# Patient Record
Sex: FEMALE | Race: WHITE | NOT HISPANIC OR LATINO | Employment: FULL TIME | URBAN - METROPOLITAN AREA
[De-identification: names, ages, dates, MRNs, and addresses within clinical notes are randomized per-mention and may not be internally consistent; named-entity substitution may affect disease eponyms.]

---

## 2019-02-20 ENCOUNTER — APPOINTMENT (OUTPATIENT)
Dept: WOMENS IMAGING | Facility: HOSPITAL | Age: 49
End: 2019-02-20

## 2019-02-20 PROCEDURE — 77067 SCR MAMMO BI INCL CAD: CPT | Performed by: RADIOLOGY

## 2019-02-20 PROCEDURE — 77063 BREAST TOMOSYNTHESIS BI: CPT | Performed by: RADIOLOGY

## 2019-11-08 VITALS
HEART RATE: 77 BPM | RESPIRATION RATE: 40 BRPM | HEART RATE: 91 BPM | SYSTOLIC BLOOD PRESSURE: 146 MMHG | RESPIRATION RATE: 27 BRPM | DIASTOLIC BLOOD PRESSURE: 84 MMHG | SYSTOLIC BLOOD PRESSURE: 132 MMHG | HEART RATE: 93 BPM | DIASTOLIC BLOOD PRESSURE: 91 MMHG | RESPIRATION RATE: 14 BRPM | DIASTOLIC BLOOD PRESSURE: 89 MMHG | TEMPERATURE: 99 F | DIASTOLIC BLOOD PRESSURE: 70 MMHG | OXYGEN SATURATION: 97 % | DIASTOLIC BLOOD PRESSURE: 95 MMHG | SYSTOLIC BLOOD PRESSURE: 119 MMHG | RESPIRATION RATE: 20 BRPM | SYSTOLIC BLOOD PRESSURE: 163 MMHG | HEART RATE: 84 BPM | HEART RATE: 89 BPM | SYSTOLIC BLOOD PRESSURE: 124 MMHG | DIASTOLIC BLOOD PRESSURE: 66 MMHG | HEART RATE: 87 BPM | DIASTOLIC BLOOD PRESSURE: 81 MMHG | SYSTOLIC BLOOD PRESSURE: 158 MMHG | OXYGEN SATURATION: 98 % | TEMPERATURE: 97.1 F | SYSTOLIC BLOOD PRESSURE: 151 MMHG | HEART RATE: 80 BPM | RESPIRATION RATE: 39 BRPM | OXYGEN SATURATION: 96 % | OXYGEN SATURATION: 99 % | WEIGHT: 189 LBS | HEIGHT: 64 IN | OXYGEN SATURATION: 100 % | RESPIRATION RATE: 18 BRPM | SYSTOLIC BLOOD PRESSURE: 115 MMHG

## 2019-11-11 ENCOUNTER — AMBULATORY SURGICAL CENTER (OUTPATIENT)
Dept: URBAN - METROPOLITAN AREA SURGERY 17 | Facility: SURGERY | Age: 49
End: 2019-11-11
Payer: COMMERCIAL

## 2019-11-11 DIAGNOSIS — K64.1 SECOND DEGREE HEMORRHOIDS: ICD-10-CM

## 2019-11-11 DIAGNOSIS — Z12.11 ENCOUNTER FOR SCREENING FOR MALIGNANT NEOPLASM OF COLON: ICD-10-CM

## 2019-11-11 DIAGNOSIS — Z80.0 FAMILY HISTORY OF MALIGNANT NEOPLASM OF DIGESTIVE ORGANS: ICD-10-CM

## 2019-11-11 PROCEDURE — 45378 DIAGNOSTIC COLONOSCOPY: CPT | Mod: 33 | Performed by: INTERNAL MEDICINE

## 2021-02-17 ENCOUNTER — APPOINTMENT (OUTPATIENT)
Dept: WOMENS IMAGING | Facility: HOSPITAL | Age: 51
End: 2021-02-17

## 2021-02-17 PROCEDURE — 77067 SCR MAMMO BI INCL CAD: CPT | Performed by: RADIOLOGY

## 2021-02-17 PROCEDURE — 77063 BREAST TOMOSYNTHESIS BI: CPT | Performed by: RADIOLOGY

## 2022-03-24 ENCOUNTER — APPOINTMENT (OUTPATIENT)
Dept: WOMENS IMAGING | Facility: HOSPITAL | Age: 52
End: 2022-03-24

## 2022-03-24 PROCEDURE — 77063 BREAST TOMOSYNTHESIS BI: CPT | Performed by: RADIOLOGY

## 2022-03-24 PROCEDURE — 77067 SCR MAMMO BI INCL CAD: CPT | Performed by: RADIOLOGY

## 2022-11-07 DIAGNOSIS — D50.9 IRON DEFICIENCY ANEMIA, UNSPECIFIED IRON DEFICIENCY ANEMIA TYPE: Primary | ICD-10-CM

## 2022-11-14 ENCOUNTER — CONSULT (OUTPATIENT)
Dept: ONCOLOGY | Facility: CLINIC | Age: 52
End: 2022-11-14

## 2022-11-14 ENCOUNTER — LAB (OUTPATIENT)
Dept: LAB | Facility: HOSPITAL | Age: 52
End: 2022-11-14

## 2022-11-14 VITALS
SYSTOLIC BLOOD PRESSURE: 156 MMHG | TEMPERATURE: 97.5 F | DIASTOLIC BLOOD PRESSURE: 98 MMHG | HEIGHT: 66 IN | HEART RATE: 68 BPM | WEIGHT: 191.6 LBS | OXYGEN SATURATION: 97 % | RESPIRATION RATE: 18 BRPM | BODY MASS INDEX: 30.79 KG/M2

## 2022-11-14 DIAGNOSIS — E06.3 HASHIMOTO'S THYROIDITIS: ICD-10-CM

## 2022-11-14 DIAGNOSIS — M35.03 SJOGREN'S SYNDROME WITH MYOPATHY: ICD-10-CM

## 2022-11-14 DIAGNOSIS — D64.9 ANEMIA, UNSPECIFIED TYPE: ICD-10-CM

## 2022-11-14 DIAGNOSIS — D72.825 BANDEMIA: ICD-10-CM

## 2022-11-14 DIAGNOSIS — M79.10 MUSCLE PAIN: ICD-10-CM

## 2022-11-14 DIAGNOSIS — D50.9 IRON DEFICIENCY ANEMIA, UNSPECIFIED IRON DEFICIENCY ANEMIA TYPE: ICD-10-CM

## 2022-11-14 DIAGNOSIS — D75.839 THROMBOCYTOSIS: ICD-10-CM

## 2022-11-14 DIAGNOSIS — E04.9 GOITER: ICD-10-CM

## 2022-11-14 DIAGNOSIS — E04.9 GOITER, NODULAR: ICD-10-CM

## 2022-11-14 DIAGNOSIS — D75.839 THROMBOCYTOSIS: Primary | ICD-10-CM

## 2022-11-14 LAB
ALBUMIN SERPL-MCNC: 4.5 G/DL (ref 3.5–5.2)
ALBUMIN/GLOB SERPL: 1 G/DL (ref 1.1–2.4)
ALP SERPL-CCNC: 83 U/L (ref 38–116)
ALT SERPL W P-5'-P-CCNC: 13 U/L (ref 0–33)
ANION GAP SERPL CALCULATED.3IONS-SCNC: 12.8 MMOL/L (ref 5–15)
AST SERPL-CCNC: 26 U/L (ref 0–32)
BASOPHILS # BLD AUTO: 0.07 10*3/MM3 (ref 0–0.2)
BASOPHILS NFR BLD AUTO: 0.7 % (ref 0–1.5)
BILIRUB SERPL-MCNC: 0.2 MG/DL (ref 0.2–1.2)
BUN SERPL-MCNC: 11 MG/DL (ref 6–20)
BUN/CREAT SERPL: 13.8 (ref 7.3–30)
CALCIUM SPEC-SCNC: 9.9 MG/DL (ref 8.5–10.2)
CHLORIDE SERPL-SCNC: 101 MMOL/L (ref 98–107)
CK SERPL-CCNC: 100 U/L (ref 20–180)
CO2 SERPL-SCNC: 23.2 MMOL/L (ref 22–29)
CREAT SERPL-MCNC: 0.8 MG/DL (ref 0.6–1.1)
DEPRECATED RDW RBC AUTO: 41.6 FL (ref 37–54)
EGFRCR SERPLBLD CKD-EPI 2021: 89.9 ML/MIN/1.73
EOSINOPHIL # BLD AUTO: 0.12 10*3/MM3 (ref 0–0.4)
EOSINOPHIL NFR BLD AUTO: 1.1 % (ref 0.3–6.2)
ERYTHROCYTE [DISTWIDTH] IN BLOOD BY AUTOMATED COUNT: 13.3 % (ref 12.3–15.4)
FERRITIN SERPL-MCNC: 53.8 NG/ML (ref 11–207)
FOLATE SERPL-MCNC: 12.7 NG/ML (ref 4.78–24.2)
GLOBULIN UR ELPH-MCNC: 4.3 GM/DL (ref 1.8–3.5)
GLUCOSE SERPL-MCNC: 93 MG/DL (ref 74–124)
HCT VFR BLD AUTO: 39 % (ref 34–46.6)
HGB BLD-MCNC: 13.2 G/DL (ref 12–15.9)
HGB RETIC QN AUTO: 33.5 PG (ref 29.8–36.1)
IMM GRANULOCYTES # BLD AUTO: 0.1 10*3/MM3 (ref 0–0.05)
IMM GRANULOCYTES NFR BLD AUTO: 0.9 % (ref 0–0.5)
IMM RETICS NFR: 12.6 % (ref 3–15.8)
IRON 24H UR-MRATE: 64 MCG/DL (ref 37–145)
IRON SATN MFR SERPL: 18 % (ref 14–48)
LDH SERPL-CCNC: 195 U/L (ref 99–259)
LYMPHOCYTES # BLD AUTO: 2.94 10*3/MM3 (ref 0.7–3.1)
LYMPHOCYTES NFR BLD AUTO: 27.3 % (ref 19.6–45.3)
MCH RBC QN AUTO: 28.9 PG (ref 26.6–33)
MCHC RBC AUTO-ENTMCNC: 33.8 G/DL (ref 31.5–35.7)
MCV RBC AUTO: 85.5 FL (ref 79–97)
MONOCYTES # BLD AUTO: 0.87 10*3/MM3 (ref 0.1–0.9)
MONOCYTES NFR BLD AUTO: 8.1 % (ref 5–12)
NEUTROPHILS NFR BLD AUTO: 6.65 10*3/MM3 (ref 1.7–7)
NEUTROPHILS NFR BLD AUTO: 61.9 % (ref 42.7–76)
NRBC BLD AUTO-RTO: 0 /100 WBC (ref 0–0.2)
PLATELET # BLD AUTO: 410 10*3/MM3 (ref 140–450)
PMV BLD AUTO: 9.5 FL (ref 6–12)
POTASSIUM SERPL-SCNC: 4 MMOL/L (ref 3.5–4.7)
PROT SERPL-MCNC: 8.8 G/DL (ref 6.3–8)
RBC # BLD AUTO: 4.56 10*6/MM3 (ref 3.77–5.28)
RETICS # AUTO: 0.07 10*6/MM3 (ref 0.02–0.13)
RETICS/RBC NFR AUTO: 1.51 % (ref 0.7–1.9)
SODIUM SERPL-SCNC: 137 MMOL/L (ref 134–145)
T4 FREE SERPL-MCNC: 1.05 NG/DL (ref 0.93–1.7)
TIBC SERPL-MCNC: 361 MCG/DL (ref 249–505)
TRANSFERRIN SERPL-MCNC: 258 MG/DL (ref 200–360)
TSH SERPL DL<=0.05 MIU/L-ACNC: 2.99 UIU/ML (ref 0.27–4.2)
VIT B12 BLD-MCNC: 634 PG/ML (ref 211–946)
WBC NRBC COR # BLD: 10.75 10*3/MM3 (ref 3.4–10.8)

## 2022-11-14 PROCEDURE — 83615 LACTATE (LD) (LDH) ENZYME: CPT | Performed by: INTERNAL MEDICINE

## 2022-11-14 PROCEDURE — 84466 ASSAY OF TRANSFERRIN: CPT | Performed by: INTERNAL MEDICINE

## 2022-11-14 PROCEDURE — 36415 COLL VENOUS BLD VENIPUNCTURE: CPT

## 2022-11-14 PROCEDURE — 83540 ASSAY OF IRON: CPT | Performed by: INTERNAL MEDICINE

## 2022-11-14 PROCEDURE — 82728 ASSAY OF FERRITIN: CPT | Performed by: INTERNAL MEDICINE

## 2022-11-14 PROCEDURE — 85046 RETICYTE/HGB CONCENTRATE: CPT | Performed by: INTERNAL MEDICINE

## 2022-11-14 PROCEDURE — 82607 VITAMIN B-12: CPT | Performed by: INTERNAL MEDICINE

## 2022-11-14 PROCEDURE — 82550 ASSAY OF CK (CPK): CPT | Performed by: INTERNAL MEDICINE

## 2022-11-14 PROCEDURE — 80050 GENERAL HEALTH PANEL: CPT

## 2022-11-14 PROCEDURE — 99204 OFFICE O/P NEW MOD 45 MIN: CPT | Performed by: INTERNAL MEDICINE

## 2022-11-14 PROCEDURE — 82746 ASSAY OF FOLIC ACID SERUM: CPT | Performed by: INTERNAL MEDICINE

## 2022-11-14 PROCEDURE — 84439 ASSAY OF FREE THYROXINE: CPT | Performed by: INTERNAL MEDICINE

## 2022-11-14 RX ORDER — DULOXETIN HYDROCHLORIDE 30 MG/1
CAPSULE, DELAYED RELEASE ORAL
COMMUNITY
Start: 2022-11-02 | End: 2022-12-06 | Stop reason: SINTOL

## 2022-11-14 RX ORDER — DIPHENOXYLATE HYDROCHLORIDE AND ATROPINE SULFATE 2.5; .025 MG/1; MG/1
TABLET ORAL DAILY
COMMUNITY
End: 2023-02-01

## 2022-11-14 RX ORDER — ALBUTEROL SULFATE 90 UG/1
AEROSOL, METERED RESPIRATORY (INHALATION)
COMMUNITY

## 2022-11-14 RX ORDER — AMLODIPINE BESYLATE 5 MG/1
TABLET ORAL
COMMUNITY
Start: 2022-10-10

## 2022-11-14 NOTE — PROGRESS NOTES
Subjective       DURING THE VISIT WITH THE PATIENT TODAY , PATIENT HAD FACE MASK, MY MEDICAL ASSISTANT AND I  HAD PROPPER PROTECTIVE EQUIPMENT, AND I DID HAND HYGIENE WITH SOAP AND WATER BEFORE AND AFTER THE VISIT.     REASON FOR CONSULTATION:  1. LEUKOCYTOSIS AND THROMBOCYTOSIS.  2. GOITER VERY LIKELY HASHIMOTO'S THYROIDITIS.  3. SJOGREN'S SYNDROME  Provide an opinion on any further workup or treatment                             REQUESTING PHYSICIAN:    Beau Frye MD      PCP - General, Internal Medicine     Since 11/14/2022     [phone]         RECORDS OBTAINED:  Records of the patients history including those obtained from the referring provider were reviewed and summarized in detail.    HISTORY OF PRESENT ILLNESS:  The patient is a 52 y.o. year old female who is here for an opinion about the above issue.  On 11/14/2022 I had the opportunity to see this 52-year-old  female who comes to the office in consultation after Dr. Slick Be, rheumatologist, referred her in order to review thrombocytosis and minimal leukocytosis. The patient is known for having these numbers abnormal now for several weeks. Also she has previous history of anemia at the time that she had a cerebral aneurysm and clip 2 years ago. Anemia has resolved. The patient complains now days of significant muscle pain and neuropathic symptomatology in her upper and lower extremities with numbness and tingling. She also complains of fatigue for any activity. She has not had any fevers, chills, night sweats, pruritus or adenopathies. Her appetite and weight have remained stable. Her bowel activity and urination are normal. She has not had any vaginal bleeding even though she has had in the past a hysterectomy; she never had oophorectomy. She still has the feeling of breast sensitivity at the time of possible menstrual cycle. The patient denies any rashes in the skin, alopecia or sores in her mouth. She has not had any  infections. She has not had any obvious joint pain but she has very significant fatigue to the point that she gets exhausted by the end of her work shift. She works for the [employer] service. She gets home and she just lies in bed.     She has been found by Dr. Be to have Sjogren syndrome. Also she has had antithyroglobulin antibodies. The patient also has been found to have a goiter today on her clinical examination. She was never aware of that. She attributes the neuropathic symptomatology in her legs and upper extremities to a cerebral aneurysm that she had 2 years ago. She has had migraines and headaches for a long time. This triggered MRI assessment that diagnosed her cerebral aneurysm. This never ruptured.        Past Medical History:   Diagnosis Date   • Achilles tendonitis    • Anxiety    • Arthritis     ankles, shoulders   • Asthma     childhood   • Benign essential hypertension    • Cerebral aneurysm    • GERD (gastroesophageal reflux disease)    • Headache    • History of anemia    • History of E. coli septicemia    • Rotator cuff syndrome    • Seasonal allergies         Past Surgical History:   Procedure Laterality Date   • ABSCESS DRAINAGE  2000    Renal abscess   • CEREBRAL ANGIOGRAM  2020   • CRANIOTOMY  2020    with angiogram for aneurysm   • HYSTERECTOMY  2017    partial, both ovaries remain   • ROTATOR CUFF REPAIR Right 2012   • SHOULDER SURGERY          Current Outpatient Medications on File Prior to Visit   Medication Sig Dispense Refill   • albuterol sulfate HFA (Ventolin HFA) 108 (90 Base) MCG/ACT inhaler Ventolin HFA 90 mcg/actuation aerosol inhaler     • amLODIPine (NORVASC) 5 MG tablet      • diclofenac (VOLTAREN) 75 MG EC tablet   0   • DULoxetine (CYMBALTA) 30 MG capsule      • multivitamin (THERAGRAN) tablet tablet Take  by mouth Daily.     • nebivolol (BYSTOLIC) 10 MG tablet Take 10 mg by mouth daily.       No current facility-administered medications on file prior to visit.         ALLERGIES:    Allergies   Allergen Reactions   • Coconut Shortness Of Breath   • Eggs Or Egg-Derived Products Diarrhea        Social History     Socioeconomic History   • Marital status:    • Number of children: 3   Tobacco Use   • Smoking status: Never   • Smokeless tobacco: Never   Substance and Sexual Activity   • Alcohol use: Yes        Family History   Problem Relation Age of Onset   • Hypertension Father    • Multiple sclerosis Brother    • Diabetes Brother    • Colon cancer Paternal Aunt    • Heart attack Maternal Grandmother    • Heart disease Maternal Grandmother    • Pancreatic cancer Maternal Grandfather    • Colon cancer Paternal Grandmother    • Heart attack Paternal Grandfather    • Cancer Paternal Grandfather         Review of Systems   General: no fever, no chills, STATED fatigue,no weight changes, no lack of appetite.  Eyes: no epiphora, xerophthalmia,conjunctivitis, pain, glaucoma, blurred vision, blindness, secretion, photophobia, proptosis, diplopia.  Ears: no otorrhea, tinnitus, otorrhagia, deafness, pain, vertigo.  Nose: no rhinorrhea, no epistaxis, no alteration in perception of odors, no sinuses pressure.  Mouth: no alteration in gums or teeth,  No ulcers, no difficulty with mastication or deglution, no odynophagia.  Neck: no masses or pain, no thyroid alterations, no pain in muscles or arteries, no carotid odynia, no crepitation.  Respiratory: no cough, no sputum production,no dyspnea,no trepopnea, no pleuritic pain,no hemoptysis.  Heart: no syncope, no irregularity, no palpitations, no angina,no orthopnea,no paroxysmal nocturnal dyspnea.  Vascular Venous: no tenderness,no edema,no palpable cords,no postphlebitic syndrome, no skin changes no ulcerations.  Vascular Arterial: no distal ischemia, no claudication, no gangrene, no neuropathic ischemic pain, no skin ulcers, no paleness no cyanosis.  GI: no dysphagia, no odynophagia, no regurgitation, no heartburn,no indigestion,no  "nausea,no vomiting,no hematemesis ,no melena,no jaundice,no distention, no obstipation,no enterorrhagia,no proctalgia,no anal  lesions, no changes in bowel habits.  : no frequency, no hesitancy, no hematuria, no discharge,no  pain.  Musculoskeletal: STATED muscle or tendon pain or inflammation,no  joint pain, no edema, SIGNIFICANT functional limitation,no fasciculations, no mass.  Neurologic: CHRONIC headache, no seizures, no alterations on craneal nerves, no motor deficit, STAATED sensory deficit, normal coordination, no alteration in memory,normal orientation, calculation,normal writing, verbal and written language.  Skin: no rashes,no pruritus no localized lesions.  Psychiatric: no anxiety, no depression,no agitation, no delusions, proper insight.      Objective     Vitals:    11/14/22 1151   BP: 156/98   Pulse: 68   Resp: 18   Temp: 97.5 °F (36.4 °C)   TempSrc: Temporal   SpO2: 97%   Weight: 86.9 kg (191 lb 9.6 oz)   Height: 166.4 cm (65.5\")   PainSc: 0-No pain     Current Status 11/14/2022   ECOG score 0       Physical Exam        GENERAL:  Well-developed, well-nourished  Patient  in no acute distress.   SKIN:  Warm, dry ,NO purpura ,no rash.  HEENT:  Pupils were equal and reactive to light and accomodation, conjunctivae noninjected, normal extraocular movements, normal visual acuity.   NECK:  Supple with good range of motion; NODULAR NONE TENDER thyromegaly , no JVD or bruits,.No carotid artery pain, no carotid abnormal pulsation   LYMPHATICS:  No cervical, NO supraclavicular, NO axillary, NO inguinal adenopathies.  CARDIAC   normal rate , regular rhythm, without murmur,NO rubs NO S3 NO S4   LUNGS: normal breath sounds bilateral, no wheezing, NO rhonchi, NO crackles ,NO rubs.  VASCULAR VENOUS: no cyanosis, NO collateral circulation, NO varicosities, NO edema, NO palpable cords, NO pain,NO erythema, NO pigmentation of the skin.  ABDOMEN:  Soft, NO pain,no hepatomegaly, no splenomegaly,no masses, no ascites, " no collateral circulation,no distention.  EXTREMITIES  AND SPINE:  No clubbing, no cyanosis ,no deformities , MUSCLE  Pain AT MULTIPLE LEVELS .No kyphosis,  no pain in spine, no pain in ribs , no pain in pelvic bone.  NEUROLOGICAL:  Patient was awake, alert, oriented to time, person and place.NUMBNESS IN FEET AND HANDS        RECENT LABS:  Hematology WBC   Date Value Ref Range Status   11/14/2022 10.75 3.40 - 10.80 10*3/mm3 Final   12/14/2021 12.38 (H) 4.5 - 11.0 10*3/uL Final     RBC   Date Value Ref Range Status   11/14/2022 4.56 3.77 - 5.28 10*6/mm3 Final   12/14/2021 4.24 4.0 - 5.2 10*6/uL Final     Hemoglobin   Date Value Ref Range Status   11/14/2022 13.2 12.0 - 15.9 g/dL Final   12/14/2021 12.2 12.0 - 16.0 g/dL Final     Hematocrit   Date Value Ref Range Status   11/14/2022 39.0 34.0 - 46.6 % Final   12/14/2021 37.3 36.0 - 46.0 % Final     Platelets   Date Value Ref Range Status   11/14/2022 410 140 - 450 10*3/mm3 Final   12/14/2021 495 (H) 140 - 440 10*3/uL Final       CBC:    WBC   Date Value Ref Range Status   11/14/2022 10.75 3.40 - 10.80 10*3/mm3 Final   12/14/2021 12.38 (H) 4.5 - 11.0 10*3/uL Final     RBC   Date Value Ref Range Status   11/14/2022 4.56 3.77 - 5.28 10*6/mm3 Final   12/14/2021 4.24 4.0 - 5.2 10*6/uL Final     Hemoglobin   Date Value Ref Range Status   11/14/2022 13.2 12.0 - 15.9 g/dL Final   12/14/2021 12.2 12.0 - 16.0 g/dL Final     Hematocrit   Date Value Ref Range Status   11/14/2022 39.0 34.0 - 46.6 % Final   12/14/2021 37.3 36.0 - 46.0 % Final     MCV   Date Value Ref Range Status   11/14/2022 85.5 79.0 - 97.0 fL Final   12/14/2021 88.0 80.0 - 100.0 fL Final     MCH   Date Value Ref Range Status   11/14/2022 28.9 26.6 - 33.0 pg Final   12/14/2021 28.8 26.0 - 34.0 pg Final     MCHC   Date Value Ref Range Status   11/14/2022 33.8 31.5 - 35.7 g/dL Final   12/14/2021 32.7 31.0 - 37.0 g/dL Final     RDW   Date Value Ref Range Status   11/14/2022 13.3 12.3 - 15.4 % Final   12/14/2021 13.9  12.0 - 16.8 % Final     RDW-SD   Date Value Ref Range Status   11/14/2022 41.6 37.0 - 54.0 fl Final     MPV   Date Value Ref Range Status   11/14/2022 9.5 6.0 - 12.0 fL Final   12/14/2021 9.5 8.4 - 12.4 fL Final     Platelets   Date Value Ref Range Status   11/14/2022 410 140 - 450 10*3/mm3 Final   12/14/2021 495 (H) 140 - 440 10*3/uL Final     Neutrophil Rel %   Date Value Ref Range Status   12/14/2021 63.8 45 - 80 % Final     Neutrophil %   Date Value Ref Range Status   11/14/2022 61.9 42.7 - 76.0 % Final     Lymphocyte Rel %   Date Value Ref Range Status   12/14/2021 24.4 15 - 50 % Final     Lymphocyte %   Date Value Ref Range Status   11/14/2022 27.3 19.6 - 45.3 % Final     Monocyte Rel %   Date Value Ref Range Status   12/14/2021 9.0 0 - 15 % Final     Monocyte %   Date Value Ref Range Status   11/14/2022 8.1 5.0 - 12.0 % Final     Eosinophil %   Date Value Ref Range Status   11/14/2022 1.1 0.3 - 6.2 % Final   12/14/2021 1.4 0 - 7 % Final     Basophil Rel %   Date Value Ref Range Status   12/14/2021 0.6 0 - 2 % Final     Basophil %   Date Value Ref Range Status   11/14/2022 0.7 0.0 - 1.5 % Final     Immature Grans %   Date Value Ref Range Status   11/14/2022 0.9 (H) 0.0 - 0.5 % Final   12/14/2021 0.8 0.0 - 1.0 % Final     Neutrophils Absolute   Date Value Ref Range Status   12/14/2021 7.89 2.0 - 8.8 10*3/uL Final     Neutrophils, Absolute   Date Value Ref Range Status   11/14/2022 6.65 1.70 - 7.00 10*3/mm3 Final     Lymphocytes Absolute   Date Value Ref Range Status   12/14/2021 3.02 0.7 - 5.5 10*3/uL Final     Lymphocytes, Absolute   Date Value Ref Range Status   11/14/2022 2.94 0.70 - 3.10 10*3/mm3 Final     Monocytes Absolute   Date Value Ref Range Status   12/14/2021 1.12 0.0 - 1.7 10*3/uL Final     Monocytes, Absolute   Date Value Ref Range Status   11/14/2022 0.87 0.10 - 0.90 10*3/mm3 Final     Eosinophils Absolute   Date Value Ref Range Status   12/14/2021 0.17 0.0 - 0.8 10*3/uL Final     Eosinophils,  Absolute   Date Value Ref Range Status   11/14/2022 0.12 0.00 - 0.40 10*3/mm3 Final     Basophils Absolute   Date Value Ref Range Status   12/14/2021 0.08 0.0 - 0.2 10*3/uL Final     Basophils, Absolute   Date Value Ref Range Status   11/14/2022 0.07 0.00 - 0.20 10*3/mm3 Final     Immature Grans, Absolute   Date Value Ref Range Status   11/14/2022 0.10 (H) 0.00 - 0.05 10*3/mm3 Final   12/14/2021 0.10 0.00 - 0.10 10*3/uL Final     nRBC   Date Value Ref Range Status   11/14/2022 0.0 0.0 - 0.2 /100 WBC Final        CMP:    Glucose   Date Value Ref Range Status   11/14/2022 93 74 - 124 mg/dL Final     BUN   Date Value Ref Range Status   11/14/2022 11 6 - 20 mg/dL Final   08/07/2020 5 (L) 7 - 20 mg/dL Final     Creatinine   Date Value Ref Range Status   11/14/2022 0.80 0.60 - 1.10 mg/dL Final   08/07/2020 0.6 (L) 0.7 - 1.5 mg/dL Final     Sodium   Date Value Ref Range Status   11/14/2022 137 134 - 145 mmol/L Final   08/07/2020 137 137 - 145 mmol/L Final     Potassium   Date Value Ref Range Status   11/14/2022 4.0 3.5 - 4.7 mmol/L Final   08/07/2020 3.5 3.5 - 5.1 mmol/L Final     Chloride   Date Value Ref Range Status   11/14/2022 101 98 - 107 mmol/L Final   08/07/2020 106 98 - 107 mmol/L Final     CO2   Date Value Ref Range Status   11/14/2022 23.2 22.0 - 29.0 mmol/L Final     Total CO2   Date Value Ref Range Status   08/07/2020 21 (L) 22 - 30 mmol/L Final     Calcium   Date Value Ref Range Status   11/14/2022 9.9 8.5 - 10.2 mg/dL Final   08/07/2020 8.4 8.4 - 10.2 mg/dL Final     Total Protein   Date Value Ref Range Status   11/14/2022 8.8 (H) 6.3 - 8.0 g/dL Final   08/05/2020 7.5 6.3 - 8.2 g/dL Final     Albumin   Date Value Ref Range Status   11/14/2022 4.50 3.50 - 5.20 g/dL Final   08/05/2020 4.1 3.5 - 5.0 g/dL Final     ALT (SGPT)   Date Value Ref Range Status   11/14/2022 13 0 - 33 U/L Final   08/05/2020 25 0 - 35 U/L Final     Comment:     If ALT testing ordered prior to 2359 on 11/16/19, please use reference  range: Male 0-50, Female 0-35.  [facility] switched to a new ALT assay on 11/16/19 which yields results 10 U/L lower than the old assay.     AST (SGOT)   Date Value Ref Range Status   11/14/2022 26 0 - 32 U/L Final   08/05/2020 39 15 - 46 U/L Final     Alkaline Phosphatase   Date Value Ref Range Status   11/14/2022 83 38 - 116 U/L Final   08/05/2020 65 38 - 126 U/L Final     Total Bilirubin   Date Value Ref Range Status   11/14/2022 0.2 0.2 - 1.2 mg/dL Final   08/05/2020 0.3 0.2 - 1.3 mg/dL Final     Globulin   Date Value Ref Range Status   11/14/2022 4.3 (H) 1.8 - 3.5 gm/dL Final   08/05/2020 3.4 1.5 - 4.5 g/dL Final     A/G Ratio   Date Value Ref Range Status   11/14/2022 1.0 (L) 1.1 - 2.4 g/dL Final     BUN/Creatinine Ratio   Date Value Ref Range Status   11/14/2022 13.8 7.3 - 30.0 Final   08/07/2020 8.6 RATIO Final     Anion Gap   Date Value Ref Range Status   11/14/2022 12.8 5.0 - 15.0 mmol/L Final         Recent imaging:    No results found.       Assessment & Plan     Diagnoses and all orders for this visit:    1. Thrombocytosis (Primary)  -     Comprehensive Metabolic Panel  -     TSH  -     T4, Free  -     Ferritin  -     Iron Profile  -     Retic With IRF & RET-He  -     Lactate Dehydrogenase  -     Folate  -     Vitamin B12  -     Hemoglobinopathy Fractionation Cascade  -     CK; Future  -     Aldolase; Future  -     CBC & Differential; Future    2. Anemia, unspecified type  -     Comprehensive Metabolic Panel  -     TSH  -     T4, Free  -     Ferritin  -     Iron Profile  -     Retic With IRF & RET-He  -     Lactate Dehydrogenase  -     Folate  -     Vitamin B12  -     Hemoglobinopathy Fractionation Cascade  -     CK; Future  -     Aldolase; Future  -     CBC & Differential; Future    3. Goiter  -     Comprehensive Metabolic Panel  -     TSH  -     T4, Free  -     Ferritin  -     Iron Profile  -     Retic With IRF & RET-He  -     Lactate Dehydrogenase  -     Folate  -     Vitamin B12  -      Hemoglobinopathy Fractionation Cascade  -     CK; Future  -     Aldolase; Future  -     CBC & Differential; Future    4. Hashimoto's thyroiditis  -     Comprehensive Metabolic Panel  -     TSH  -     T4, Free  -     Ferritin  -     Iron Profile  -     Retic With IRF & RET-He  -     Lactate Dehydrogenase  -     Folate  -     Vitamin B12  -     Hemoglobinopathy Fractionation Cascade  -     CK; Future  -     Aldolase; Future  -     CBC & Differential; Future    5. Muscle pain  -     Comprehensive Metabolic Panel  -     TSH  -     T4, Free  -     Ferritin  -     Iron Profile  -     Retic With IRF & RET-He  -     Lactate Dehydrogenase  -     Folate  -     Vitamin B12  -     Hemoglobinopathy Fractionation Cascade  -     CK; Future  -     Aldolase; Future  -     CBC & Differential; Future    6. Bandemia    7. Goiter, nodular    8. Sjogren's syndrome with myopathy (HCC)         On 11/14/2022 I have reviewed the peripheral blood on this patient and the below is the report.     Red blood cell morphology is normocytic normochromic with no inclusions, no fragments, no spherocytes. There are no fragmented red cells in circulation. There are no target cells and there are no spherocytes. There are no sickle cells in circulation.     White blood cell morphology is normal with normal granularity, minimal increase in the total white count. No blasts of plasma cells in circulation. No alteration in the segmentation of the polys.     Platelet count minimally increased with normal platelet morphology. No giant platelets.     In summary on 11/14/2022 this  female who states that she has been anemic for most of her life was anemic in 2019 when she had a clip cerebral aneurysm done but now she is no longer anemic. She has had in the past hysterectomy for excessive vaginal bleeding. Obviously she does not have menstrual flow anymore. The leukocytosis is difficult to put together in absence of any infection. She is not  taking any prednisone or any medication that will favor this to happen and the thrombocytosis also seems to be that is a reactive phenomenon to some inflammatory condition. She is well known to have Sjogren syndrome and Dr. Be already has diagnosed her through multiple serological analysis. Also the patient has been found to have very likely Hashimoto thyroiditis. The patient also has significant muscle pain presumed to be fibromyalgia and I think it is worth it to repeat CPK and aldolase to be sure that there is no component of polymyositis. Also the patient has neuropathic symptomatology that will require the measurement of a B12 level and a serum protein immunoelectrophoresis.     Given the fact that the patient according to her has been anemic all her life and she has had episodes when she takes iron and the numbers improve and other times when she does not and I think will require a hemoglobin electrophoresis. She does not have any family member that caught my attention in regard to hemoglobinopathy. Nevertheless, I think this is a necessity. She has 3 grown children and she has more family members, none of them have had anemia even though she admits that she has a sister who has had anemia.     They went ahead and pursue laboratory testing as above and then she will return to see me back in a couple of weeks. I did not prescribe any medications or change any other medicines that she is already taking.     I went ahead and also ordered a TSH and a T3, also a T4 will be done.

## 2022-11-15 LAB
ALDOLASE SERPL-CCNC: 5 U/L (ref 3.3–10.3)
HGB A MFR BLD ELPH: 97.4 % (ref 96.4–98.8)
HGB A2 MFR BLD ELPH: 2.6 % (ref 1.8–3.2)
HGB F MFR BLD ELPH: 0 % (ref 0–2)
HGB FRACT BLD-IMP: NORMAL
HGB S MFR BLD ELPH: 0 %

## 2022-11-30 ENCOUNTER — TELEPHONE (OUTPATIENT)
Dept: ONCOLOGY | Facility: CLINIC | Age: 52
End: 2022-11-30

## 2022-11-30 DIAGNOSIS — M35.03 SJOGREN'S SYNDROME WITH MYOPATHY: Primary | ICD-10-CM

## 2022-11-30 DIAGNOSIS — D72.829 LEUKOCYTOSIS, UNSPECIFIED TYPE: ICD-10-CM

## 2022-11-30 DIAGNOSIS — D75.839 THROMBOCYTOSIS: ICD-10-CM

## 2022-11-30 NOTE — TELEPHONE ENCOUNTER
----- Message from Estuardo Freedman MD sent at 11/30/2022  8:39 AM EST -----  Find report of serum protein electrophoresis from the rheumatology office dr drew, bring pt back before junior in dec 6 for spep if, and urine collection 24 hs for upep if, and ua

## 2022-12-02 ENCOUNTER — LAB (OUTPATIENT)
Dept: LAB | Facility: HOSPITAL | Age: 52
End: 2022-12-02

## 2022-12-02 DIAGNOSIS — M35.03 SJOGREN'S SYNDROME WITH MYOPATHY: ICD-10-CM

## 2022-12-02 DIAGNOSIS — D64.9 ANEMIA, UNSPECIFIED TYPE: ICD-10-CM

## 2022-12-02 DIAGNOSIS — M79.10 MUSCLE PAIN: ICD-10-CM

## 2022-12-02 DIAGNOSIS — D75.839 THROMBOCYTOSIS: ICD-10-CM

## 2022-12-02 DIAGNOSIS — E04.9 GOITER: ICD-10-CM

## 2022-12-02 DIAGNOSIS — E06.3 HASHIMOTO'S THYROIDITIS: ICD-10-CM

## 2022-12-02 DIAGNOSIS — D72.829 LEUKOCYTOSIS, UNSPECIFIED TYPE: ICD-10-CM

## 2022-12-02 LAB
BACTERIA UR QL AUTO: ABNORMAL /HPF
BASOPHILS # BLD AUTO: 0.07 10*3/MM3 (ref 0–0.2)
BASOPHILS NFR BLD AUTO: 0.8 % (ref 0–1.5)
BILIRUB UR QL STRIP: NEGATIVE
CLARITY UR: CLEAR
COLOR UR: YELLOW
DEPRECATED RDW RBC AUTO: 44.2 FL (ref 37–54)
EOSINOPHIL # BLD AUTO: 0.14 10*3/MM3 (ref 0–0.4)
EOSINOPHIL NFR BLD AUTO: 1.5 % (ref 0.3–6.2)
ERYTHROCYTE [DISTWIDTH] IN BLOOD BY AUTOMATED COUNT: 13.7 % (ref 12.3–15.4)
GLUCOSE UR STRIP-MCNC: NEGATIVE MG/DL
HCT VFR BLD AUTO: 39.9 % (ref 34–46.6)
HGB BLD-MCNC: 13 G/DL (ref 12–15.9)
HGB UR QL STRIP.AUTO: ABNORMAL
HYALINE CASTS UR QL AUTO: ABNORMAL /LPF
IMM GRANULOCYTES # BLD AUTO: 0.05 10*3/MM3 (ref 0–0.05)
IMM GRANULOCYTES NFR BLD AUTO: 0.5 % (ref 0–0.5)
KETONES UR QL STRIP: ABNORMAL
LEUKOCYTE ESTERASE UR QL STRIP.AUTO: NEGATIVE
LYMPHOCYTES # BLD AUTO: 2.87 10*3/MM3 (ref 0.7–3.1)
LYMPHOCYTES NFR BLD AUTO: 31.4 % (ref 19.6–45.3)
MCH RBC QN AUTO: 28.6 PG (ref 26.6–33)
MCHC RBC AUTO-ENTMCNC: 32.6 G/DL (ref 31.5–35.7)
MCV RBC AUTO: 87.7 FL (ref 79–97)
MONOCYTES # BLD AUTO: 0.68 10*3/MM3 (ref 0.1–0.9)
MONOCYTES NFR BLD AUTO: 7.4 % (ref 5–12)
NEUTROPHILS NFR BLD AUTO: 5.32 10*3/MM3 (ref 1.7–7)
NEUTROPHILS NFR BLD AUTO: 58.4 % (ref 42.7–76)
NITRITE UR QL STRIP: NEGATIVE
NRBC BLD AUTO-RTO: 0 /100 WBC (ref 0–0.2)
PH UR STRIP.AUTO: 6 [PH] (ref 4.5–8)
PLATELET # BLD AUTO: 525 10*3/MM3 (ref 140–450)
PMV BLD AUTO: 9.3 FL (ref 6–12)
PROT UR QL STRIP: ABNORMAL
RBC # BLD AUTO: 4.55 10*6/MM3 (ref 3.77–5.28)
RBC # UR STRIP: ABNORMAL /HPF
REF LAB TEST METHOD: ABNORMAL
SP GR UR STRIP: >=1.03 (ref 1–1.03)
SQUAMOUS #/AREA URNS HPF: ABNORMAL /HPF
UROBILINOGEN UR QL STRIP: ABNORMAL
WBC # UR STRIP: ABNORMAL /HPF
WBC NRBC COR # BLD: 9.13 10*3/MM3 (ref 3.4–10.8)

## 2022-12-02 PROCEDURE — 36415 COLL VENOUS BLD VENIPUNCTURE: CPT

## 2022-12-02 PROCEDURE — 85025 COMPLETE CBC W/AUTO DIFF WBC: CPT

## 2022-12-02 PROCEDURE — 81001 URINALYSIS AUTO W/SCOPE: CPT

## 2022-12-06 ENCOUNTER — LAB (OUTPATIENT)
Dept: LAB | Facility: HOSPITAL | Age: 52
End: 2022-12-06

## 2022-12-06 ENCOUNTER — OFFICE VISIT (OUTPATIENT)
Dept: ONCOLOGY | Facility: CLINIC | Age: 52
End: 2022-12-06

## 2022-12-06 VITALS
TEMPERATURE: 97.1 F | HEART RATE: 61 BPM | SYSTOLIC BLOOD PRESSURE: 149 MMHG | HEIGHT: 66 IN | DIASTOLIC BLOOD PRESSURE: 91 MMHG | BODY MASS INDEX: 30.31 KG/M2 | OXYGEN SATURATION: 98 % | WEIGHT: 188.6 LBS | RESPIRATION RATE: 16 BRPM

## 2022-12-06 DIAGNOSIS — M79.10 MUSCLE PAIN: ICD-10-CM

## 2022-12-06 DIAGNOSIS — D47.2 MGUS (MONOCLONAL GAMMOPATHY OF UNKNOWN SIGNIFICANCE): Primary | ICD-10-CM

## 2022-12-06 DIAGNOSIS — D72.825 BANDEMIA: ICD-10-CM

## 2022-12-06 DIAGNOSIS — E04.9 GOITER, NODULAR: ICD-10-CM

## 2022-12-06 DIAGNOSIS — D75.839 THROMBOCYTOSIS: ICD-10-CM

## 2022-12-06 LAB
ALBUMIN SERPL ELPH-MCNC: 3.6 G/DL (ref 2.9–4.4)
ALBUMIN/GLOB SERPL: 0.9 {RATIO} (ref 0.7–1.7)
ALPHA1 GLOB SERPL ELPH-MCNC: 0.3 G/DL (ref 0–0.4)
ALPHA2 GLOB SERPL ELPH-MCNC: 1 G/DL (ref 0.4–1)
B-GLOBULIN SERPL ELPH-MCNC: 1 G/DL (ref 0.7–1.3)
GAMMA GLOB SERPL ELPH-MCNC: 2.1 G/DL (ref 0.4–1.8)
GLOBULIN SER-MCNC: 4.4 G/DL (ref 2.2–3.9)
IGA SERPL-MCNC: 172 MG/DL (ref 87–352)
IGG SERPL-MCNC: 2152 MG/DL (ref 586–1602)
IGM SERPL-MCNC: 70 MG/DL (ref 26–217)
INTERPRETATION SERPL IEP-IMP: ABNORMAL
KAPPA LC FREE SER-MCNC: 52.9 MG/L (ref 3.3–19.4)
KAPPA LC FREE/LAMBDA FREE SER: 3.33 {RATIO} (ref 0.26–1.65)
LABORATORY COMMENT REPORT: ABNORMAL
LAMBDA LC FREE SERPL-MCNC: 15.9 MG/L (ref 5.7–26.3)
M PROTEIN SERPL ELPH-MCNC: 1.3 G/DL
PROT SERPL-MCNC: 8 G/DL (ref 6–8.5)

## 2022-12-06 PROCEDURE — 99214 OFFICE O/P EST MOD 30 MIN: CPT | Performed by: INTERNAL MEDICINE

## 2022-12-06 RX ORDER — ASCORBIC ACID 500 MG
TABLET ORAL
COMMUNITY
End: 2023-02-01

## 2022-12-06 RX ORDER — BACLOFEN 20 MG
TABLET ORAL
COMMUNITY
End: 2023-03-17

## 2022-12-06 RX ORDER — GALCANEZUMAB 120 MG/ML
INJECTION, SOLUTION SUBCUTANEOUS
COMMUNITY
Start: 2022-11-11

## 2022-12-06 RX ORDER — METOPROLOL TARTRATE 50 MG/1
50 TABLET, FILM COATED ORAL EVERY MORNING
COMMUNITY
Start: 2022-11-11

## 2022-12-06 NOTE — PROGRESS NOTES
Subjective         DURING THE VISIT WITH THE PATIENT TODAY , PATIENT HAD FACE MASK, MY MEDICAL ASSISTANT AND I  HAD PROPPER PROTECTIVE EQUIPMENT, AND I DID HAND HYGIENE WITH SOAP AND WATER BEFORE AND AFTER THE VISIT.     REASONS FOR FOLLOWUP:  1. 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 NO ANEMIA, NORMAL CREATININE,NORMAL CALCIUM, NO BONE PAIN NO ADENOPATHIES , NORMAL DIFFERENTIAL IN WBC.  2. LEUKOCYTOSIS AND THROMBOCYTOSIS.  3. GOITER VERY LIKELY HASHIMOTO'S THYROIDITIS.  4. SJOGREN'S SYNDROME.      HISTORY OF PRESENT ILLNESS:   The patient is known for having these numbers abnormal now for several weeks. Also she has previous history of anemia at the time that she had a cerebral aneurysm and clip 2 years ago. Anemia has resolved. The patient complains now days of significant muscle pain and neuropathic symptomatology in her upper and lower extremities with numbness and tingling. She also complains of fatigue for any activity. She has not had any fevers, chills, night sweats, pruritus or adenopathies. Her appetite and weight have remained stable. Her bowel activity and urination are normal. She has not had any vaginal bleeding even though she has had in the past a hysterectomy; she never had oophorectomy. She still has the feeling of breast sensitivity at the time of possible menstrual cycle. The patient denies any rashes in the skin, alopecia or sores in her mouth. She has not had any infections. She has not had any obvious joint pain but she has very significant fatigue to the point that she gets exhausted by the end of her work shift. She works for the [employer]. She gets home and she just lies in bed.      She has been found by Dr. GRUBER to have Sjogren syndrome. Also she has had antithyroglobulin antibodies. The patient also has been found to have a goiter today on her clinical examination. She was never aware of that. She attributes the neuropathic symptomatology in her  legs and upper extremities to a cerebral aneurysm that she had 2 years ago. She has had migraines and headaches for a long time. This triggered MRI assessment that diagnosed her cerebral aneurysm. This never ruptured.   The patient was brought back to the office on 12/06/2022 in order to be reviewed. In the meantime after we had a conversation with Darrin Ayala MD, the Rheumatologist he made me aware that the patient had also serum protein electrophoresis that disclosed a monoclonal protein. We have been trying to obtain the report of that test from the office being impossible. In any event we called the patient back a few days ago, last Friday to pursue a serum protein immunoelectrophoresis and she has collected a urine of 24 hours for urine protein immunoelectrophoresis. The report of this sample is pending today. In the meantime a urinalysis was performed that showed a specific density of 1030, minimal ketones and very small amount of protein in the urine. The rest of the laboratory parameters obtained during the original visit are back and will be discussed below.     The patient continues having nausea associated with Cymbalta use to the point that she is not eating or drinking enough. This medicine has not triggered any benefit in regard to the control of the muscle pain and discomfort that she is experiencing. Besides this her bowel activity is normal. She has not had any new cardiovascular or respiratory issues. She has not had any fever, infection or new sites of bone pain. We have also a multitude of laboratory parameters obtained during the original visit for review today.     ·       Past Medical History:   Diagnosis Date   • Achilles tendonitis    • Anemia    • Anxiety    • Arthritis     ankles, shoulders   • Asthma     childhood   • Benign essential hypertension    • Cerebral aneurysm    • GERD (gastroesophageal reflux disease)    • Headache    • History of anemia    • History of E. coli septicemia    •  Rotator cuff syndrome    • Seasonal allergies         Past Surgical History:   Procedure Laterality Date   • ABSCESS DRAINAGE  2000    Renal abscess   • CEREBRAL ANGIOGRAM  2020   • CRANIOTOMY  2020    with angiogram for aneurysm   • HYSTERECTOMY  2017    partial, both ovaries remain   • ROTATOR CUFF REPAIR Right 2012   • SHOULDER SURGERY          Current Outpatient Medications on File Prior to Visit   Medication Sig Dispense Refill   • albuterol sulfate  (90 Base) MCG/ACT inhaler Ventolin HFA 90 mcg/actuation aerosol inhaler     • amLODIPine (NORVASC) 5 MG tablet      • ascorbic acid (VITAMIN C) 500 MG tablet      • Emgality 120 MG/ML auto-injector pen INJECT 1 ML SUBCUTANEOUSLY ONCE EVERY MONTH     • Magnesium Oxide 500 MG tablet magnesium oxide 500 mg tablet   TAKE 1 TABLET BY MOUTH NIGHTLY     • metoprolol tartrate (LOPRESSOR) 50 MG tablet      • multivitamin (THERAGRAN) tablet tablet Take  by mouth Daily.     • Rimegepant Sulfate (NURTEC) 75 MG tablet dispersible tablet Take 1 tablet by mouth Daily As Needed.     • [DISCONTINUED] DULoxetine (CYMBALTA) 30 MG capsule      • [DISCONTINUED] diclofenac (VOLTAREN) 75 MG EC tablet   0   • [DISCONTINUED] nebivolol (BYSTOLIC) 10 MG tablet Take 10 mg by mouth daily.       No current facility-administered medications on file prior to visit.        ALLERGIES:    Allergies   Allergen Reactions   • Coconut Shortness Of Breath   • Eggs Or Egg-Derived Products Diarrhea        Social History     Socioeconomic History   • Marital status:    • Number of children: 3   Tobacco Use   • Smoking status: Never   • Smokeless tobacco: Never   Substance and Sexual Activity   • Alcohol use: Yes   • Drug use: Never        Family History   Problem Relation Age of Onset   • Hypertension Father    • Multiple sclerosis Brother    • Diabetes Brother    • Colon cancer Paternal Aunt    • Heart attack Maternal Grandmother    • Heart disease Maternal Grandmother    • Pancreatic cancer  "Maternal Grandfather    • Colon cancer Paternal Grandmother    • Heart attack Paternal Grandfather    • Cancer Paternal Grandfather           Objective     Vitals:    12/06/22 0803   BP: 149/91   Pulse: 61   Resp: 16   Temp: 97.1 °F (36.2 °C)   TempSrc: Temporal   SpO2: 98%   Weight: 85.5 kg (188 lb 9.6 oz)   Height: 166.4 cm (65.51\")   PainSc:   4   PainLoc: Knee  Comment: Both legs and Knees     Current Status 12/6/2022   ECOG score 0       Physical Exam        GENERAL:  Well-developed, well-nourished  Patient  in no acute distress.   SKIN:  Warm, dry ,NO purpura ,no rash.  HEENT:  Pupils were equal and reactive to light and accomodation, conjunctivae noninjected, normal extraocular movements, normal visual acuity.   NECK:  Supple with good range of motion; NODULAR thyromegaly , no JVD or bruits,.No carotid artery pain, no carotid abnormal pulsation   LYMPHATICS:  No cervical, NO supraclavicular, NO axillary, NO inguinal adenopathies.  CARDIAC   normal rate , regular rhythm, without murmur,NO rubs NO S3 NO S4   LUNGS: normal breath sounds bilateral, no wheezing, NO rhonchi, NO crackles ,NO rubs.  VASCULAR VENOUS: no cyanosis, NO collateral circulation, NO varicosities, NO edema, NO palpable cords, NO pain,NO erythema, NO pigmentation of the skin.  ABDOMEN:  Soft, NO pain,no hepatomegaly, no splenomegaly,no masses, no ascites, no collateral circulation,no distention.  EXTREMITIES  AND SPINE:  No clubbing, no cyanosis ,no deformities , CALVES pain .No kyphosis,  no pain in spine, no pain in ribs , no pain in pelvic bone.  NEUROLOGICAL:  Patient was awake, alert, oriented to time, person and place.        RECENT LABS:  Hematology WBC   Date Value Ref Range Status   12/02/2022 9.13 3.40 - 10.80 10*3/mm3 Final   12/14/2021 12.38 (H) 4.5 - 11.0 10*3/uL Final     RBC   Date Value Ref Range Status   12/02/2022 4.55 3.77 - 5.28 10*6/mm3 Final   12/14/2021 4.24 4.0 - 5.2 10*6/uL Final     Hemoglobin   Date Value Ref Range " Status   12/02/2022 13.0 12.0 - 15.9 g/dL Final   12/14/2021 12.2 12.0 - 16.0 g/dL Final     Hematocrit   Date Value Ref Range Status   12/02/2022 39.9 34.0 - 46.6 % Final   12/14/2021 37.3 36.0 - 46.0 % Final     Platelets   Date Value Ref Range Status   12/02/2022 525 (H) 140 - 450 10*3/mm3 Final   12/14/2021 495 (H) 140 - 440 10*3/uL Final       CBC:    WBC   Date Value Ref Range Status   12/02/2022 9.13 3.40 - 10.80 10*3/mm3 Final   12/14/2021 12.38 (H) 4.5 - 11.0 10*3/uL Final     RBC   Date Value Ref Range Status   12/02/2022 4.55 3.77 - 5.28 10*6/mm3 Final   12/14/2021 4.24 4.0 - 5.2 10*6/uL Final     Hemoglobin   Date Value Ref Range Status   12/02/2022 13.0 12.0 - 15.9 g/dL Final   12/14/2021 12.2 12.0 - 16.0 g/dL Final     Hematocrit   Date Value Ref Range Status   12/02/2022 39.9 34.0 - 46.6 % Final   12/14/2021 37.3 36.0 - 46.0 % Final     MCV   Date Value Ref Range Status   12/02/2022 87.7 79.0 - 97.0 fL Final   12/14/2021 88.0 80.0 - 100.0 fL Final     MCH   Date Value Ref Range Status   12/02/2022 28.6 26.6 - 33.0 pg Final   12/14/2021 28.8 26.0 - 34.0 pg Final     MCHC   Date Value Ref Range Status   12/02/2022 32.6 31.5 - 35.7 g/dL Final   12/14/2021 32.7 31.0 - 37.0 g/dL Final     RDW   Date Value Ref Range Status   12/02/2022 13.7 12.3 - 15.4 % Final   12/14/2021 13.9 12.0 - 16.8 % Final     RDW-SD   Date Value Ref Range Status   12/02/2022 44.2 37.0 - 54.0 fl Final     MPV   Date Value Ref Range Status   12/02/2022 9.3 6.0 - 12.0 fL Final   12/14/2021 9.5 8.4 - 12.4 fL Final     Platelets   Date Value Ref Range Status   12/02/2022 525 (H) 140 - 450 10*3/mm3 Final   12/14/2021 495 (H) 140 - 440 10*3/uL Final     Neutrophil Rel %   Date Value Ref Range Status   12/14/2021 63.8 45 - 80 % Final     Neutrophil %   Date Value Ref Range Status   12/02/2022 58.4 42.7 - 76.0 % Final     Lymphocyte Rel %   Date Value Ref Range Status   12/14/2021 24.4 15 - 50 % Final     Lymphocyte %   Date Value Ref Range  Status   12/02/2022 31.4 19.6 - 45.3 % Final     Monocyte Rel %   Date Value Ref Range Status   12/14/2021 9.0 0 - 15 % Final     Monocyte %   Date Value Ref Range Status   12/02/2022 7.4 5.0 - 12.0 % Final     Eosinophil %   Date Value Ref Range Status   12/02/2022 1.5 0.3 - 6.2 % Final   12/14/2021 1.4 0 - 7 % Final     Basophil Rel %   Date Value Ref Range Status   12/14/2021 0.6 0 - 2 % Final     Basophil %   Date Value Ref Range Status   12/02/2022 0.8 0.0 - 1.5 % Final     Immature Grans %   Date Value Ref Range Status   12/02/2022 0.5 0.0 - 0.5 % Final   12/14/2021 0.8 0.0 - 1.0 % Final     Neutrophils Absolute   Date Value Ref Range Status   12/14/2021 7.89 2.0 - 8.8 10*3/uL Final     Neutrophils, Absolute   Date Value Ref Range Status   12/02/2022 5.32 1.70 - 7.00 10*3/mm3 Final     Lymphocytes Absolute   Date Value Ref Range Status   12/14/2021 3.02 0.7 - 5.5 10*3/uL Final     Lymphocytes, Absolute   Date Value Ref Range Status   12/02/2022 2.87 0.70 - 3.10 10*3/mm3 Final     Monocytes Absolute   Date Value Ref Range Status   12/14/2021 1.12 0.0 - 1.7 10*3/uL Final     Monocytes, Absolute   Date Value Ref Range Status   12/02/2022 0.68 0.10 - 0.90 10*3/mm3 Final     Eosinophils Absolute   Date Value Ref Range Status   12/14/2021 0.17 0.0 - 0.8 10*3/uL Final     Eosinophils, Absolute   Date Value Ref Range Status   12/02/2022 0.14 0.00 - 0.40 10*3/mm3 Final     Basophils Absolute   Date Value Ref Range Status   12/14/2021 0.08 0.0 - 0.2 10*3/uL Final     Basophils, Absolute   Date Value Ref Range Status   12/02/2022 0.07 0.00 - 0.20 10*3/mm3 Final     Immature Grans, Absolute   Date Value Ref Range Status   12/02/2022 0.05 0.00 - 0.05 10*3/mm3 Final   12/14/2021 0.10 0.00 - 0.10 10*3/uL Final     nRBC   Date Value Ref Range Status   12/02/2022 0.0 0.0 - 0.2 /100 WBC Final        CMP:    Glucose   Date Value Ref Range Status   11/14/2022 93 74 - 124 mg/dL Final     BUN   Date Value Ref Range Status    11/14/2022 11 6 - 20 mg/dL Final   08/07/2020 5 (L) 7 - 20 mg/dL Final     Creatinine   Date Value Ref Range Status   11/14/2022 0.80 0.60 - 1.10 mg/dL Final   08/07/2020 0.6 (L) 0.7 - 1.5 mg/dL Final     Sodium   Date Value Ref Range Status   11/14/2022 137 134 - 145 mmol/L Final   08/07/2020 137 137 - 145 mmol/L Final     Potassium   Date Value Ref Range Status   11/14/2022 4.0 3.5 - 4.7 mmol/L Final   08/07/2020 3.5 3.5 - 5.1 mmol/L Final     Chloride   Date Value Ref Range Status   11/14/2022 101 98 - 107 mmol/L Final   08/07/2020 106 98 - 107 mmol/L Final     CO2   Date Value Ref Range Status   11/14/2022 23.2 22.0 - 29.0 mmol/L Final     Total CO2   Date Value Ref Range Status   08/07/2020 21 (L) 22 - 30 mmol/L Final     Calcium   Date Value Ref Range Status   11/14/2022 9.9 8.5 - 10.2 mg/dL Final   08/07/2020 8.4 8.4 - 10.2 mg/dL Final     Total Protein   Date Value Ref Range Status   11/14/2022 8.8 (H) 6.3 - 8.0 g/dL Final   08/05/2020 7.5 6.3 - 8.2 g/dL Final     Albumin   Date Value Ref Range Status   11/14/2022 4.50 3.50 - 5.20 g/dL Final   08/05/2020 4.1 3.5 - 5.0 g/dL Final     ALT (SGPT)   Date Value Ref Range Status   11/14/2022 13 0 - 33 U/L Final   08/05/2020 25 0 - 35 U/L Final     Comment:     If ALT testing ordered prior to 2359 on 11/16/19, please use reference range: Male 0-50, Female 0-35.  [facility] [employer] switched to a new ALT assay on 11/16/19 which yields results 10 U/L lower than the old assay.     AST (SGOT)   Date Value Ref Range Status   11/14/2022 26 0 - 32 U/L Final   08/05/2020 39 15 - 46 U/L Final     Alkaline Phosphatase   Date Value Ref Range Status   11/14/2022 83 38 - 116 U/L Final   08/05/2020 65 38 - 126 U/L Final     Total Bilirubin   Date Value Ref Range Status   11/14/2022 0.2 0.2 - 1.2 mg/dL Final   08/05/2020 0.3 0.2 - 1.3 mg/dL Final     Globulin   Date Value Ref Range Status   11/14/2022 4.3 (H) 1.8 - 3.5 gm/dL Final   08/05/2020 3.4 1.5 - 4.5 g/dL Final      A/G Ratio   Date Value Ref Range Status   11/14/2022 1.0 (L) 1.1 - 2.4 g/dL Final     BUN/Creatinine Ratio   Date Value Ref Range Status   11/14/2022 13.8 7.3 - 30.0 Final   08/07/2020 8.6 RATIO Final     Anion Gap   Date Value Ref Range Status   11/14/2022 12.8 5.0 - 15.0 mmol/L Final             Assessment & Plan     Diagnoses and all orders for this visit:    1. MGUS (monoclonal gammopathy of unknown significance) (Primary)  -     Comprehensive Metabolic Panel; Future  -     CBC & Differential; Future  -     SUNNY,PE and FLC, Serum; Future    2. Bandemia    3. Thrombocytosis    4. Muscle pain    5. Goiter, nodular       On 12/06/2022 in regard to the assessment on this patient about her monoclonal protein we have a great difficulty obtaining the monoclonal protein studies performed by Darrin Ayala MD, in the office. We have performed laboratory testing 2 days ago, we do not have the report of this back both in blood and urine. Urinalysis documented a minimal degree of proteinuria with some ketosis and specific density of 1030. The patient has been dehydrated because she is very nauseated with the Cymbalta that was prescribed to her and I pointed out to the patient that probably the best thing to do will be to discontinue the Cymbalta until she seen Darrin Ayala MD, to allow the nausea to go away, that way she has proper nutrition and proper hydration. I will call her with the report of her monoclonal protein in blood and urine once that it becomes available. My personal belief with a normal hemoglobin is that this patient has monoclonal gammopathy associated with inflammatory process of Sjogren's syndrome that probably has no significance.     In the same token the patient's white count is minimally elevated and also her platelet count is minimally elevated. All of this goes against any monoclonal protein severe process that typically goes along with leukopenia and thrombocytopenia.    From the point of view of her  thrombocytosis I think this is a reactive phenomenon, her LDH is normal, her sedimentation rate is minimally elevated and she has no iron deficiency. Her ferritin, iron profile and reticulated hemoglobin are normal.     In regard to muscle pain I did perform a CPK and aldolase that was normal. A B12 level was normal, a folic acid level was normal and I did perform a hemoglobin electrophoresis that disclosed no evidence of hemoglobinopathy. Therefore she obviously has no sickle cell or things of this nature, therefore the muscle pain is probably a result of her Sjogren's syndrome. In this entity after my personal review muscle pain could be one of the features.     Finally she has a nodular goiter, she has a normal TSH and a normal T4. I will let Beau Frye MD, the patient's Primary Physician address this issue eventually with an ultrasound and clinical examination. The patient has antithyroid antibody positivity and very likely in my opinion she probably has a component of Hashimoto's thyroiditis.     Once that I get the report of her monoclonal protein in blood and urine an addendum will be dictated to this note and the patient will be informed. I pointed out to her that eventually we would like to review these studies, depending on what we find in 4-6 months just to continue monitoring her on clinical grounds. She agrees to proceed.     I did not prescribe any medications for her. I entered into the Epic system Cymbalta side effects significant nausea and this medicine was advised for her to be discontinued until she sees Darrin Ayala MD, again.

## 2022-12-06 NOTE — H&P (VIEW-ONLY)
Subjective         DURING THE VISIT WITH THE PATIENT TODAY , PATIENT HAD FACE MASK, MY MEDICAL ASSISTANT AND I  HAD PROPPER PROTECTIVE EQUIPMENT, AND I DID HAND HYGIENE WITH SOAP AND WATER BEFORE AND AFTER THE VISIT.     REASONS FOR FOLLOWUP:  1. 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 NO ANEMIA, NORMAL CREATININE,NORMAL CALCIUM, NO BONE PAIN NO ADENOPATHIES , NORMAL DIFFERENTIAL IN WBC.  2. LEUKOCYTOSIS AND THROMBOCYTOSIS.  3. GOITER VERY LIKELY HASHIMOTO'S THYROIDITIS.  4. SJOGREN'S SYNDROME.      HISTORY OF PRESENT ILLNESS:   The patient is known for having these numbers abnormal now for several weeks. Also she has previous history of anemia at the time that she had a cerebral aneurysm and clip 2 years ago. Anemia has resolved. The patient complains now days of significant muscle pain and neuropathic symptomatology in her upper and lower extremities with numbness and tingling. She also complains of fatigue for any activity. She has not had any fevers, chills, night sweats, pruritus or adenopathies. Her appetite and weight have remained stable. Her bowel activity and urination are normal. She has not had any vaginal bleeding even though she has had in the past a hysterectomy; she never had oophorectomy. She still has the feeling of breast sensitivity at the time of possible menstrual cycle. The patient denies any rashes in the skin, alopecia or sores in her mouth. She has not had any infections. She has not had any obvious joint pain but she has very significant fatigue to the point that she gets exhausted by the end of her work shift. She works for the [employer]. She gets home and she just lies in bed.      She has been found by Dr. GRUBER to have Sjogren syndrome. Also she has had antithyroglobulin antibodies. The patient also has been found to have a goiter today on her clinical examination. She was never aware of that. She attributes the neuropathic symptomatology in her  legs and upper extremities to a cerebral aneurysm that she had 2 years ago. She has had migraines and headaches for a long time. This triggered MRI assessment that diagnosed her cerebral aneurysm. This never ruptured.   The patient was brought back to the office on 12/06/2022 in order to be reviewed. In the meantime after we had a conversation with Darrin Ayala MD, the Rheumatologist he made me aware that the patient had also serum protein electrophoresis that disclosed a monoclonal protein. We have been trying to obtain the report of that test from the office being impossible. In any event we called the patient back a few days ago, last Friday to pursue a serum protein immunoelectrophoresis and she has collected a urine of 24 hours for urine protein immunoelectrophoresis. The report of this sample is pending today. In the meantime a urinalysis was performed that showed a specific density of 1030, minimal ketones and very small amount of protein in the urine. The rest of the laboratory parameters obtained during the original visit are back and will be discussed below.     The patient continues having nausea associated with Cymbalta use to the point that she is not eating or drinking enough. This medicine has not triggered any benefit in regard to the control of the muscle pain and discomfort that she is experiencing. Besides this her bowel activity is normal. She has not had any new cardiovascular or respiratory issues. She has not had any fever, infection or new sites of bone pain. We have also a multitude of laboratory parameters obtained during the original visit for review today.     ·       Past Medical History:   Diagnosis Date   • Achilles tendonitis    • Anemia    • Anxiety    • Arthritis     ankles, shoulders   • Asthma     childhood   • Benign essential hypertension    • Cerebral aneurysm    • GERD (gastroesophageal reflux disease)    • Headache    • History of anemia    • History of E. coli septicemia    •  Rotator cuff syndrome    • Seasonal allergies         Past Surgical History:   Procedure Laterality Date   • ABSCESS DRAINAGE  2000    Renal abscess   • CEREBRAL ANGIOGRAM  2020   • CRANIOTOMY  2020    with angiogram for aneurysm   • HYSTERECTOMY  2017    partial, both ovaries remain   • ROTATOR CUFF REPAIR Right 2012   • SHOULDER SURGERY          Current Outpatient Medications on File Prior to Visit   Medication Sig Dispense Refill   • albuterol sulfate  (90 Base) MCG/ACT inhaler Ventolin HFA 90 mcg/actuation aerosol inhaler     • amLODIPine (NORVASC) 5 MG tablet      • ascorbic acid (VITAMIN C) 500 MG tablet      • Emgality 120 MG/ML auto-injector pen INJECT 1 ML SUBCUTANEOUSLY ONCE EVERY MONTH     • Magnesium Oxide 500 MG tablet magnesium oxide 500 mg tablet   TAKE 1 TABLET BY MOUTH NIGHTLY     • metoprolol tartrate (LOPRESSOR) 50 MG tablet      • multivitamin (THERAGRAN) tablet tablet Take  by mouth Daily.     • Rimegepant Sulfate (NURTEC) 75 MG tablet dispersible tablet Take 1 tablet by mouth Daily As Needed.     • [DISCONTINUED] DULoxetine (CYMBALTA) 30 MG capsule      • [DISCONTINUED] diclofenac (VOLTAREN) 75 MG EC tablet   0   • [DISCONTINUED] nebivolol (BYSTOLIC) 10 MG tablet Take 10 mg by mouth daily.       No current facility-administered medications on file prior to visit.        ALLERGIES:    Allergies   Allergen Reactions   • Coconut Shortness Of Breath   • Eggs Or Egg-Derived Products Diarrhea        Social History     Socioeconomic History   • Marital status:    • Number of children: 3   Tobacco Use   • Smoking status: Never   • Smokeless tobacco: Never   Substance and Sexual Activity   • Alcohol use: Yes   • Drug use: Never        Family History   Problem Relation Age of Onset   • Hypertension Father    • Multiple sclerosis Brother    • Diabetes Brother    • Colon cancer Paternal Aunt    • Heart attack Maternal Grandmother    • Heart disease Maternal Grandmother    • Pancreatic cancer  Maternal Grandfather    • Colon cancer Paternal Grandmother    • Heart attack Paternal Grandfather    • Cancer Paternal Grandfather           Objective     Vitals:    12/06/22 0803   BP: 149/91   Pulse: 61   Resp: 16   Temp: 97.1 °F (36.2 °C)   TempSrc: Temporal   SpO2: 98%   Weight: 85.5 kg (188 lb 9.6 oz)   Height: 166.4 cm (65.51\")   PainSc:   4   PainLoc: Knee  Comment: Both legs and Knees     Current Status 12/6/2022   ECOG score 0       Physical Exam        GENERAL:  Well-developed, well-nourished  Patient  in no acute distress.   SKIN:  Warm, dry ,NO purpura ,no rash.  HEENT:  Pupils were equal and reactive to light and accomodation, conjunctivae noninjected, normal extraocular movements, normal visual acuity.   NECK:  Supple with good range of motion; NODULAR thyromegaly , no JVD or bruits,.No carotid artery pain, no carotid abnormal pulsation   LYMPHATICS:  No cervical, NO supraclavicular, NO axillary, NO inguinal adenopathies.  CARDIAC   normal rate , regular rhythm, without murmur,NO rubs NO S3 NO S4   LUNGS: normal breath sounds bilateral, no wheezing, NO rhonchi, NO crackles ,NO rubs.  VASCULAR VENOUS: no cyanosis, NO collateral circulation, NO varicosities, NO edema, NO palpable cords, NO pain,NO erythema, NO pigmentation of the skin.  ABDOMEN:  Soft, NO pain,no hepatomegaly, no splenomegaly,no masses, no ascites, no collateral circulation,no distention.  EXTREMITIES  AND SPINE:  No clubbing, no cyanosis ,no deformities , CALVES pain .No kyphosis,  no pain in spine, no pain in ribs , no pain in pelvic bone.  NEUROLOGICAL:  Patient was awake, alert, oriented to time, person and place.        RECENT LABS:  Hematology WBC   Date Value Ref Range Status   12/02/2022 9.13 3.40 - 10.80 10*3/mm3 Final   12/14/2021 12.38 (H) 4.5 - 11.0 10*3/uL Final     RBC   Date Value Ref Range Status   12/02/2022 4.55 3.77 - 5.28 10*6/mm3 Final   12/14/2021 4.24 4.0 - 5.2 10*6/uL Final     Hemoglobin   Date Value Ref Range  Status   12/02/2022 13.0 12.0 - 15.9 g/dL Final   12/14/2021 12.2 12.0 - 16.0 g/dL Final     Hematocrit   Date Value Ref Range Status   12/02/2022 39.9 34.0 - 46.6 % Final   12/14/2021 37.3 36.0 - 46.0 % Final     Platelets   Date Value Ref Range Status   12/02/2022 525 (H) 140 - 450 10*3/mm3 Final   12/14/2021 495 (H) 140 - 440 10*3/uL Final       CBC:    WBC   Date Value Ref Range Status   12/02/2022 9.13 3.40 - 10.80 10*3/mm3 Final   12/14/2021 12.38 (H) 4.5 - 11.0 10*3/uL Final     RBC   Date Value Ref Range Status   12/02/2022 4.55 3.77 - 5.28 10*6/mm3 Final   12/14/2021 4.24 4.0 - 5.2 10*6/uL Final     Hemoglobin   Date Value Ref Range Status   12/02/2022 13.0 12.0 - 15.9 g/dL Final   12/14/2021 12.2 12.0 - 16.0 g/dL Final     Hematocrit   Date Value Ref Range Status   12/02/2022 39.9 34.0 - 46.6 % Final   12/14/2021 37.3 36.0 - 46.0 % Final     MCV   Date Value Ref Range Status   12/02/2022 87.7 79.0 - 97.0 fL Final   12/14/2021 88.0 80.0 - 100.0 fL Final     MCH   Date Value Ref Range Status   12/02/2022 28.6 26.6 - 33.0 pg Final   12/14/2021 28.8 26.0 - 34.0 pg Final     MCHC   Date Value Ref Range Status   12/02/2022 32.6 31.5 - 35.7 g/dL Final   12/14/2021 32.7 31.0 - 37.0 g/dL Final     RDW   Date Value Ref Range Status   12/02/2022 13.7 12.3 - 15.4 % Final   12/14/2021 13.9 12.0 - 16.8 % Final     RDW-SD   Date Value Ref Range Status   12/02/2022 44.2 37.0 - 54.0 fl Final     MPV   Date Value Ref Range Status   12/02/2022 9.3 6.0 - 12.0 fL Final   12/14/2021 9.5 8.4 - 12.4 fL Final     Platelets   Date Value Ref Range Status   12/02/2022 525 (H) 140 - 450 10*3/mm3 Final   12/14/2021 495 (H) 140 - 440 10*3/uL Final     Neutrophil Rel %   Date Value Ref Range Status   12/14/2021 63.8 45 - 80 % Final     Neutrophil %   Date Value Ref Range Status   12/02/2022 58.4 42.7 - 76.0 % Final     Lymphocyte Rel %   Date Value Ref Range Status   12/14/2021 24.4 15 - 50 % Final     Lymphocyte %   Date Value Ref Range  Status   12/02/2022 31.4 19.6 - 45.3 % Final     Monocyte Rel %   Date Value Ref Range Status   12/14/2021 9.0 0 - 15 % Final     Monocyte %   Date Value Ref Range Status   12/02/2022 7.4 5.0 - 12.0 % Final     Eosinophil %   Date Value Ref Range Status   12/02/2022 1.5 0.3 - 6.2 % Final   12/14/2021 1.4 0 - 7 % Final     Basophil Rel %   Date Value Ref Range Status   12/14/2021 0.6 0 - 2 % Final     Basophil %   Date Value Ref Range Status   12/02/2022 0.8 0.0 - 1.5 % Final     Immature Grans %   Date Value Ref Range Status   12/02/2022 0.5 0.0 - 0.5 % Final   12/14/2021 0.8 0.0 - 1.0 % Final     Neutrophils Absolute   Date Value Ref Range Status   12/14/2021 7.89 2.0 - 8.8 10*3/uL Final     Neutrophils, Absolute   Date Value Ref Range Status   12/02/2022 5.32 1.70 - 7.00 10*3/mm3 Final     Lymphocytes Absolute   Date Value Ref Range Status   12/14/2021 3.02 0.7 - 5.5 10*3/uL Final     Lymphocytes, Absolute   Date Value Ref Range Status   12/02/2022 2.87 0.70 - 3.10 10*3/mm3 Final     Monocytes Absolute   Date Value Ref Range Status   12/14/2021 1.12 0.0 - 1.7 10*3/uL Final     Monocytes, Absolute   Date Value Ref Range Status   12/02/2022 0.68 0.10 - 0.90 10*3/mm3 Final     Eosinophils Absolute   Date Value Ref Range Status   12/14/2021 0.17 0.0 - 0.8 10*3/uL Final     Eosinophils, Absolute   Date Value Ref Range Status   12/02/2022 0.14 0.00 - 0.40 10*3/mm3 Final     Basophils Absolute   Date Value Ref Range Status   12/14/2021 0.08 0.0 - 0.2 10*3/uL Final     Basophils, Absolute   Date Value Ref Range Status   12/02/2022 0.07 0.00 - 0.20 10*3/mm3 Final     Immature Grans, Absolute   Date Value Ref Range Status   12/02/2022 0.05 0.00 - 0.05 10*3/mm3 Final   12/14/2021 0.10 0.00 - 0.10 10*3/uL Final     nRBC   Date Value Ref Range Status   12/02/2022 0.0 0.0 - 0.2 /100 WBC Final        CMP:    Glucose   Date Value Ref Range Status   11/14/2022 93 74 - 124 mg/dL Final     BUN   Date Value Ref Range Status    11/14/2022 11 6 - 20 mg/dL Final   08/07/2020 5 (L) 7 - 20 mg/dL Final     Creatinine   Date Value Ref Range Status   11/14/2022 0.80 0.60 - 1.10 mg/dL Final   08/07/2020 0.6 (L) 0.7 - 1.5 mg/dL Final     Sodium   Date Value Ref Range Status   11/14/2022 137 134 - 145 mmol/L Final   08/07/2020 137 137 - 145 mmol/L Final     Potassium   Date Value Ref Range Status   11/14/2022 4.0 3.5 - 4.7 mmol/L Final   08/07/2020 3.5 3.5 - 5.1 mmol/L Final     Chloride   Date Value Ref Range Status   11/14/2022 101 98 - 107 mmol/L Final   08/07/2020 106 98 - 107 mmol/L Final     CO2   Date Value Ref Range Status   11/14/2022 23.2 22.0 - 29.0 mmol/L Final     Total CO2   Date Value Ref Range Status   08/07/2020 21 (L) 22 - 30 mmol/L Final     Calcium   Date Value Ref Range Status   11/14/2022 9.9 8.5 - 10.2 mg/dL Final   08/07/2020 8.4 8.4 - 10.2 mg/dL Final     Total Protein   Date Value Ref Range Status   11/14/2022 8.8 (H) 6.3 - 8.0 g/dL Final   08/05/2020 7.5 6.3 - 8.2 g/dL Final     Albumin   Date Value Ref Range Status   11/14/2022 4.50 3.50 - 5.20 g/dL Final   08/05/2020 4.1 3.5 - 5.0 g/dL Final     ALT (SGPT)   Date Value Ref Range Status   11/14/2022 13 0 - 33 U/L Final   08/05/2020 25 0 - 35 U/L Final     Comment:     If ALT testing ordered prior to 2359 on 11/16/19, please use reference range: Male 0-50, Female 0-35.  [employer] switched to a new ALT assay on 11/16/19 which yields results 10 U/L lower than the old assay.     AST (SGOT)   Date Value Ref Range Status   11/14/2022 26 0 - 32 U/L Final   08/05/2020 39 15 - 46 U/L Final     Alkaline Phosphatase   Date Value Ref Range Status   11/14/2022 83 38 - 116 U/L Final   08/05/2020 65 38 - 126 U/L Final     Total Bilirubin   Date Value Ref Range Status   11/14/2022 0.2 0.2 - 1.2 mg/dL Final   08/05/2020 0.3 0.2 - 1.3 mg/dL Final     Globulin   Date Value Ref Range Status   11/14/2022 4.3 (H) 1.8 - 3.5 gm/dL Final   08/05/2020 3.4 1.5 - 4.5 g/dL Final      A/G Ratio   Date Value Ref Range Status   11/14/2022 1.0 (L) 1.1 - 2.4 g/dL Final     BUN/Creatinine Ratio   Date Value Ref Range Status   11/14/2022 13.8 7.3 - 30.0 Final   08/07/2020 8.6 RATIO Final     Anion Gap   Date Value Ref Range Status   11/14/2022 12.8 5.0 - 15.0 mmol/L Final             Assessment & Plan     Diagnoses and all orders for this visit:    1. MGUS (monoclonal gammopathy of unknown significance) (Primary)  -     Comprehensive Metabolic Panel; Future  -     CBC & Differential; Future  -     SUNNY,PE and FLC, Serum; Future    2. Bandemia    3. Thrombocytosis    4. Muscle pain    5. Goiter, nodular       On 12/06/2022 in regard to the assessment on this patient about her monoclonal protein we have a great difficulty obtaining the monoclonal protein studies performed by Darrin Ayala MD, in the office. We have performed laboratory testing 2 days ago, we do not have the report of this back both in blood and urine. Urinalysis documented a minimal degree of proteinuria with some ketosis and specific density of 1030. The patient has been dehydrated because she is very nauseated with the Cymbalta that was prescribed to her and I pointed out to the patient that probably the best thing to do will be to discontinue the Cymbalta until she seen Darrin Ayala MD, to allow the nausea to go away, that way she has proper nutrition and proper hydration. I will call her with the report of her monoclonal protein in blood and urine once that it becomes available. My personal belief with a normal hemoglobin is that this patient has monoclonal gammopathy associated with inflammatory process of Sjogren's syndrome that probably has no significance.     In the same token the patient's white count is minimally elevated and also her platelet count is minimally elevated. All of this goes against any monoclonal protein severe process that typically goes along with leukopenia and thrombocytopenia.    From the point of view of her  thrombocytosis I think this is a reactive phenomenon, her LDH is normal, her sedimentation rate is minimally elevated and she has no iron deficiency. Her ferritin, iron profile and reticulated hemoglobin are normal.     In regard to muscle pain I did perform a CPK and aldolase that was normal. A B12 level was normal, a folic acid level was normal and I did perform a hemoglobin electrophoresis that disclosed no evidence of hemoglobinopathy. Therefore she obviously has no sickle cell or things of this nature, therefore the muscle pain is probably a result of her Sjogren's syndrome. In this entity after my personal review muscle pain could be one of the features.     Finally she has a nodular goiter, she has a normal TSH and a normal T4. I will let Beau Frye MD, the patient's Primary Physician address this issue eventually with an ultrasound and clinical examination. The patient has antithyroid antibody positivity and very likely in my opinion she probably has a component of Hashimoto's thyroiditis.     Once that I get the report of her monoclonal protein in blood and urine an addendum will be dictated to this note and the patient will be informed. I pointed out to her that eventually we would like to review these studies, depending on what we find in 4-6 months just to continue monitoring her on clinical grounds. She agrees to proceed.     I did not prescribe any medications for her. I entered into the Epic system Cymbalta side effects significant nausea and this medicine was advised for her to be discontinued until she sees Darrin Ayala MD, again.

## 2022-12-07 LAB
ALBUMIN 24H MFR UR ELPH: 40.6 %
ALPHA1 GLOB 24H MFR UR ELPH: 5.8 %
ALPHA2 GLOB 24H MFR UR ELPH: 15.7 %
B-GLOBULIN MFR UR ELPH: 23 %
GAMMA GLOB 24H MFR UR ELPH: 14.9 %
HIV 1 & 2 AB SER-IMP: ABNORMAL
INTERPRETATION UR IFE-IMP: ABNORMAL
M PROTEIN 24H MFR UR ELPH: ABNORMAL %
PROT 24H UR-MRATE: 135 MG/24 HR (ref 30–150)
PROT UR-MCNC: 7.5 MG/DL

## 2022-12-09 DIAGNOSIS — D47.2 MGUS (MONOCLONAL GAMMOPATHY OF UNKNOWN SIGNIFICANCE): Primary | ICD-10-CM

## 2022-12-11 ENCOUNTER — TELEPHONE (OUTPATIENT)
Dept: ONCOLOGY | Facility: CLINIC | Age: 52
End: 2022-12-11

## 2022-12-11 NOTE — TELEPHONE ENCOUNTER
I have called this patient on the telephone on 12/11/2022 to notify her blood sample for serum protein immunoelectrophoresis shows an IgG monoclonal protein in the 2000 category. She also has Bence-Moon protein in the urine, a very small amount that is not quantifiable. Even though the patient has no anemia, leukopenia, thrombocytopenia, her calcium level is normal, creatinine level is normal, I do believe that she needs to proceed with bone marrow testing. My nurse, Maritza Hernandez, will be making this schedule for her to have this done as an outpatient at the hospital by interventional radiologist in the next couple of weeks. We need to bring the patient back to the office for review 10 days after the samples are obtained. The patient agreed to proceed and she was gratified from the phone call.

## 2022-12-13 ENCOUNTER — PRIOR AUTHORIZATION (OUTPATIENT)
Dept: ONCOLOGY | Facility: CLINIC | Age: 52
End: 2022-12-13

## 2022-12-13 NOTE — TELEPHONE ENCOUNTER
No PA needed for B labs. Per [facility]. Tracking # [id number].  Flow [id number], [id number], and [id number]  Flow Interp 00287 and 71411  Morphology 42712  Chromosomes [id number] and [id number].

## 2022-12-24 NOTE — PROGRESS NOTES
01/04/23 0001   Pre-Procedure Phone Call   Procedure Time Verified Yes   Arrival Time 0730   Procedure Location Verified Yes   Medical History Reviewed No   NPO Status Reinforced Yes   Ride and Caregiver Arranged Yes   Patient Knows to Bring Current Medications No   Bring Outside Films Requested No

## 2023-01-04 ENCOUNTER — HOSPITAL ENCOUNTER (OUTPATIENT)
Dept: CT IMAGING | Facility: HOSPITAL | Age: 53
Discharge: HOME | End: 2023-01-04
Admitting: INTERNAL MEDICINE
Payer: COMMERCIAL

## 2023-01-04 VITALS
DIASTOLIC BLOOD PRESSURE: 86 MMHG | SYSTOLIC BLOOD PRESSURE: 122 MMHG | HEART RATE: 69 BPM | BODY MASS INDEX: 31.65 KG/M2 | HEIGHT: 65 IN | OXYGEN SATURATION: 94 % | WEIGHT: 190 LBS | TEMPERATURE: 97.8 F | RESPIRATION RATE: 16 BRPM

## 2023-01-04 DIAGNOSIS — D47.2 MGUS (MONOCLONAL GAMMOPATHY OF UNKNOWN SIGNIFICANCE): ICD-10-CM

## 2023-01-04 LAB
BASOPHILS # BLD AUTO: 0.05 10*3/MM3 (ref 0–0.2)
BASOPHILS NFR BLD AUTO: 0.5 % (ref 0–1.5)
DEPRECATED RDW RBC AUTO: 41.1 FL (ref 37–54)
EOSINOPHIL # BLD AUTO: 0.4 10*3/MM3 (ref 0–0.4)
EOSINOPHIL NFR BLD AUTO: 4.3 % (ref 0.3–6.2)
ERYTHROCYTE [DISTWIDTH] IN BLOOD BY AUTOMATED COUNT: 13.3 % (ref 12.3–15.4)
HCT VFR BLD AUTO: 35.1 % (ref 34–46.6)
HGB BLD-MCNC: 11.9 G/DL (ref 12–15.9)
IMM GRANULOCYTES # BLD AUTO: 0.06 10*3/MM3 (ref 0–0.05)
IMM GRANULOCYTES NFR BLD AUTO: 0.6 % (ref 0–0.5)
LYMPHOCYTES # BLD AUTO: 2.83 10*3/MM3 (ref 0.7–3.1)
LYMPHOCYTES NFR BLD AUTO: 30.6 % (ref 19.6–45.3)
MCH RBC QN AUTO: 28.3 PG (ref 26.6–33)
MCHC RBC AUTO-ENTMCNC: 33.9 G/DL (ref 31.5–35.7)
MCV RBC AUTO: 83.4 FL (ref 79–97)
MONOCYTES # BLD AUTO: 0.91 10*3/MM3 (ref 0.1–0.9)
MONOCYTES NFR BLD AUTO: 9.8 % (ref 5–12)
NEUTROPHILS NFR BLD AUTO: 4.99 10*3/MM3 (ref 1.7–7)
NEUTROPHILS NFR BLD AUTO: 54.2 % (ref 42.7–76)
NRBC BLD AUTO-RTO: 0 /100 WBC (ref 0–0.2)
PLATELET # BLD AUTO: 448 10*3/MM3 (ref 140–450)
PMV BLD AUTO: 9.5 FL (ref 6–12)
RBC # BLD AUTO: 4.21 10*6/MM3 (ref 3.77–5.28)
WBC NRBC COR # BLD: 9.24 10*3/MM3 (ref 3.4–10.8)

## 2023-01-04 PROCEDURE — 88311 DECALCIFY TISSUE: CPT | Performed by: INTERNAL MEDICINE

## 2023-01-04 PROCEDURE — 88313 SPECIAL STAINS GROUP 2: CPT | Performed by: INTERNAL MEDICINE

## 2023-01-04 PROCEDURE — 88341 IMHCHEM/IMCYTCHM EA ADD ANTB: CPT | Performed by: INTERNAL MEDICINE

## 2023-01-04 PROCEDURE — 88342 IMHCHEM/IMCYTCHM 1ST ANTB: CPT | Performed by: INTERNAL MEDICINE

## 2023-01-04 PROCEDURE — 88312 SPECIAL STAINS GROUP 1: CPT | Performed by: INTERNAL MEDICINE

## 2023-01-04 PROCEDURE — 88305 TISSUE EXAM BY PATHOLOGIST: CPT | Performed by: INTERNAL MEDICINE

## 2023-01-04 PROCEDURE — 77012 CT SCAN FOR NEEDLE BIOPSY: CPT

## 2023-01-04 PROCEDURE — 88377 M/PHMTRC ALYS ISHQUANT/SEMIQ: CPT

## 2023-01-04 PROCEDURE — 88185 FLOWCYTOMETRY/TC ADD-ON: CPT

## 2023-01-04 PROCEDURE — 25010000002 FENTANYL CITRATE (PF) 50 MCG/ML SOLUTION: Performed by: RADIOLOGY

## 2023-01-04 PROCEDURE — 88182 CELL MARKER STUDY: CPT

## 2023-01-04 PROCEDURE — 88300 SURGICAL PATH GROSS: CPT | Performed by: INTERNAL MEDICINE

## 2023-01-04 PROCEDURE — 99152 MOD SED SAME PHYS/QHP 5/>YRS: CPT

## 2023-01-04 PROCEDURE — 0 LIDOCAINE 1 % SOLUTION: Performed by: RADIOLOGY

## 2023-01-04 PROCEDURE — 85025 COMPLETE CBC W/AUTO DIFF WBC: CPT | Performed by: RADIOLOGY

## 2023-01-04 PROCEDURE — 25010000002 MIDAZOLAM PER 1 MG: Performed by: RADIOLOGY

## 2023-01-04 PROCEDURE — 88184 FLOWCYTOMETRY/ TC 1 MARKER: CPT

## 2023-01-04 RX ORDER — FENTANYL CITRATE 50 UG/ML
INJECTION, SOLUTION INTRAMUSCULAR; INTRAVENOUS AS NEEDED
Status: COMPLETED | OUTPATIENT
Start: 2023-01-04 | End: 2023-01-04

## 2023-01-04 RX ORDER — MIDAZOLAM HYDROCHLORIDE 1 MG/ML
INJECTION INTRAMUSCULAR; INTRAVENOUS AS NEEDED
Status: COMPLETED | OUTPATIENT
Start: 2023-01-04 | End: 2023-01-04

## 2023-01-04 RX ORDER — AMITRIPTYLINE HYDROCHLORIDE 25 MG/1
TABLET, FILM COATED ORAL
COMMUNITY
Start: 2022-12-22 | End: 2023-03-17

## 2023-01-04 RX ORDER — SODIUM CHLORIDE 0.9 % (FLUSH) 0.9 %
3 SYRINGE (ML) INJECTION EVERY 12 HOURS SCHEDULED
Status: DISCONTINUED | OUTPATIENT
Start: 2023-01-04 | End: 2023-01-05 | Stop reason: HOSPADM

## 2023-01-04 RX ORDER — SODIUM CHLORIDE 9 MG/ML
40 INJECTION, SOLUTION INTRAVENOUS AS NEEDED
Status: DISCONTINUED | OUTPATIENT
Start: 2023-01-04 | End: 2023-01-05 | Stop reason: HOSPADM

## 2023-01-04 RX ORDER — LIDOCAINE HYDROCHLORIDE 10 MG/ML
20 INJECTION, SOLUTION INFILTRATION; PERINEURAL ONCE
Status: COMPLETED | OUTPATIENT
Start: 2023-01-04 | End: 2023-01-04

## 2023-01-04 RX ORDER — SODIUM CHLORIDE 9 MG/ML
25 INJECTION, SOLUTION INTRAVENOUS ONCE
Status: COMPLETED | OUTPATIENT
Start: 2023-01-04 | End: 2023-01-04

## 2023-01-04 RX ORDER — HYDROXYZINE HYDROCHLORIDE 25 MG/1
TABLET, FILM COATED ORAL
COMMUNITY
Start: 2022-12-22 | End: 2023-03-17

## 2023-01-04 RX ORDER — SODIUM CHLORIDE 0.9 % (FLUSH) 0.9 %
10 SYRINGE (ML) INJECTION AS NEEDED
Status: DISCONTINUED | OUTPATIENT
Start: 2023-01-04 | End: 2023-01-05 | Stop reason: HOSPADM

## 2023-01-04 RX ADMIN — MIDAZOLAM 1 MG: 1 INJECTION INTRAMUSCULAR; INTRAVENOUS at 09:25

## 2023-01-04 RX ADMIN — FENTANYL CITRATE 50 MCG: 50 INJECTION INTRAMUSCULAR; INTRAVENOUS at 09:26

## 2023-01-04 RX ADMIN — LIDOCAINE HYDROCHLORIDE 20 ML: 10 INJECTION, SOLUTION INFILTRATION; PERINEURAL at 09:30

## 2023-01-04 RX ADMIN — MIDAZOLAM 0.5 MG: 1 INJECTION INTRAMUSCULAR; INTRAVENOUS at 09:29

## 2023-01-04 RX ADMIN — Medication 3 ML: at 09:03

## 2023-01-04 RX ADMIN — FENTANYL CITRATE 25 MCG: 50 INJECTION INTRAMUSCULAR; INTRAVENOUS at 09:29

## 2023-01-04 RX ADMIN — SODIUM CHLORIDE 25 ML/HR: 9 INJECTION, SOLUTION INTRAVENOUS at 09:04

## 2023-01-04 NOTE — POST-PROCEDURE NOTE
POST PROCEDURE NOTE    Procedure: BM biopsy    Pre-Procedure Diagnosis: poss. myeloma    Post-procedure Diagnosis: same    Findings: successful bx     Complications: none    Blood loss: min    Specimen Removed: aspir.&core    Disposition:   Discharge home

## 2023-01-04 NOTE — DISCHARGE INSTRUCTIONS

## 2023-01-04 NOTE — NURSING NOTE
Patient arrived to radiology triage for bone marrow biopsy.    Protective goggles and mask in place with all patient interactions today

## 2023-01-05 ENCOUNTER — TELEPHONE (OUTPATIENT)
Dept: INTERVENTIONAL RADIOLOGY/VASCULAR | Facility: HOSPITAL | Age: 53
End: 2023-01-05
Payer: COMMERCIAL

## 2023-01-18 ENCOUNTER — DOCUMENTATION (OUTPATIENT)
Dept: PHARMACY | Facility: HOSPITAL | Age: 53
End: 2023-01-18
Payer: COMMERCIAL

## 2023-01-18 ENCOUNTER — OFFICE VISIT (OUTPATIENT)
Dept: ONCOLOGY | Facility: CLINIC | Age: 53
End: 2023-01-18
Payer: COMMERCIAL

## 2023-01-18 ENCOUNTER — LAB (OUTPATIENT)
Dept: LAB | Facility: HOSPITAL | Age: 53
End: 2023-01-18
Payer: COMMERCIAL

## 2023-01-18 ENCOUNTER — SPECIALTY PHARMACY (OUTPATIENT)
Dept: PHARMACY | Facility: HOSPITAL | Age: 53
End: 2023-01-18
Payer: COMMERCIAL

## 2023-01-18 VITALS
RESPIRATION RATE: 18 BRPM | SYSTOLIC BLOOD PRESSURE: 150 MMHG | DIASTOLIC BLOOD PRESSURE: 96 MMHG | OXYGEN SATURATION: 98 % | TEMPERATURE: 97.5 F | WEIGHT: 190.3 LBS | HEART RATE: 78 BPM | HEIGHT: 65 IN | BODY MASS INDEX: 31.71 KG/M2

## 2023-01-18 DIAGNOSIS — C90.00 MULTIPLE MYELOMA NOT HAVING ACHIEVED REMISSION: Primary | ICD-10-CM

## 2023-01-18 DIAGNOSIS — D47.2 MGUS (MONOCLONAL GAMMOPATHY OF UNKNOWN SIGNIFICANCE): ICD-10-CM

## 2023-01-18 LAB
ALBUMIN SERPL-MCNC: 4.2 G/DL (ref 3.5–5.2)
ALBUMIN/GLOB SERPL: 1 G/DL (ref 1.1–2.4)
ALP SERPL-CCNC: 75 U/L (ref 38–116)
ALT SERPL W P-5'-P-CCNC: 12 U/L (ref 0–33)
ANION GAP SERPL CALCULATED.3IONS-SCNC: 12.4 MMOL/L (ref 5–15)
AST SERPL-CCNC: 15 U/L (ref 0–32)
BASOPHILS # BLD AUTO: 0.06 10*3/MM3 (ref 0–0.2)
BASOPHILS NFR BLD AUTO: 0.5 % (ref 0–1.5)
BILIRUB SERPL-MCNC: 0.3 MG/DL (ref 0.2–1.2)
BUN SERPL-MCNC: 17 MG/DL (ref 6–20)
BUN/CREAT SERPL: 20 (ref 7.3–30)
CALCIUM SPEC-SCNC: 9.6 MG/DL (ref 8.5–10.2)
CHLORIDE SERPL-SCNC: 104 MMOL/L (ref 98–107)
CO2 SERPL-SCNC: 21.6 MMOL/L (ref 22–29)
CREAT SERPL-MCNC: 0.85 MG/DL (ref 0.6–1.1)
DEPRECATED RDW RBC AUTO: 45.2 FL (ref 37–54)
EGFRCR SERPLBLD CKD-EPI 2021: 83.6 ML/MIN/1.73
EOSINOPHIL # BLD AUTO: 0.14 10*3/MM3 (ref 0–0.4)
EOSINOPHIL NFR BLD AUTO: 1.2 % (ref 0.3–6.2)
ERYTHROCYTE [DISTWIDTH] IN BLOOD BY AUTOMATED COUNT: 13.9 % (ref 12.3–15.4)
GLOBULIN UR ELPH-MCNC: 4.2 GM/DL (ref 1.8–3.5)
GLUCOSE SERPL-MCNC: 100 MG/DL (ref 74–124)
HCT VFR BLD AUTO: 39.6 % (ref 34–46.6)
HGB BLD-MCNC: 12.6 G/DL (ref 12–15.9)
IMM GRANULOCYTES # BLD AUTO: 0.07 10*3/MM3 (ref 0–0.05)
IMM GRANULOCYTES NFR BLD AUTO: 0.6 % (ref 0–0.5)
LYMPHOCYTES # BLD AUTO: 2.9 10*3/MM3 (ref 0.7–3.1)
LYMPHOCYTES NFR BLD AUTO: 25.6 % (ref 19.6–45.3)
MCH RBC QN AUTO: 28.3 PG (ref 26.6–33)
MCHC RBC AUTO-ENTMCNC: 31.8 G/DL (ref 31.5–35.7)
MCV RBC AUTO: 89 FL (ref 79–97)
MONOCYTES # BLD AUTO: 0.82 10*3/MM3 (ref 0.1–0.9)
MONOCYTES NFR BLD AUTO: 7.3 % (ref 5–12)
NEUTROPHILS NFR BLD AUTO: 64.8 % (ref 42.7–76)
NEUTROPHILS NFR BLD AUTO: 7.32 10*3/MM3 (ref 1.7–7)
NRBC BLD AUTO-RTO: 0 /100 WBC (ref 0–0.2)
PLATELET # BLD AUTO: 447 10*3/MM3 (ref 140–450)
PMV BLD AUTO: 9.3 FL (ref 6–12)
POTASSIUM SERPL-SCNC: 4 MMOL/L (ref 3.5–4.7)
PROT SERPL-MCNC: 8.4 G/DL (ref 6.3–8)
RBC # BLD AUTO: 4.45 10*6/MM3 (ref 3.77–5.28)
SODIUM SERPL-SCNC: 138 MMOL/L (ref 134–145)
WBC NRBC COR # BLD: 11.31 10*3/MM3 (ref 3.4–10.8)

## 2023-01-18 PROCEDURE — 85025 COMPLETE CBC W/AUTO DIFF WBC: CPT

## 2023-01-18 PROCEDURE — 99215 OFFICE O/P EST HI 40 MIN: CPT | Performed by: INTERNAL MEDICINE

## 2023-01-18 PROCEDURE — 36415 COLL VENOUS BLD VENIPUNCTURE: CPT

## 2023-01-18 PROCEDURE — 80053 COMPREHEN METABOLIC PANEL: CPT

## 2023-01-18 NOTE — PROGRESS NOTES
Staff message rec from Becky JUSTIN, Clinical RN for Dr Freedman-pt will need to start Revlimid 15 mg 21 out of 28 days along with Carey, Velcade and Dex.    I have submitted the PA to the FEP plan through covermymeds for the Revlimid. The request has been approved from 12/19/2022-1/18/2024.    Revlimid is an LDD medication and pt will have to use [facility] for dispense.    Jonelle Hernandez, RN sent to Marianna Mays, [facility]; Carmen Wellington.  Patient is starting Carey, Velcade, Revlimid 15mg three weeks on. One week off. And Dex 20 weekly. I placed plan. I think the PO meds just need to be put in correctly. Thanks! Also sending her Xarelto now.      Carmen Wellington  Specialty Pharmacy Technician

## 2023-01-18 NOTE — PROGRESS NOTES
Subjective         DURING THE VISIT WITH THE PATIENT TODAY , PATIENT HAD FACE MASK, MY MEDICAL ASSISTANT AND I  HAD PROPPER PROTECTIVE EQUIPMENT, AND I DID HAND HYGIENE WITH SOAP AND WATER BEFORE AND AFTER THE VISIT.     REASONS FOR FOLLOWUP:  1. 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 NO ANEMIA, NORMAL CREATININE,NORMAL CALCIUM, NO BONE PAIN NO ADENOPATHIES , NORMAL DIFFERENTIAL IN WBC.  2. LEUKOCYTOSIS AND THROMBOCYTOSIS.  3. GOITER VERY LIKELY HASHIMOTO'S THYROIDITIS.  4. SJOGREN'S SYNDROME.      HISTORY OF PRESENT ILLNESS:    On 01/18/2023 this 52-year-old female returns to the office for followup. She continues having aches and pains in her thighs and her lower extremities continuously and not taking any pain medicine for that. She has no sensation of numbness in her lower extremities as well. Similar symptoms happen in her upper extremities. She has occasional back pain as well. Her appetite and weight remain stable. Her bowel activity is normal. Urination is normal. She has not had any hematuria or passage of blood in the vagina or in the rectum. She has not had any cough or sputum production. No shortness of breath, palpitations or chest pain. She remains functional working at the [employer]. In the interim the patient underwent a bone marrow biopsy and she is here to review this. Pathology shows a picture of multiple myeloma with 18% of plasma cells infiltration in her bone marrow. This will be reviewed below. Obviously this will trigger further radiological assessment for staging of her disease.        Past Medical History:   Diagnosis Date   • Achilles tendonitis    • Anemia    • Anxiety    • Arthritis     ankles, shoulders   • Asthma     childhood   • Benign essential hypertension    • Cerebral aneurysm    • GERD (gastroesophageal reflux disease)    • Headache    • History of anemia    • History of E. coli septicemia    • Rotator cuff syndrome    • Seasonal allergies          Past Surgical History:   Procedure Laterality Date   • ABSCESS DRAINAGE  2000    Renal abscess   • CEREBRAL ANGIOGRAM  2020   • CRANIOTOMY  2020    with angiogram for aneurysm   • HYSTERECTOMY  2017    partial, both ovaries remain   • ROTATOR CUFF REPAIR Right 2012   • SHOULDER SURGERY          Current Outpatient Medications on File Prior to Visit   Medication Sig Dispense Refill   • albuterol sulfate  (90 Base) MCG/ACT inhaler Ventolin HFA 90 mcg/actuation aerosol inhaler     • amitriptyline (ELAVIL) 25 MG tablet      • amLODIPine (NORVASC) 5 MG tablet      • ascorbic acid (VITAMIN C) 500 MG tablet      • Emgality 120 MG/ML auto-injector pen INJECT 1 ML SUBCUTANEOUSLY ONCE EVERY MONTH     • hydrOXYzine (ATARAX) 25 MG tablet      • Magnesium Oxide 500 MG tablet magnesium oxide 500 mg tablet   TAKE 1 TABLET BY MOUTH NIGHTLY     • metoprolol tartrate (LOPRESSOR) 50 MG tablet      • multivitamin (THERAGRAN) tablet tablet Take  by mouth Daily.     • Rimegepant Sulfate (NURTEC) 75 MG tablet dispersible tablet Take 1 tablet by mouth Daily As Needed.       No current facility-administered medications on file prior to visit.        ALLERGIES:    Allergies   Allergen Reactions   • Coconut Shortness Of Breath   • Eggs Or Egg-Derived Products Diarrhea        Social History     Socioeconomic History   • Marital status:    • Number of children: 3   Tobacco Use   • Smoking status: Never   • Smokeless tobacco: Never   Substance and Sexual Activity   • Alcohol use: Yes   • Drug use: Never        Family History   Problem Relation Age of Onset   • Hypertension Father    • Multiple sclerosis Brother    • Diabetes Brother    • Colon cancer Paternal Aunt    • Heart attack Maternal Grandmother    • Heart disease Maternal Grandmother    • Pancreatic cancer Maternal Grandfather    • Colon cancer Paternal Grandmother    • Heart attack Paternal Grandfather    • Cancer Paternal Grandfather           Objective     Vitals:     "01/18/23 0916   BP: 150/96   Pulse: 78   Resp: 18   Temp: 97.5 °F (36.4 °C)   TempSrc: Temporal   SpO2: 98%   Weight: 86.3 kg (190 lb 4.8 oz)   Height: 165.1 cm (65\")   PainSc:   3   PainLoc: Knee  Comment: pt stated both knee and leg pain     Current Status 1/18/2023   ECOG score 0       Physical Exam              GENERAL:  Well-developed, Patient  in no acute distress.   SKIN:  Warm, dry ,NO purpura ,no rash.  HEENT:  Pupils were equal and reactive to light and accomodation, conjunctivae noninjected,  normal visual acuity. Left upper last molar decay no local infection.  NECK:  Supple with good range of motion; no thyromegaly , no JVD or bruits,.No carotid artery pain, no carotid abnormal pulsation   LYMPHATICS:  No cervical, NO supraclavicular, NO axillary, NO inguinal adenopathies.  CARDIAC   normal rate , regular rhythm, without murmur,NO rubs NO S3 NO S4   LUNGS: normal breath sounds bilateral, no wheezing, NO rhonchi, NO crackles ,NO rubs.  VASCULAR VENOUS: no cyanosis, NO collateral circulation, NO varicosities, NO edema, NO palpable cords, NO pain,NO erythema, NO pigmentation of the skin.  ABDOMEN:  Soft, NO pain,no hepatomegaly, no splenomegaly,no masses, no ascites, no collateral circulation,no distention.  EXTREMITIES  AND SPINE:  No clubbing, no cyanosis ,no deformities , upper and lower extremities pain .No kyphosis,  no pain in spine, no pain in ribs , no pain in pelvic bone.  NEUROLOGICAL:  Patient was awake, alert, oriented to time, person and place.            RECENT LABS:  Hematology WBC   Date Value Ref Range Status   01/18/2023 11.31 (H) 3.40 - 10.80 10*3/mm3 Final   12/14/2021 12.38 (H) 4.5 - 11.0 10*3/uL Final     RBC   Date Value Ref Range Status   01/18/2023 4.45 3.77 - 5.28 10*6/mm3 Final   12/14/2021 4.24 4.0 - 5.2 10*6/uL Final     Hemoglobin   Date Value Ref Range Status   01/18/2023 12.6 12.0 - 15.9 g/dL Final   12/14/2021 12.2 12.0 - 16.0 g/dL Final     Hematocrit   Date Value Ref " Range Status   01/18/2023 39.6 34.0 - 46.6 % Final   12/14/2021 37.3 36.0 - 46.0 % Final     Platelets   Date Value Ref Range Status   01/18/2023 447 140 - 450 10*3/mm3 Final   12/14/2021 495 (H) 140 - 440 10*3/uL Final       CBC:    WBC   Date Value Ref Range Status   01/18/2023 11.31 (H) 3.40 - 10.80 10*3/mm3 Final   12/14/2021 12.38 (H) 4.5 - 11.0 10*3/uL Final     RBC   Date Value Ref Range Status   01/18/2023 4.45 3.77 - 5.28 10*6/mm3 Final   12/14/2021 4.24 4.0 - 5.2 10*6/uL Final     Hemoglobin   Date Value Ref Range Status   01/18/2023 12.6 12.0 - 15.9 g/dL Final   12/14/2021 12.2 12.0 - 16.0 g/dL Final     Hematocrit   Date Value Ref Range Status   01/18/2023 39.6 34.0 - 46.6 % Final   12/14/2021 37.3 36.0 - 46.0 % Final     MCV   Date Value Ref Range Status   01/18/2023 89.0 79.0 - 97.0 fL Final   12/14/2021 88.0 80.0 - 100.0 fL Final     MCH   Date Value Ref Range Status   01/18/2023 28.3 26.6 - 33.0 pg Final   12/14/2021 28.8 26.0 - 34.0 pg Final     MCHC   Date Value Ref Range Status   01/18/2023 31.8 31.5 - 35.7 g/dL Final   12/14/2021 32.7 31.0 - 37.0 g/dL Final     RDW   Date Value Ref Range Status   01/18/2023 13.9 12.3 - 15.4 % Final   12/14/2021 13.9 12.0 - 16.8 % Final     RDW-SD   Date Value Ref Range Status   01/18/2023 45.2 37.0 - 54.0 fl Final     MPV   Date Value Ref Range Status   01/18/2023 9.3 6.0 - 12.0 fL Final   12/14/2021 9.5 8.4 - 12.4 fL Final     Platelets   Date Value Ref Range Status   01/18/2023 447 140 - 450 10*3/mm3 Final   12/14/2021 495 (H) 140 - 440 10*3/uL Final     Neutrophil Rel %   Date Value Ref Range Status   12/14/2021 63.8 45 - 80 % Final     Neutrophil %   Date Value Ref Range Status   01/18/2023 64.8 42.7 - 76.0 % Final     Lymphocyte Rel %   Date Value Ref Range Status   12/14/2021 24.4 15 - 50 % Final     Lymphocyte %   Date Value Ref Range Status   01/18/2023 25.6 19.6 - 45.3 % Final     Monocyte Rel %   Date Value Ref Range Status   12/14/2021 9.0 0 - 15 %  Final     Monocyte %   Date Value Ref Range Status   01/18/2023 7.3 5.0 - 12.0 % Final     Eosinophil %   Date Value Ref Range Status   01/18/2023 1.2 0.3 - 6.2 % Final   12/14/2021 1.4 0 - 7 % Final     Basophil Rel %   Date Value Ref Range Status   12/14/2021 0.6 0 - 2 % Final     Basophil %   Date Value Ref Range Status   01/18/2023 0.5 0.0 - 1.5 % Final     Immature Grans %   Date Value Ref Range Status   01/18/2023 0.6 (H) 0.0 - 0.5 % Final   12/14/2021 0.8 0.0 - 1.0 % Final     Neutrophils Absolute   Date Value Ref Range Status   12/14/2021 7.89 2.0 - 8.8 10*3/uL Final     Neutrophils, Absolute   Date Value Ref Range Status   01/18/2023 7.32 (H) 1.70 - 7.00 10*3/mm3 Final     Lymphocytes Absolute   Date Value Ref Range Status   12/14/2021 3.02 0.7 - 5.5 10*3/uL Final     Lymphocytes, Absolute   Date Value Ref Range Status   01/18/2023 2.90 0.70 - 3.10 10*3/mm3 Final     Monocytes Absolute   Date Value Ref Range Status   12/14/2021 1.12 0.0 - 1.7 10*3/uL Final     Monocytes, Absolute   Date Value Ref Range Status   01/18/2023 0.82 0.10 - 0.90 10*3/mm3 Final     Eosinophils Absolute   Date Value Ref Range Status   12/14/2021 0.17 0.0 - 0.8 10*3/uL Final     Eosinophils, Absolute   Date Value Ref Range Status   01/18/2023 0.14 0.00 - 0.40 10*3/mm3 Final     Basophils Absolute   Date Value Ref Range Status   12/14/2021 0.08 0.0 - 0.2 10*3/uL Final     Basophils, Absolute   Date Value Ref Range Status   01/18/2023 0.06 0.00 - 0.20 10*3/mm3 Final     Immature Grans, Absolute   Date Value Ref Range Status   01/18/2023 0.07 (H) 0.00 - 0.05 10*3/mm3 Final   12/14/2021 0.10 0.00 - 0.10 10*3/uL Final     nRBC   Date Value Ref Range Status   01/18/2023 0.0 0.0 - 0.2 /100 WBC Final        CMP:    Glucose   Date Value Ref Range Status   01/18/2023 100 74 - 124 mg/dL Final     BUN   Date Value Ref Range Status   01/18/2023 17 6 - 20 mg/dL Final   08/07/2020 5 (L) 7 - 20 mg/dL Final     Creatinine   Date Value Ref Range  Status   01/18/2023 0.85 0.60 - 1.10 mg/dL Final   08/07/2020 0.6 (L) 0.7 - 1.5 mg/dL Final     Sodium   Date Value Ref Range Status   01/18/2023 138 134 - 145 mmol/L Final   08/07/2020 137 137 - 145 mmol/L Final     Potassium   Date Value Ref Range Status   01/18/2023 4.0 3.5 - 4.7 mmol/L Final   08/07/2020 3.5 3.5 - 5.1 mmol/L Final     Chloride   Date Value Ref Range Status   01/18/2023 104 98 - 107 mmol/L Final   08/07/2020 106 98 - 107 mmol/L Final     CO2   Date Value Ref Range Status   01/18/2023 21.6 (L) 22.0 - 29.0 mmol/L Final     Total CO2   Date Value Ref Range Status   08/07/2020 21 (L) 22 - 30 mmol/L Final     Calcium   Date Value Ref Range Status   01/18/2023 9.6 8.5 - 10.2 mg/dL Final   08/07/2020 8.4 8.4 - 10.2 mg/dL Final     Total Protein   Date Value Ref Range Status   01/18/2023 8.4 (H) 6.3 - 8.0 g/dL Final   08/05/2020 7.5 6.3 - 8.2 g/dL Final     Albumin   Date Value Ref Range Status   01/18/2023 4.2 3.5 - 5.2 g/dL Final   08/05/2020 4.1 3.5 - 5.0 g/dL Final     ALT (SGPT)   Date Value Ref Range Status   01/18/2023 12 0 - 33 U/L Final   08/05/2020 25 0 - 35 U/L Final     Comment:     If ALT testing ordered prior to 2359 on 11/16/19, please use reference range: Male 0-50, Female 0-35.  [facility] [employer] switched to a new ALT assay on 11/16/19 which yields results 10 U/L lower than the old assay.     AST (SGOT)   Date Value Ref Range Status   01/18/2023 15 0 - 32 U/L Final   08/05/2020 39 15 - 46 U/L Final     Alkaline Phosphatase   Date Value Ref Range Status   01/18/2023 75 38 - 116 U/L Final   08/05/2020 65 38 - 126 U/L Final     Total Bilirubin   Date Value Ref Range Status   01/18/2023 0.3 0.2 - 1.2 mg/dL Final   08/05/2020 0.3 0.2 - 1.3 mg/dL Final     Globulin   Date Value Ref Range Status   01/18/2023 4.2 (H) 1.8 - 3.5 gm/dL Final   08/05/2020 3.4 1.5 - 4.5 g/dL Final     A/G Ratio   Date Value Ref Range Status   01/18/2023 1.0 (L) 1.1 - 2.4 g/dL Final     BUN/Creatinine Ratio    Date Value Ref Range Status   01/18/2023 20.0 7.3 - 30.0 Final   08/07/2020 8.6 RATIO Final     Anion Gap   Date Value Ref Range Status   01/18/2023 12.4 5.0 - 15.0 mmol/L Final         Tissue Pathology Exam: [id number]  Order: [id number]   Status: Final result      Visible to patient: Yes (seen)      Next appt: 06/13/2023 at 03:00 PM in Lab (LAB CHAIR 6 [facility])      Dx: MGUS (monoclonal gammopathy of unknow...     Specimen Information: A: Iliac Crest, Right - Biopsy; Bone Marrow Aspirate    [id number] 2: Iliac Crest, Right - Aspirate; Bone Marrow Aspirate    [id number] 3: Iliac Crest, Right - Aspirate; Bone Marrow Aspirate    0 Result Notes      Component    Case Report   Surgical Pathology Report                         Case: [id number]                                   Authorizing Provider:  Estuardo Freedman MD        Collected:           01/04/2023 09:32 AM           Ordering Location:     [facility]  Received:            01/04/2023 09:49 AM                                  CT                                                                            Pathologist:           Kelvin Medellin MD                                                          Specimens:   1) - Iliac Crest, Right - Biopsy, bone marrow bx                                                     2) - Iliac Crest, Right - Aspirate, 4 smears and 1 CB                                                3) - Iliac Crest, Right - Aspirate, 2 green top tubes for cytogenetics and 1 purple                  top tube for flow cytometry sent to [facility]                                                    Clinical Information    Hypergammaglobulinemia presumed monoclonal protein in blood rule out plasma cell dyscrasia. Urine SUNNY positive for Bence Moon proteinuria, kappa type (IgG monoclonal protein with kappa light chain specificity). SP/SUNNY M-spike 1.3 grams/dL (IgG monoclonal protein with kappa light chain specificity).     jat/pkm    Final  Diagnosis   1. Bone Marrow Aspirate, Clot Section and Biopsy ([id number]):  • Plasma cell neoplasm involving 15-18% of a normocellular (40-50%) marrow.  - Immunophenotype: CD27, CD38, VS38c, CD56, ,  and cKappa.        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        - Congo red stain is negative for amyloid deposits.  • Background trilineage hematopoiesis and no increase in blasts (1%).  • Absent iron stores.  • No reticulin or collagenous fibrosis.     Comment: This case was sent to [facility] for outside consultation. The outside consultant essentially concurred with the original preliminary diagnosis and issued the specific diagnosis above. Please refer to [id number] scanned below for diagnosis with complete comment and supplemental information.     jat/pkm    Electronically signed by Kelvin Medellin MD on 1/12/2023 at 0911   Preliminary Diagnosis   1 & 2. Right Iliac Crest, Bone Marrow Biopsy, Aspirate and Clot Section (H&E, Watts Giemsa, Reticulin,                       Trichrome and Iron):               A. Adequately cellular bone marrow with trilineage hematopoiesis involved by  +, CD56+, +                       plasma cell dyscrasia/plasma cell myeloma  (plasma cells account for approximately 15-18% of all nucleated cells).               B. Mild B-lymphocye (CD20+, CD79a+) hyperplasia noted, possibly reactive. No atypical lymphoid aggregates identified.               C. No increased reticulin fibers, myelofibrosis nor amyloid deposits identified by special staining.               D. Absent stainable iron identified by iron staining without ring sideroblasts.                E. No cells extrinsic to the bone marrow identified by routine and/or immunostaining.         3. Right Iliac Crest, Bone Marrow Aspirate:               A. Two green top tubes for cytogenetics and one purple top tube for flow cytometric analysis                        ([facility]) (see  "[id number]).      Preliminary result electronically signed by Kelvin Medellin MD on 1/10/2023 at 0743   Preliminary result electronically signed by Kelvin Medellin MD on 1/5/2023 at 1435   Comment    The bone marrow biopsy, aspirate, and clot sections are reviewed with aspirate material being aspicular.  Biopsy cellularity is estimated at 40%. There is no dysplasia identified in any of the cell lines. No increased blasts are identified by routine staining. There appears to be normal maturation of megakaryocytes without abnormal clustering. Iron stains were performed on the bone marrow biopsy, clot section and aspirate disclosing no stainable iron without ring sideroblasts. Reticulin, Trichrome stains are also performed on the bone marrow biopsy. No increased reticulin fibers nor myelofibrosis is identified by special staining. Immunostains for AE1/AE3, CD34, , CD56, , CD3, CD5, CD79a and CD20 are performed utilizing appropriate controls. There is no evidence of metastatic carcinoma by AE1/AE3 staining. No significantly increased CD34-positive blast population is identified. CD3, CD5, CD79a and CD20 highlight normally distributed T-cells and B-cells with B-lymphocyte hyperplasia noted. . No atypical lymphoid aggregates are identified.  highlights plasma cells also expressing  and CD56 accounting for appropriately 15-18% of all nucleated cells. Congo red stain is judged negative for amyloid deposits. The overall morphologic features appear most consistent with bone marrow involvement by plasma cell dyscrasia. These findings must be correlated with the results of cytogenetics/NGS/FISH when available. This case will be sent to [facility] at the clinician's request with final diagnosis to follow.    Gross Description    1. Iliac Crest, Right - Biopsy.  Received in formalin labeled with the patient's name designated \"right iliac crest bone marrow biopsy\" is a pink to red tubal fragment of chondro-osseous " "tissue measuring 2.3 x 0.2 cm in tubal diameter. The specimen is submitted in Immunocal for complete decalcification after appropriate fixation with specimen to follow in 1A.       Marko/rigoberto/beau  2. Iliac Crest, Right - Aspirate.  Received fresh without fixative labeled with the patient's name and designated \"right iliac crest aspirate\" is a purple top tube of whole blood with approximately 3 ml of clotted blood present. The appropriate smears are made and the residual blood is filtered for cell block in cassette 2A.            mb/carolina/brandee   3. Iliac Crest, Right - Aspirate.  Received fresh without fixative labeled with the patient's name and designated \"right iliac crest aspirate\" are two green top tubes and one purple top tube of whole blood with approximately 9 mL of blood present. The two green tubes are sent for cytogenetic studies and the purple tube is sent for flow cytometric analysis. All specimens are sent to [facility].     Micaela/beau   Resulting Agency [facility]              Specimen Collected: 01/04/23 09:32 EST Last Resulted: 01/12/23 09:11 EST               Assessment & Plan     Diagnoses and all orders for this visit:    1. Multiple myeloma not having achieved remission (HCC) (Primary)  -     DEXA Bone Density Axial; Future  -     MRI Lumbar Spine With & Without Contrast; Future  -     MRI Cervical Spine With & Without Contrast; Future  -     MRI Thoracic Spine With & Without Contrast; Future  -     CBC & Differential; Future  -     Comprehensive Metabolic Panel; Future  -     CBC & Differential; Future  -     Comprehensive Metabolic Panel; Future  -     CBC & Differential; Future  -     Comprehensive Metabolic Panel; Future  -     CBC & Differential; Future  -     Comprehensive Metabolic Panel; Future  -     CBC & Differential; Future  -     Comprehensive Metabolic Panel; Future  -     XR Bone Survey Complete; Future    Other orders  -     Rivaroxaban (Xarelto) 2.5 MG tablet; Take 1 tablet by " mouth Daily.  Dispense: 30 tablet; Refill: 2       On 12/06/2022 in regard to the assessment on this patient about her monoclonal protein we have a great difficulty obtaining the monoclonal protein studies performed by Darrin Ayala MD, in the office. We have performed laboratory testing 2 days ago, we do not have the report of this back both in blood and urine. Urinalysis documented a minimal degree of proteinuria with some ketosis and specific density of 1030. The patient has been dehydrated because she is very nauseated with the Cymbalta that was prescribed to her and I pointed out to the patient that probably the best thing to do will be to discontinue the Cymbalta until she seen Darrin Ayala MD, to allow the nausea to go away, that way she has proper nutrition and proper hydration. I will call her with the report of her monoclonal protein in blood and urine once that it becomes available. My personal belief with a normal hemoglobin is that this patient has monoclonal gammopathy associated with inflammatory process of Sjogren's syndrome that probably has no significance.     In the same token the patient's white count is minimally elevated and also her platelet count is minimally elevated. All of this goes against any monoclonal protein severe process that typically goes along with leukopenia and thrombocytopenia.    From the point of view of her thrombocytosis I think this is a reactive phenomenon, her LDH is normal, her sedimentation rate is minimally elevated and she has no iron deficiency. Her ferritin, iron profile and reticulated hemoglobin are normal.     In regard to muscle pain I did perform a CPK and aldolase that was normal. A B12 level was normal, a folic acid level was normal and I did perform a hemoglobin electrophoresis that disclosed no evidence of hemoglobinopathy. Therefore she obviously has no sickle cell or things of this nature, therefore the muscle pain is probably a result of her Sjogren's  syndrome. In this entity after my personal review muscle pain could be one of the features.     Finally she has a nodular goiter, she has a normal TSH and a normal T4. I will let Beau Frye MD, the patient's Primary Physician address this issue eventually with an ultrasound and clinical examination. The patient has antithyroid antibody positivity and very likely in my opinion she probably has a component of Hashimoto's thyroiditis.     Once that I get the report of her monoclonal protein in blood and urine an addendum will be dictated to this note and the patient will be informed. I pointed out to her that eventually we would like to review these studies, depending on what we find in 4-6 months just to continue monitoring her on clinical grounds. She agrees to proceed.     I did not prescribe any medications for her. I entered into the Epic system Cymbalta side effects significant nausea and this medicine was advised for her to be discontinued until she sees Darrin Ayala MD, again.   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 She has an IgG kappa monoclonal protein. In the interim the patient underwent as posted above a bone marrow aspirate that shows an 18% infiltration of the bone marrow by plasma cells. Besides this the patient had also absence of iron stores. The patient had no evidence of myelofibrosis, lymphoma, leukemia, or myelodysplasia.     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 Congo red stain was negative for amyloid deposition. Given this fact I discussed with the patient today a plan of further staging of her disease that will require DEXA scan to measure quality of bone and osteopenia, osteoporosis, an MRI of the cervical, lumbar, and thoracic spine looking for  lytic lesions and she will require as well formal chemotherapy education and consent for initiation of treatment for her disease. Her treatment will require the combination of Darzalex once a week, Velcade subcutaneously once a week, 3 weeks out of 4, Revlimid 15 mg a day for 3 weeks out of a month and dexamethasone 20 mg once a week. I discussed with her side effects of the medicines including allergic reactions to the administration for the Darzalex and febrile illness, peripheral neuropathy, diarrhea and thrombocytopenia for the Velcade; vein thrombosis, pulmonary embolism, fatigue and rash for the Revlimid, and elevation of blood pressure, elevation of blood sugar, gastric irritation, and hyperactivity with the dexamethasone. The patient will have formal education and consent for this regimen and she will be returning to see us back in a few days in order to go through her radiological assessment.     In anticipation of initiation of treatment I also asked the patient to be seen by her dentist. She has the last upper molar on the left side with major decay. This needs to be removed before any consideration of Zometa administration on this patient.     I also discussed with the patient the need for initiation of a low-dose Xarelto 2.5 mg p.o. daily after she has had her molar extracted for initiation and before initiation of her Revlimid administration.     She will have formal education and consent for this regimen and she will have the need for insurance approval.     I pointed out to the patient that she will require treatment for the next 4 months and obviously will utilize monoclonal protein measurement in blood and urine to figure out the response and benefit of the regimen. Given the fact that she is 52 years old eventually the patient will be referred to the [facility] in consideration of high-dose chemotherapy and stem cell support.     Also I discussed with the patient that down the road  given her absence of iron stores in her bone marrow she will require IV iron therapy. This could be accommodated at some point between all the infusions and treatments that she needs to have. We will request insurance company to let us do this as well.     I will monitor as well the patient on clinical symptomatology. It is difficult to know what are all the aches and pains related that she tells us as we know Sjogren syndrome can produce myopathy and muscle pain but I think a bone survey is necessary to redefine the anatomy of the cranium, long bones, spine, pelvic bone. She will be ready to proceed with this test as well in a few days.    I will be reviewing the patient back in a couple of weeks. Hopefully by then we should be completing the staging and the education and the patient should be ready for initiation of her treatment at that time.     I will communicate this to her rheumatologist, Dr. Ayala, once that this report is completed and available tomorrow.

## 2023-01-19 ENCOUNTER — HOSPITAL ENCOUNTER (OUTPATIENT)
Dept: MRI IMAGING | Facility: HOSPITAL | Age: 53
Discharge: HOME | End: 2023-01-19
Payer: COMMERCIAL

## 2023-01-19 DIAGNOSIS — C90.00 MULTIPLE MYELOMA NOT HAVING ACHIEVED REMISSION: ICD-10-CM

## 2023-01-20 ENCOUNTER — HOSPITAL ENCOUNTER (OUTPATIENT)
Dept: BONE DENSITY | Facility: HOSPITAL | Age: 53
Discharge: HOME | End: 2023-01-20
Payer: COMMERCIAL

## 2023-01-20 ENCOUNTER — HOSPITAL ENCOUNTER (OUTPATIENT)
Dept: GENERAL RADIOLOGY | Facility: HOSPITAL | Age: 53
Discharge: HOME | End: 2023-01-20
Payer: COMMERCIAL

## 2023-01-20 DIAGNOSIS — C90.00 MULTIPLE MYELOMA NOT HAVING ACHIEVED REMISSION: ICD-10-CM

## 2023-01-20 LAB
ALBUMIN SERPL ELPH-MCNC: 4 G/DL (ref 2.9–4.4)
ALBUMIN/GLOB SERPL: 1 {RATIO} (ref 0.7–1.7)
ALPHA1 GLOB SERPL ELPH-MCNC: 0.2 G/DL (ref 0–0.4)
ALPHA2 GLOB SERPL ELPH-MCNC: 0.9 G/DL (ref 0.4–1)
B-GLOBULIN SERPL ELPH-MCNC: 1.1 G/DL (ref 0.7–1.3)
GAMMA GLOB SERPL ELPH-MCNC: 2 G/DL (ref 0.4–1.8)
GLOBULIN SER-MCNC: 4.2 G/DL (ref 2.2–3.9)
IGA SERPL-MCNC: 180 MG/DL (ref 87–352)
IGG SERPL-MCNC: 2357 MG/DL (ref 586–1602)
IGM SERPL-MCNC: 70 MG/DL (ref 26–217)
INTERPRETATION SERPL IEP-IMP: ABNORMAL
KAPPA LC FREE SER-MCNC: 46 MG/L (ref 3.3–19.4)
KAPPA LC FREE/LAMBDA FREE SER: 2.99 {RATIO} (ref 0.26–1.65)
LABORATORY COMMENT REPORT: ABNORMAL
LAMBDA LC FREE SERPL-MCNC: 15.4 MG/L (ref 5.7–26.3)
M PROTEIN SERPL ELPH-MCNC: 1.2 G/DL
PROT SERPL-MCNC: 8.2 G/DL (ref 6–8.5)

## 2023-01-20 PROCEDURE — 77075 RADEX OSSEOUS SURVEY COMPL: CPT

## 2023-01-20 PROCEDURE — 77080 DXA BONE DENSITY AXIAL: CPT

## 2023-01-21 ENCOUNTER — HOSPITAL ENCOUNTER (OUTPATIENT)
Dept: MRI IMAGING | Facility: HOSPITAL | Age: 53
Discharge: HOME | End: 2023-01-21
Admitting: INTERNAL MEDICINE
Payer: COMMERCIAL

## 2023-01-21 PROCEDURE — A9577 INJ MULTIHANCE: HCPCS | Performed by: INTERNAL MEDICINE

## 2023-01-21 PROCEDURE — 72158 MRI LUMBAR SPINE W/O & W/DYE: CPT

## 2023-01-21 PROCEDURE — 0 GADOBENATE DIMEGLUMINE 529 MG/ML SOLUTION: Performed by: INTERNAL MEDICINE

## 2023-01-21 RX ADMIN — GADOBENATE DIMEGLUMINE 18 ML: 529 INJECTION, SOLUTION INTRAVENOUS at 11:52

## 2023-01-27 ENCOUNTER — HOSPITAL ENCOUNTER (OUTPATIENT)
Dept: MRI IMAGING | Facility: HOSPITAL | Age: 53
Discharge: HOME | End: 2023-01-27
Payer: COMMERCIAL

## 2023-01-27 DIAGNOSIS — C90.00 MULTIPLE MYELOMA NOT HAVING ACHIEVED REMISSION: ICD-10-CM

## 2023-01-27 PROCEDURE — A9577 INJ MULTIHANCE: HCPCS | Performed by: INTERNAL MEDICINE

## 2023-01-27 PROCEDURE — 72157 MRI CHEST SPINE W/O & W/DYE: CPT

## 2023-01-27 PROCEDURE — 72156 MRI NECK SPINE W/O & W/DYE: CPT

## 2023-01-27 PROCEDURE — 0 GADOBENATE DIMEGLUMINE 529 MG/ML SOLUTION: Performed by: INTERNAL MEDICINE

## 2023-01-27 RX ADMIN — GADOBENATE DIMEGLUMINE 17 ML: 529 INJECTION, SOLUTION INTRAVENOUS at 08:37

## 2023-02-01 ENCOUNTER — OFFICE VISIT (OUTPATIENT)
Dept: ONCOLOGY | Facility: CLINIC | Age: 53
End: 2023-02-01
Payer: COMMERCIAL

## 2023-02-01 ENCOUNTER — SPECIALTY PHARMACY (OUTPATIENT)
Dept: ONCOLOGY | Facility: HOSPITAL | Age: 53
End: 2023-02-01
Payer: COMMERCIAL

## 2023-02-01 VITALS
RESPIRATION RATE: 18 BRPM | WEIGHT: 191.2 LBS | SYSTOLIC BLOOD PRESSURE: 155 MMHG | BODY MASS INDEX: 31.86 KG/M2 | HEART RATE: 97 BPM | DIASTOLIC BLOOD PRESSURE: 97 MMHG | OXYGEN SATURATION: 98 % | TEMPERATURE: 97.3 F | HEIGHT: 65 IN

## 2023-02-01 DIAGNOSIS — C90.00 MULTIPLE MYELOMA NOT HAVING ACHIEVED REMISSION: Primary | ICD-10-CM

## 2023-02-01 LAB
DX PRELIMINARY: NORMAL
LAB AP CASE REPORT: NORMAL
LAB AP CLINICAL INFORMATION: NORMAL
LAB AP DIAGNOSIS COMMENT: NORMAL
PATH REPORT.ADDENDUM SPEC: NORMAL
PATH REPORT.FINAL DX SPEC: NORMAL
PATH REPORT.GROSS SPEC: NORMAL

## 2023-02-01 PROCEDURE — 99213 OFFICE O/P EST LOW 20 MIN: CPT | Performed by: NURSE PRACTITIONER

## 2023-02-01 RX ORDER — ACYCLOVIR 400 MG/1
400 TABLET ORAL 2 TIMES DAILY
Qty: 60 TABLET | Refills: 3 | Status: SHIPPED | OUTPATIENT
Start: 2023-02-01

## 2023-02-01 RX ORDER — ONDANSETRON HYDROCHLORIDE 8 MG/1
8 TABLET, FILM COATED ORAL 3 TIMES DAILY PRN
Qty: 30 TABLET | Refills: 5 | Status: SHIPPED | OUTPATIENT
Start: 2023-02-01

## 2023-02-01 RX ORDER — DEXAMETHASONE 4 MG/1
20 TABLET ORAL WEEKLY
Qty: 20 TABLET | Refills: 3 | Status: SHIPPED | OUTPATIENT
Start: 2023-02-01

## 2023-02-01 RX ORDER — SULFAMETHOXAZOLE AND TRIMETHOPRIM 800; 160 MG/1; MG/1
1 TABLET ORAL 3 TIMES WEEKLY
Qty: 12 TABLET | Refills: 3 | Status: SHIPPED | OUTPATIENT
Start: 2023-02-01

## 2023-02-01 RX ORDER — LENALIDOMIDE 15 MG/1
15 CAPSULE ORAL DAILY
Qty: 21 CAPSULE | Refills: 0 | Status: SHIPPED | OUTPATIENT
Start: 2023-02-01 | End: 2023-03-07

## 2023-02-01 NOTE — PROGRESS NOTES
TREATMENT  PREPARATION    Yudy Hinton  [id number]  1970    Chief Complaint: Treatment preparation and needs assessment    History of present illness:  Yudy Hinton is a 52 y.o. year old female who is here today for treatment preparation and needs assessment.  The patient has been diagnosed with   Encounter Diagnosis   Name Primary?   • Multiple myeloma not having achieved remission (HCC) Yes    and is scheduled to begin treatment with:     Oncology History:    Oncology/Hematology History   Multiple myeloma not having achieved remission (HCC)   1/18/2023 Initial Diagnosis    Multiple myeloma not having achieved remission (HCC)     2/3/2023 -  Chemotherapy    OP MULTIPLE MYELOMA Daratumumab / Lenalidomide / Bortezomib / Dexamethasone (INDUCTION)          The current medication list and allergy list were reviewed and reconciled.     Past Medical History, Past Surgical History, Social History, Family History have been reviewed and are without significant changes except as mentioned.    Physical Exam:    Vitals:    02/01/23 1112   BP: 155/97   Pulse: 97   Resp: 18   Temp: 97.3 °F (36.3 °C)   SpO2: 98%     Vitals:    02/01/23 1112   PainSc:   6   PainLoc: Leg        ECOG score: 0           Physical Exam  HENT:      Head: Normocephalic and atraumatic.   Eyes:      Extraocular Movements: Extraocular movements intact.      Conjunctiva/sclera: Conjunctivae normal.   Pulmonary:      Effort: Pulmonary effort is normal. No respiratory distress.   Neurological:      General: No focal deficit present.      Mental Status: She is alert and oriented to person, place, and time.   Psychiatric:         Mood and Affect: Mood normal.         Behavior: Behavior normal.         NEEDS ASSESSMENTS    Genetics  The patient's new diagnosis and family history have been reviewed for genetic counseling needs. A genetic referral is not recommended.     Psychosocial and Barriers to care  The patient has completed a PHQ-9 Depression  Screening and the Distress Thermometer (DT) today.  PHQ-9 results show PHQ-2 Total Score: 1 PHQ-9 Total Score: PHQ-9 Total Score: 1     The patient scored their distress today as Distress Level: 6 on a scale of 0-10 with 0 being no distress and 10 being extreme distress. Problems marked by the patient as being an issue for them within the last week include Physical concerns  Fatigue: Yes  Pain: Yes  Sleep: Yes .      Results were reviewed along with psychosocial resources offered by our cancer center.  Our Supportive Oncology team will be flagged for a score of 4 or above, and a same day call will be made for a score of 9 or 10.  A mental health referral is offered at that time. Patients who score less than 4 have been educated on our support services and can be referred to our  upon request.  The patient will be referred to our .       Nutrition  The patient has completed the malnutrition screening today. They scored Malnutrition Screening Tool  Have you recently lost weight without trying?  If yes, how much weight have you lost?: 0--> No  Have you been eating poorly because of a decreased appetite?: 0--> No  MST score: 0   with a score of 0-1 meaning not at risk in a score of 2 or greater meaning at risk.  Patients with a score of 3 or higher will be referred to our oncology dietitian for support. Patients beginning at risk treatment regimens or who have dietary concerns will also be referred to our oncology dietitian. NOT APPLICABLE    Functional Assessment  Persons who are age 70 or greater will be screened for qualification of a comprehensive geriatric assessment by our survivorship nurse practitioner.  Older adults with cancer face unique challenges. These may include an increased risk of drug reactions, financial burdens, and caregiver stress. The patient scored   . Patients scoring 14 or lower will referred for an older adult functional assessment with the survivorship advanced  "practice registered nurse to ensure all needed support is provided as patients plan for their treatments. NOT APPLICABLE    Intravenous Access Assessment  The patient and I discussed planned intravenous chemo/biotherapy as well as other IV treatments that are often needed throughout the course of treatment. These may include, but are not limited to blood transfusions, antibiotics, and IV hydration. Discussed that depending on selected treatment and vein assessment, patient may require venous access device (VAD) which could include but not limited to a Mediport or PICC line. Risks and benefits of VADs reviewed. The patient will be treated via PIV.    Reproductive/Sexual Activity   People should avoid becoming pregnant and should not get a partner pregnant while undergoing chemo/biotherapy.  People of childbearing age should use effective contraception during active therapy. The best recommendation for all people is to use a barrier method for a minimum of 1 week after the last infusion of chemo/biotherapy to prevent your partner being exposed to byproducts from treatment medications in bodily fluids. Effective contraception should be discussed with your oncology team to make sure it is safe to take based on your diagnosis. Possible options include oral contraceptives, barrier methods. Chemo/biotherapy can change your ability to reproduce children in the future.  There are options for fertility preservation. NOT APPLICABLE    Advanced Care Planning  Advance Care Planning   The patient and I discussed advanced care planning, \"Conversations that Matter\".   This service is offered for development of advance directives with a certified ACP facilitator.  The patient does not have an up-to-date advanced directive. This document is not on file with our office. The patient is not interested in an appointment with one of our facilitators to create or update their advanced directives.     Smoking cessation  Tobacco Use: Low " Risk    • Smoking Tobacco Use: Never   • Smokeless Tobacco Use: Never   • Passive Exposure: Not on file       Patient and I discussed their tobacco use history. Referral will not be made for smoking cessation.      Palliative Care  When appropriate, the patient and I discussed the availability palliative care services and when appropriate Hospice care. Palliative care is not the same as Hospice care which was explained to the patient.  The patient is not interested in additional information from our  on these services.     Survivorship   When appropriate, we discussed that we will refer the patient to survivorship clinic to discuss next steps following completion of planned treatment.  Reviewed this visit will include assessment of your physical, psychological, functional, and spiritual needs as a survivor and the need at attend this visit when scheduled.    TREATMENT EDUCATION    Today I met with the patient to discuss the chemo/biotherapy regimen recommended for treatment of Multiple myeloma not having achieved remission (HCC)  .  The patient was given explanation of treatment premed side effects including office policy that prohibits patients to drive if sedating medications are administered, MD explanation given regarding benefits, side effects, toxicities and goals of treatment.  The patient received a Chemotherapy/Biotherapy Plan Summary including diagnosis and explanation of specific treatment plan.    SIDE EFFECTS:  Common side effects were discussed with the patient and/or significant other.  Discussion included where applicable hair loss/discoloration, anemia/fatigue, infection/chills/fever, appetite, bleeding risk/precautions, constipation, diarrhea, mouth sores, taste alteration, loss of appetite, nausea/vomiting, peripheral neuropathy, skin/nail changes, rash, muscle aches/weakness, photosensitivity, weight gain/loss, hearing loss, dizziness, menopausal symptoms, menstrual irregularity,  sterility, high blood pressure, heart damage, liver damage, lung damage, kidney damage, DVT/PE risk, fluid retention, pleural/pericardial effusion, somnolence, electrolyte/LFT imbalance, vein exercises and/or the possible need for vascular access/port placement.  The patient was advised that although uncommon, leakage of an infused medication from the vein or venous access device may lead to skin breakdown and/or other tissue damage.  The patient was advised that he/she may have pain, bleeding, and/or bruising from the insertion of a needle in their vein or venous access device (port).  The patient was further advised that, in spite of proper technique, infection with redness and irritation may rarely occur at the site where the needle was inserted.  The patient was advised that if complications occur, additional medical treatment is available.  Finally, where applicable we have reviewed rare but potential immune mediated side effects including shortness of breath, cough, chest pain (pneumonitis), abdominal pain, diarrhea (colitis), thyroiditis (hypothyroid or hyperthyroid), hepatitis and liver dysfunction, nephritis and renal dysfunction.    Discussion also included side effects specific to drugs in the treatment plan, specifically:    Treatment Plans     Name Type Plan Dates Plan Provider         Active    OP MULTIPLE MYELOMA Daratumumab / Lenalidomide / Bortezomib / Dexamethasone (INDUCTION)  ONCOLOGY TREATMENT  1/26/2023 - Present Estuardo Freedman MD                    Questions answered and additional information discussed on topics including:  Anemia, Thrombocytopenia, Neutropenia, Nutrition and appetite changes, Constipation, Diarrhea, Nausea & vomiting, Mouth sores, Nervous system changes, Skin & nail changes, Organ toxicities and Home care       Assessment and Plan:    Diagnoses and all orders for this visit:    1. Multiple myeloma not having achieved remission (HCC) (Primary)      No orders of the defined  types were placed in this encounter.        1. The patient and I have reviewed their diagnosis and scheduled treatment plan. Needs assessment was completed where applicable including genetics, psychosocial needs, barriers to care, VAD evaluation, advanced care planning, survivorship, and palliative care services where indicated. Referrals have been ordered as appropriate based upon evaluation today and patient desires.   2. Chemo/biotherapy teaching was completed today and consent obtained. See separate documentation for further details.  3. Adequate time was given to answer questions.  Patient made aware of their care team members and contact information if they have questions or problems throughout the treatment course.  4. Discussion held and written information provided describing frequency of office visits and ongoing monitoring throughout the treatment plan.     5. Reviewed with patient any prescribed medication sent to pharmacy.  Education provided regarding proper storage, safe handling, and proper disposal of unused medication.  6. Proper handling of body fluids and waste discussed and written information provided.  7. If appropriate, patient had pretreatment labs drawn today.    Learning assessment completed at initial patient encounter. See separate flowsheet. Chemo/biotherapy education comprehension assessed at today's visit.    I spent 25 minutes caring for Yudy on this date of service. This time includes time spent by me in the following activities: preparing for the visit, reviewing tests, obtaining and/or reviewing a separately obtained history, counseling and educating the patient/family/caregiver, ordering medications, tests, or procedures, documenting information in the medical record and care coordination.     Emelia Nixon, APRN   02/01/23

## 2023-02-01 NOTE — PROGRESS NOTES
[facility] Hematology/Oncology Treatment Plan Summary    Name: Yudy Hinton  Acct# [id number]  MD: Dr. Freedman    Diagnosis: Multiple Myeloma    Goal of chemotherapy: induction    Treatment Medication(s) / Frequency and Dosing    1. Darzalex Faspro 1800 mg SQ once weekly  2. Velcade 1.3 mg/m2 SQ once weekly for 3 weeks on then 1 week off  3. Revlimid 15 mg capsule po daily 21/28 days  4. Dexamethasone 20 mg po once weekly     Number of cycles: TBD per MD    Starting on: 2/3/23     Follow-up Testing to be determined after TBD cycles by MD.     Items for home use: Senokot-S (for constipation), Imodium AD (for diarrhea) and Acetaminophen or Tylenol (for fever and/or pain)    Rx written for: [] Nausea    [] Pre-Chemo   acyclovir 400 mg by mouth twice daily , Bactrim DS 1 tab by mouth on Mondays, Wednesdays, and Fridays and ondansetron 8 mg by mouth every 8 hours as needed for nausea  xarelto 2.5 mg po daily and dexamethasone 4 mg tablets- take 5 tablets (20 mg) once weekly    Completing Pharmacist: Marianna Mays RPH             Date/time: 02/01/2023 12:34 EST    Please note: You will be seen by a provider frequently with your treatment plan. This plan may change depending on many factors, if so, this will be discussed with you by your physician.  Last update 12/2020.       Counseling Provided:  - Side effects reviewed with patient including: decreased WBC/increased risk for infection , decreased platelets/increased risk for bleed, decreased hemoglobin, fatigue, nausea/vomiting, diarrhea, constipation, skin rash/itchy skin, muscle/joint pain, changes in liver function, rare risk of secondary malignancy, peripheral neuropathy and REMS program associated with Revlimid, rare risk of blood clots. Additional side effects covered in written handout.   - Reviewed proper administration: Discussed if the patient is handling their own medications, then they need to wash their hands properly after touching the medication.  If a caregiver is assisting with handling medication, need to wear disposal gloves and wash hands properly afterwards. Patient was counseled to take Revlimid with or without food, at the same time each day. Take dexamethasone in the morning with food. Additional precautions: none  - Provided information on prior storage of medication: Store medication safe away from children and pets, away from light, and at room temperature. If you use a pill box, use a separate pill box from other medication.   - Provided information on safe handling of soiled linen and proper flush technique and reviewed proper disposal of medication.   - Reviewed what to do in the event of a missed dose: Do not take an extra dose or two doses at one time. Simply take your next dose at the regularly scheduled time.  - Reviewed expected goals/outcomes, contraindications and safety precautions, including when to seek medical care.  - Provided information on pregnancy considerations - women should not become pregnant and men should not get a partner pregnant while taking; men and women of childbearing age and potential should use effective contraception during and after therapy.    Provided patient with:   Chemo calendar to help improve adherence., Education sheets about the medication, 24-hour clinic phone number and my contact information and instructions to call should additional questions arise.     Completed medication reconciliation today to assess for drug interactions.   Reviewed concomitant medications, allergies, labs, comorbidities/medical history, and immunization history.   Drug-drug interactions noted: no significant drug interactions noted.   Advised pt to call the clinic if any new medications are started so we can assess for drug-drug interactions     Wrap-up:  Discussed aforementioned material with patient in person, face-to-face, in clinic.   Chemo consents/CCA were signed at today's visit.   Medication availability: patient is  aware [facility] will be contacting them to arrange delivery  Patient and family, present in today's appointment, expressed understanding.  Patient demonstrates ability to self-administer medication. No barriers to adherence identified.  All questions and concerns addressed.     Marianna Mays RPH  2/1/2023  12:34 EST

## 2023-02-03 ENCOUNTER — INFUSION (OUTPATIENT)
Dept: ONCOLOGY | Facility: HOSPITAL | Age: 53
End: 2023-02-03
Payer: COMMERCIAL

## 2023-02-03 VITALS — RESPIRATION RATE: 16 BRPM | SYSTOLIC BLOOD PRESSURE: 129 MMHG | DIASTOLIC BLOOD PRESSURE: 85 MMHG | HEART RATE: 100 BPM

## 2023-02-03 DIAGNOSIS — C90.00 MULTIPLE MYELOMA NOT HAVING ACHIEVED REMISSION: Primary | ICD-10-CM

## 2023-02-03 LAB
ABO GROUP BLD: NORMAL
ALBUMIN SERPL-MCNC: 4.7 G/DL (ref 3.5–5.2)
ALBUMIN/GLOB SERPL: 1 G/DL (ref 1.1–2.4)
ALP SERPL-CCNC: 84 U/L (ref 38–116)
ALT SERPL W P-5'-P-CCNC: 24 U/L (ref 0–33)
ANION GAP SERPL CALCULATED.3IONS-SCNC: 15.7 MMOL/L (ref 5–15)
AST SERPL-CCNC: 31 U/L (ref 0–32)
B-HCG UR QL: NEGATIVE
BASOPHILS # BLD AUTO: 0.03 10*3/MM3 (ref 0–0.2)
BASOPHILS NFR BLD AUTO: 0.3 % (ref 0–1.5)
BILIRUB SERPL-MCNC: 0.2 MG/DL (ref 0.2–1.2)
BLD GP AB SCN SERPL QL: NEGATIVE
BUN SERPL-MCNC: 13 MG/DL (ref 6–20)
BUN/CREAT SERPL: 14.1 (ref 7.3–30)
CALCIUM SPEC-SCNC: 9.8 MG/DL (ref 8.5–10.2)
CHLORIDE SERPL-SCNC: 100 MMOL/L (ref 98–107)
CO2 SERPL-SCNC: 20.3 MMOL/L (ref 22–29)
CREAT SERPL-MCNC: 0.92 MG/DL (ref 0.6–1.1)
DEPRECATED RDW RBC AUTO: 44.2 FL (ref 37–54)
EGFRCR SERPLBLD CKD-EPI 2021: 76 ML/MIN/1.73
EOSINOPHIL # BLD AUTO: 0.01 10*3/MM3 (ref 0–0.4)
EOSINOPHIL NFR BLD AUTO: 0.1 % (ref 0.3–6.2)
ERYTHROCYTE [DISTWIDTH] IN BLOOD BY AUTOMATED COUNT: 13.9 % (ref 12.3–15.4)
GLOBULIN UR ELPH-MCNC: 4.6 GM/DL (ref 1.8–3.5)
GLUCOSE SERPL-MCNC: 142 MG/DL (ref 74–124)
HCT VFR BLD AUTO: 40.4 % (ref 34–46.6)
HGB BLD-MCNC: 13.3 G/DL (ref 12–15.9)
IMM GRANULOCYTES # BLD AUTO: 0.12 10*3/MM3 (ref 0–0.05)
IMM GRANULOCYTES NFR BLD AUTO: 1 % (ref 0–0.5)
KELL: NEGATIVE
LYMPHOCYTES # BLD AUTO: 1.09 10*3/MM3 (ref 0.7–3.1)
LYMPHOCYTES NFR BLD AUTO: 9.5 % (ref 19.6–45.3)
MCH RBC QN AUTO: 28.9 PG (ref 26.6–33)
MCHC RBC AUTO-ENTMCNC: 32.9 G/DL (ref 31.5–35.7)
MCV RBC AUTO: 87.6 FL (ref 79–97)
MONOCYTES # BLD AUTO: 0.12 10*3/MM3 (ref 0.1–0.9)
MONOCYTES NFR BLD AUTO: 1 % (ref 5–12)
NEUTROPHILS NFR BLD AUTO: 10.06 10*3/MM3 (ref 1.7–7)
NEUTROPHILS NFR BLD AUTO: 88.1 % (ref 42.7–76)
NRBC BLD AUTO-RTO: 0 /100 WBC (ref 0–0.2)
PLATELET # BLD AUTO: 541 10*3/MM3 (ref 140–450)
PMV BLD AUTO: 9.4 FL (ref 6–12)
POTASSIUM SERPL-SCNC: 3.8 MMOL/L (ref 3.5–4.7)
PROT SERPL-MCNC: 9.3 G/DL (ref 6.3–8)
RBC # BLD AUTO: 4.61 10*6/MM3 (ref 3.77–5.28)
RH BLD: POSITIVE
SODIUM SERPL-SCNC: 136 MMOL/L (ref 134–145)
T&S EXPIRATION DATE: NORMAL
WBC NRBC COR # BLD: 11.43 10*3/MM3 (ref 3.4–10.8)

## 2023-02-03 PROCEDURE — 86850 RBC ANTIBODY SCREEN: CPT | Performed by: INTERNAL MEDICINE

## 2023-02-03 PROCEDURE — 81025 URINE PREGNANCY TEST: CPT | Performed by: INTERNAL MEDICINE

## 2023-02-03 PROCEDURE — 25010000002 DARATUMUMAB-HYALURONIDASE-FIHJ 1800-30000 MG-UT/15ML SOLUTION: Performed by: INTERNAL MEDICINE

## 2023-02-03 PROCEDURE — 96401 CHEMO ANTI-NEOPL SQ/IM: CPT

## 2023-02-03 PROCEDURE — 25010000002 METHYLPREDNISOLONE PER 125 MG: Performed by: INTERNAL MEDICINE

## 2023-02-03 PROCEDURE — 25010000002 BORTEZOMIB PER 0.1 MG: Performed by: INTERNAL MEDICINE

## 2023-02-03 PROCEDURE — 86900 BLOOD TYPING SEROLOGIC ABO: CPT | Performed by: INTERNAL MEDICINE

## 2023-02-03 PROCEDURE — 80053 COMPREHEN METABOLIC PANEL: CPT

## 2023-02-03 PROCEDURE — 25010000002 DIPHENHYDRAMINE PER 50 MG: Performed by: INTERNAL MEDICINE

## 2023-02-03 PROCEDURE — 85025 COMPLETE CBC W/AUTO DIFF WBC: CPT

## 2023-02-03 PROCEDURE — 86901 BLOOD TYPING SEROLOGIC RH(D): CPT | Performed by: INTERNAL MEDICINE

## 2023-02-03 PROCEDURE — 96375 TX/PRO/DX INJ NEW DRUG ADDON: CPT

## 2023-02-03 PROCEDURE — 86905 BLOOD TYPING RBC ANTIGENS: CPT | Performed by: INTERNAL MEDICINE

## 2023-02-03 RX ORDER — SODIUM CHLORIDE 9 MG/ML
250 INJECTION, SOLUTION INTRAVENOUS ONCE
Status: CANCELLED | OUTPATIENT
Start: 2023-02-24

## 2023-02-03 RX ORDER — ACETAMINOPHEN 500 MG
1000 TABLET ORAL ONCE
Status: CANCELLED | OUTPATIENT
Start: 2023-02-10

## 2023-02-03 RX ORDER — BORTEZOMIB 3.5 MG/1
1.3 INJECTION, POWDER, LYOPHILIZED, FOR SOLUTION INTRAVENOUS; SUBCUTANEOUS ONCE
Status: CANCELLED | OUTPATIENT
Start: 2023-02-10

## 2023-02-03 RX ORDER — ACETAMINOPHEN 500 MG
1000 TABLET ORAL ONCE
Status: COMPLETED | OUTPATIENT
Start: 2023-02-03 | End: 2023-02-03

## 2023-02-03 RX ORDER — ACETAMINOPHEN 500 MG
1000 TABLET ORAL ONCE
Status: CANCELLED | OUTPATIENT
Start: 2023-02-24

## 2023-02-03 RX ORDER — DIPHENHYDRAMINE HYDROCHLORIDE 50 MG/ML
50 INJECTION INTRAMUSCULAR; INTRAVENOUS AS NEEDED
Status: CANCELLED | OUTPATIENT
Start: 2023-02-17

## 2023-02-03 RX ORDER — MEPERIDINE HYDROCHLORIDE 25 MG/ML
25 INJECTION INTRAMUSCULAR; INTRAVENOUS; SUBCUTANEOUS
Status: CANCELLED | OUTPATIENT
Start: 2023-02-17

## 2023-02-03 RX ORDER — SODIUM CHLORIDE 9 MG/ML
250 INJECTION, SOLUTION INTRAVENOUS ONCE
Status: COMPLETED | OUTPATIENT
Start: 2023-02-03 | End: 2023-02-03

## 2023-02-03 RX ORDER — FAMOTIDINE 10 MG/ML
20 INJECTION, SOLUTION INTRAVENOUS AS NEEDED
Status: CANCELLED | OUTPATIENT
Start: 2023-02-03

## 2023-02-03 RX ORDER — FAMOTIDINE 10 MG/ML
20 INJECTION, SOLUTION INTRAVENOUS AS NEEDED
Status: CANCELLED | OUTPATIENT
Start: 2023-02-10

## 2023-02-03 RX ORDER — MEPERIDINE HYDROCHLORIDE 25 MG/ML
25 INJECTION INTRAMUSCULAR; INTRAVENOUS; SUBCUTANEOUS
Status: CANCELLED | OUTPATIENT
Start: 2023-02-03

## 2023-02-03 RX ORDER — SODIUM CHLORIDE 9 MG/ML
250 INJECTION, SOLUTION INTRAVENOUS ONCE
Status: CANCELLED | OUTPATIENT
Start: 2023-02-10

## 2023-02-03 RX ORDER — MEPERIDINE HYDROCHLORIDE 25 MG/ML
25 INJECTION INTRAMUSCULAR; INTRAVENOUS; SUBCUTANEOUS
Status: CANCELLED | OUTPATIENT
Start: 2023-02-10

## 2023-02-03 RX ORDER — ACETAMINOPHEN 500 MG
1000 TABLET ORAL ONCE
Status: CANCELLED | OUTPATIENT
Start: 2023-02-17

## 2023-02-03 RX ORDER — FAMOTIDINE 10 MG/ML
20 INJECTION, SOLUTION INTRAVENOUS ONCE
Status: CANCELLED | OUTPATIENT
Start: 2023-02-10

## 2023-02-03 RX ORDER — MONTELUKAST SODIUM 10 MG/1
10 TABLET ORAL ONCE
Status: COMPLETED | OUTPATIENT
Start: 2023-02-03 | End: 2023-02-03

## 2023-02-03 RX ORDER — FAMOTIDINE 20 MG/1
20 TABLET, FILM COATED ORAL
Status: CANCELLED
Start: 2023-02-17

## 2023-02-03 RX ORDER — DIPHENHYDRAMINE HYDROCHLORIDE 50 MG/ML
50 INJECTION INTRAMUSCULAR; INTRAVENOUS AS NEEDED
Status: CANCELLED | OUTPATIENT
Start: 2023-02-24

## 2023-02-03 RX ORDER — MEPERIDINE HYDROCHLORIDE 25 MG/ML
25 INJECTION INTRAMUSCULAR; INTRAVENOUS; SUBCUTANEOUS
Status: CANCELLED | OUTPATIENT
Start: 2023-02-24

## 2023-02-03 RX ORDER — FAMOTIDINE 10 MG/ML
20 INJECTION, SOLUTION INTRAVENOUS AS NEEDED
Status: CANCELLED | OUTPATIENT
Start: 2023-02-24

## 2023-02-03 RX ORDER — FAMOTIDINE 10 MG/ML
20 INJECTION, SOLUTION INTRAVENOUS ONCE
Status: COMPLETED | OUTPATIENT
Start: 2023-02-03 | End: 2023-02-03

## 2023-02-03 RX ORDER — METHYLPREDNISOLONE SODIUM SUCCINATE 125 MG/2ML
100 INJECTION, POWDER, LYOPHILIZED, FOR SOLUTION INTRAMUSCULAR; INTRAVENOUS ONCE
Status: COMPLETED | OUTPATIENT
Start: 2023-02-03 | End: 2023-02-03

## 2023-02-03 RX ORDER — SODIUM CHLORIDE 9 MG/ML
250 INJECTION, SOLUTION INTRAVENOUS ONCE
Status: CANCELLED | OUTPATIENT
Start: 2023-02-17

## 2023-02-03 RX ORDER — BORTEZOMIB 3.5 MG/1
1.3 INJECTION, POWDER, LYOPHILIZED, FOR SOLUTION INTRAVENOUS; SUBCUTANEOUS ONCE
Status: CANCELLED | OUTPATIENT
Start: 2023-02-03

## 2023-02-03 RX ORDER — FAMOTIDINE 10 MG/ML
20 INJECTION, SOLUTION INTRAVENOUS AS NEEDED
Status: CANCELLED | OUTPATIENT
Start: 2023-02-17

## 2023-02-03 RX ORDER — FAMOTIDINE 20 MG/1
20 TABLET, FILM COATED ORAL
Status: CANCELLED
Start: 2023-02-24

## 2023-02-03 RX ORDER — METHYLPREDNISOLONE SODIUM SUCCINATE 125 MG/2ML
100 INJECTION, POWDER, LYOPHILIZED, FOR SOLUTION INTRAMUSCULAR; INTRAVENOUS ONCE
Status: CANCELLED | OUTPATIENT
Start: 2023-02-10

## 2023-02-03 RX ORDER — DIPHENHYDRAMINE HYDROCHLORIDE 50 MG/ML
50 INJECTION INTRAMUSCULAR; INTRAVENOUS AS NEEDED
Status: CANCELLED | OUTPATIENT
Start: 2023-02-03

## 2023-02-03 RX ORDER — DIPHENHYDRAMINE HYDROCHLORIDE 50 MG/ML
50 INJECTION INTRAMUSCULAR; INTRAVENOUS AS NEEDED
Status: CANCELLED | OUTPATIENT
Start: 2023-02-10

## 2023-02-03 RX ORDER — DIPHENHYDRAMINE HCL 25 MG
25 CAPSULE ORAL ONCE
Status: CANCELLED | OUTPATIENT
Start: 2023-02-17

## 2023-02-03 RX ORDER — BORTEZOMIB 3.5 MG/1
1.3 INJECTION, POWDER, LYOPHILIZED, FOR SOLUTION INTRAVENOUS; SUBCUTANEOUS ONCE
Status: COMPLETED | OUTPATIENT
Start: 2023-02-03 | End: 2023-02-03

## 2023-02-03 RX ORDER — DIPHENHYDRAMINE HCL 25 MG
25 CAPSULE ORAL ONCE
Status: CANCELLED | OUTPATIENT
Start: 2023-02-24

## 2023-02-03 RX ORDER — BORTEZOMIB 3.5 MG/1
1.3 INJECTION, POWDER, LYOPHILIZED, FOR SOLUTION INTRAVENOUS; SUBCUTANEOUS ONCE
Status: CANCELLED | OUTPATIENT
Start: 2023-02-17

## 2023-02-03 RX ADMIN — SODIUM CHLORIDE 250 ML: 9 INJECTION, SOLUTION INTRAVENOUS at 13:58

## 2023-02-03 RX ADMIN — BORTEZOMIB 2.5 MG: 3.5 INJECTION, POWDER, LYOPHILIZED, FOR SOLUTION INTRAVENOUS; SUBCUTANEOUS at 14:52

## 2023-02-03 RX ADMIN — DIPHENHYDRAMINE HYDROCHLORIDE 50 MG: 50 INJECTION, SOLUTION INTRAMUSCULAR; INTRAVENOUS at 14:01

## 2023-02-03 RX ADMIN — MONTELUKAST 10 MG: 10 TABLET, FILM COATED ORAL at 13:58

## 2023-02-03 RX ADMIN — METHYLPREDNISOLONE SODIUM SUCCINATE 100 MG: 125 INJECTION, POWDER, FOR SOLUTION INTRAMUSCULAR; INTRAVENOUS at 14:00

## 2023-02-03 RX ADMIN — DARATUMUMAB AND HYALURONIDASE-FIHJ (HUMAN RECOMBINANT) 1800 MG: 1800; 30000 INJECTION SUBCUTANEOUS at 14:52

## 2023-02-03 RX ADMIN — FAMOTIDINE 20 MG: 10 INJECTION INTRAVENOUS at 13:58

## 2023-02-03 RX ADMIN — ACETAMINOPHEN 1000 MG: 500 TABLET, FILM COATED ORAL at 13:58

## 2023-02-07 ENCOUNTER — TELEPHONE (OUTPATIENT)
Dept: OTHER | Facility: HOSPITAL | Age: 53
End: 2023-02-07
Payer: COMMERCIAL

## 2023-02-07 NOTE — TELEPHONE ENCOUNTER
OSW called patient regarding the OSW referral and offered some support. Patient was at work during the phone call but expressed feeling very overwhelmed since her dx.     Patient reported having family support however, family is not sure how best to support her. Patient has children who are afraid for her health / diagnosis.     Patient also reported working overtime at her current job and the stress that has placed on her. Patient is seeking overall support and interested in available resources for herself and family.     OSW and patient have an appt scheduled for Friday 2/10 at 11:30am.     OSW will continue to monitor and assist when needed.

## 2023-02-10 ENCOUNTER — OFFICE VISIT (OUTPATIENT)
Dept: ONCOLOGY | Facility: CLINIC | Age: 53
End: 2023-02-10
Payer: COMMERCIAL

## 2023-02-10 ENCOUNTER — DOCUMENTATION (OUTPATIENT)
Dept: PHARMACY | Facility: HOSPITAL | Age: 53
End: 2023-02-10
Payer: COMMERCIAL

## 2023-02-10 ENCOUNTER — INFUSION (OUTPATIENT)
Dept: ONCOLOGY | Facility: HOSPITAL | Age: 53
End: 2023-02-10
Payer: COMMERCIAL

## 2023-02-10 ENCOUNTER — DOCUMENTATION (OUTPATIENT)
Dept: OTHER | Facility: HOSPITAL | Age: 53
End: 2023-02-10
Payer: COMMERCIAL

## 2023-02-10 VITALS
DIASTOLIC BLOOD PRESSURE: 84 MMHG | HEIGHT: 65 IN | TEMPERATURE: 97.1 F | WEIGHT: 193.8 LBS | SYSTOLIC BLOOD PRESSURE: 141 MMHG | HEART RATE: 81 BPM | BODY MASS INDEX: 32.29 KG/M2 | OXYGEN SATURATION: 98 % | RESPIRATION RATE: 18 BRPM

## 2023-02-10 DIAGNOSIS — C90.00 MULTIPLE MYELOMA NOT HAVING ACHIEVED REMISSION: ICD-10-CM

## 2023-02-10 DIAGNOSIS — C90.00 MULTIPLE MYELOMA NOT HAVING ACHIEVED REMISSION: Primary | ICD-10-CM

## 2023-02-10 LAB
ALBUMIN SERPL-MCNC: 4.3 G/DL (ref 3.5–5.2)
ALBUMIN/GLOB SERPL: 1.1 G/DL (ref 1.1–2.4)
ALP SERPL-CCNC: 84 U/L (ref 38–116)
ALT SERPL W P-5'-P-CCNC: 20 U/L (ref 0–33)
ANION GAP SERPL CALCULATED.3IONS-SCNC: 11.7 MMOL/L (ref 5–15)
AST SERPL-CCNC: 21 U/L (ref 0–32)
BASOPHILS # BLD AUTO: 0.03 10*3/MM3 (ref 0–0.2)
BASOPHILS NFR BLD AUTO: 0.3 % (ref 0–1.5)
BILIRUB SERPL-MCNC: <0.2 MG/DL (ref 0.2–1.2)
BUN SERPL-MCNC: 15 MG/DL (ref 6–20)
BUN/CREAT SERPL: 20.8 (ref 7.3–30)
CALCIUM SPEC-SCNC: 9.3 MG/DL (ref 8.5–10.2)
CHLORIDE SERPL-SCNC: 103 MMOL/L (ref 98–107)
CO2 SERPL-SCNC: 20.3 MMOL/L (ref 22–29)
CREAT SERPL-MCNC: 0.72 MG/DL (ref 0.6–1.1)
DEPRECATED RDW RBC AUTO: 45.4 FL (ref 37–54)
EGFRCR SERPLBLD CKD-EPI 2021: 102 ML/MIN/1.73
EOSINOPHIL # BLD AUTO: 0.16 10*3/MM3 (ref 0–0.4)
EOSINOPHIL NFR BLD AUTO: 1.5 % (ref 0.3–6.2)
ERYTHROCYTE [DISTWIDTH] IN BLOOD BY AUTOMATED COUNT: 14 % (ref 12.3–15.4)
GLOBULIN UR ELPH-MCNC: 3.9 GM/DL (ref 1.8–3.5)
GLUCOSE SERPL-MCNC: 110 MG/DL (ref 74–124)
HCT VFR BLD AUTO: 38.2 % (ref 34–46.6)
HGB BLD-MCNC: 12.6 G/DL (ref 12–15.9)
IMM GRANULOCYTES # BLD AUTO: 0.06 10*3/MM3 (ref 0–0.05)
IMM GRANULOCYTES NFR BLD AUTO: 0.6 % (ref 0–0.5)
LYMPHOCYTES # BLD AUTO: 1.52 10*3/MM3 (ref 0.7–3.1)
LYMPHOCYTES NFR BLD AUTO: 14.1 % (ref 19.6–45.3)
MCH RBC QN AUTO: 29 PG (ref 26.6–33)
MCHC RBC AUTO-ENTMCNC: 33 G/DL (ref 31.5–35.7)
MCV RBC AUTO: 87.8 FL (ref 79–97)
MONOCYTES # BLD AUTO: 0.6 10*3/MM3 (ref 0.1–0.9)
MONOCYTES NFR BLD AUTO: 5.6 % (ref 5–12)
NEUTROPHILS NFR BLD AUTO: 77.9 % (ref 42.7–76)
NEUTROPHILS NFR BLD AUTO: 8.42 10*3/MM3 (ref 1.7–7)
NRBC BLD AUTO-RTO: 0 /100 WBC (ref 0–0.2)
PLATELET # BLD AUTO: 435 10*3/MM3 (ref 140–450)
PMV BLD AUTO: 9.2 FL (ref 6–12)
POTASSIUM SERPL-SCNC: 4.2 MMOL/L (ref 3.5–4.7)
PROT SERPL-MCNC: 8.2 G/DL (ref 6.3–8)
RBC # BLD AUTO: 4.35 10*6/MM3 (ref 3.77–5.28)
SODIUM SERPL-SCNC: 135 MMOL/L (ref 134–145)
WBC NRBC COR # BLD: 10.79 10*3/MM3 (ref 3.4–10.8)

## 2023-02-10 PROCEDURE — 96401 CHEMO ANTI-NEOPL SQ/IM: CPT

## 2023-02-10 PROCEDURE — 99214 OFFICE O/P EST MOD 30 MIN: CPT | Performed by: INTERNAL MEDICINE

## 2023-02-10 PROCEDURE — 96375 TX/PRO/DX INJ NEW DRUG ADDON: CPT

## 2023-02-10 PROCEDURE — 85025 COMPLETE CBC W/AUTO DIFF WBC: CPT

## 2023-02-10 PROCEDURE — 80053 COMPREHEN METABOLIC PANEL: CPT

## 2023-02-10 PROCEDURE — 25010000002 DARATUMUMAB-HYALURONIDASE-FIHJ 1800-30000 MG-UT/15ML SOLUTION: Performed by: INTERNAL MEDICINE

## 2023-02-10 PROCEDURE — 25010000002 BORTEZOMIB PER 0.1 MG: Performed by: INTERNAL MEDICINE

## 2023-02-10 PROCEDURE — 25010000002 DIPHENHYDRAMINE PER 50 MG: Performed by: INTERNAL MEDICINE

## 2023-02-10 PROCEDURE — 25010000002 METHYLPREDNISOLONE PER 125 MG: Performed by: INTERNAL MEDICINE

## 2023-02-10 RX ORDER — SODIUM CHLORIDE 9 MG/ML
250 INJECTION, SOLUTION INTRAVENOUS ONCE
Status: COMPLETED | OUTPATIENT
Start: 2023-02-10 | End: 2023-02-10

## 2023-02-10 RX ORDER — BORTEZOMIB 3.5 MG/1
1.3 INJECTION, POWDER, LYOPHILIZED, FOR SOLUTION INTRAVENOUS; SUBCUTANEOUS ONCE
Status: COMPLETED | OUTPATIENT
Start: 2023-02-10 | End: 2023-02-10

## 2023-02-10 RX ORDER — ACETAMINOPHEN 500 MG
1000 TABLET ORAL ONCE
Status: COMPLETED | OUTPATIENT
Start: 2023-02-10 | End: 2023-02-10

## 2023-02-10 RX ORDER — FAMOTIDINE 10 MG/ML
20 INJECTION, SOLUTION INTRAVENOUS ONCE
Status: COMPLETED | OUTPATIENT
Start: 2023-02-10 | End: 2023-02-10

## 2023-02-10 RX ORDER — METHYLPREDNISOLONE SODIUM SUCCINATE 125 MG/2ML
100 INJECTION, POWDER, LYOPHILIZED, FOR SOLUTION INTRAMUSCULAR; INTRAVENOUS ONCE
Status: COMPLETED | OUTPATIENT
Start: 2023-02-10 | End: 2023-02-10

## 2023-02-10 RX ADMIN — SODIUM CHLORIDE 250 ML: 9 INJECTION, SOLUTION INTRAVENOUS at 10:57

## 2023-02-10 RX ADMIN — DARATUMUMAB AND HYALURONIDASE-FIHJ (HUMAN RECOMBINANT) 1800 MG: 1800; 30000 INJECTION SUBCUTANEOUS at 12:11

## 2023-02-10 RX ADMIN — DIPHENHYDRAMINE HYDROCHLORIDE 50 MG: 50 INJECTION, SOLUTION INTRAMUSCULAR; INTRAVENOUS at 11:07

## 2023-02-10 RX ADMIN — BORTEZOMIB 2.5 MG: 3.5 INJECTION, POWDER, LYOPHILIZED, FOR SOLUTION INTRAVENOUS; SUBCUTANEOUS at 12:11

## 2023-02-10 RX ADMIN — FAMOTIDINE 20 MG: 10 INJECTION INTRAVENOUS at 11:02

## 2023-02-10 RX ADMIN — METHYLPREDNISOLONE SODIUM SUCCINATE 100 MG: 125 INJECTION, POWDER, FOR SOLUTION INTRAMUSCULAR; INTRAVENOUS at 11:04

## 2023-02-10 RX ADMIN — ACETAMINOPHEN 1000 MG: 500 TABLET, FILM COATED ORAL at 11:00

## 2023-02-10 NOTE — PROGRESS NOTES
Distress Level: 6 (2/1/2023 11:00 AM)      Distress Screening Follow-up    Diagnosis: Multiple Myeloma     Location of Distress Screening: Medical Oncology    Distress Level: 6 (2/1/2023 11:00 AM)    Physical Concerns: Patient reported since her last treatment, she has been experiencing fatigue. The fatigue has been hard for her because she is use to being independent and active.     Fatigue: Y  Pain: Y  Sleep: Y    Practical Problems: Patient asked about insurance coverage. She stated she has an additional oncology insurance policy that could help. OSW gave the patient our 's contact information and encouraged her to reach out to him for assistance. She voiced understanding.          Emotional Concerns: Patient did report feeling overwhelmed and not sure what to say to her teenage children. OSW gave patient resources for that conversation and support. OSW will check in with the patient again for her psychosocial needs.         Family Concerns: Patient's father was present with her during treatment. She explained that she does have family support. Her family is not sure how best to support her. OSW explained the [employer] for [employer] program and gave the contact information for the program. Patient expressed interest.        Spiritual Concerns: Patient does not have an spiritual concerns at this time. OSW will monitor and assist when needed.          Interventions: OSW offered the patient emotional support as well as resources. OSW gave the patient information to our  for insurance / bill related concerns. OSW encouraged the patient to connect to the  [employer]. The patient's father commented that he believe she could benefit from a support group. OSW educated them on finding a support group that would work for her. The patient still works full time at the [employer] therefore she is interested in virtual support and OSW explained where to  find virtual support. Patient does not have an transportation needs at this time.  OSW will follow up with the patient to see how she is feeling.

## 2023-02-10 NOTE — PROGRESS NOTES
Subjective         DURING THE VISIT WITH THE PATIENT TODAY , PATIENT HAD FACE MASK, MY MEDICAL ASSISTANT AND I  HAD PROPPER PROTECTIVE EQUIPMENT, AND I DID HAND HYGIENE WITH SOAP AND WATER BEFORE AND AFTER THE VISIT.     REASONS FOR FOLLOWUP:  1. 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 NO ANEMIA, NORMAL CREATININE,NORMAL CALCIUM, NO BONE PAIN NO ADENOPATHIES , NORMAL DIFFERENTIAL IN WBC.  2. LEUKOCYTOSIS AND THROMBOCYTOSIS.  3. GOITER VERY LIKELY HASHIMOTO'S THYROIDITIS.  4. SJOGREN'S SYNDROME.      HISTORY OF PRESENT ILLNESS:    On 02/10/2023 this 52-year-ld  female returns to the office for 2nd infusion of Darzalex in the background of myeloma who has initiated the regimen of Darzalex, Velcade, Revlimid and dexamethasone for the treatment of this condition last week. The patient has not received yet Revlimid in the mail. Besides this she has not had any side effects of the infusion of Darzalex or the shot of Velcade yet. Her appetite remains good, her bowel activity with some minimal constipation, urination remains normal. She has not had any reactions to the medicines. She has not had any nausea, vomiting, cough, sputum production, shortness of breath, fever or infection. No abnormal bleeding. Her pain in her legs remains about the same. The pain in her back remains about the same.             Past Medical History:   Diagnosis Date   • Achilles tendonitis    • Anemia    • Anxiety    • Arthritis     ankles, shoulders   • Asthma     childhood   • Benign essential hypertension    • Cerebral aneurysm    • GERD (gastroesophageal reflux disease)    • Headache    • History of anemia    • History of E. coli septicemia    • Rotator cuff syndrome    • Seasonal allergies         Past Surgical History:   Procedure Laterality Date   • ABSCESS DRAINAGE  2000    Renal abscess   • CEREBRAL ANGIOGRAM  2020   • CRANIOTOMY  2020    with angiogram for aneurysm   • HYSTERECTOMY  2017     partial, both ovaries remain   • ROTATOR CUFF REPAIR Right 2012   • SHOULDER SURGERY          Current Outpatient Medications on File Prior to Visit   Medication Sig Dispense Refill   • acyclovir (ZOVIRAX) 400 MG tablet Take 1 tablet by mouth 2 (Two) Times a Day. 60 tablet 3   • albuterol sulfate  (90 Base) MCG/ACT inhaler Ventolin HFA 90 mcg/actuation aerosol inhaler     • amitriptyline (ELAVIL) 25 MG tablet      • amLODIPine (NORVASC) 5 MG tablet      • dexamethasone (DECADRON) 4 MG tablet Take 5 tablets by mouth 1 (One) Time Per Week. Take in the morning with food. 20 tablet 3   • Emgality 120 MG/ML auto-injector pen INJECT 1 ML SUBCUTANEOUSLY ONCE EVERY MONTH     • hydrOXYzine (ATARAX) 25 MG tablet      • lenalidomide (REVLIMID) 15 MG capsule Take 1 capsule by mouth Daily. Take for 21 days on then 7 days off 21 capsule 0   • Magnesium Oxide 500 MG tablet magnesium oxide 500 mg tablet   TAKE 1 TABLET BY MOUTH NIGHTLY     • metoprolol tartrate (LOPRESSOR) 50 MG tablet Take 50 mg by mouth Every Morning.     • ondansetron (ZOFRAN) 8 MG tablet Take 1 tablet by mouth 3 (Three) Times a Day As Needed for Nausea or Vomiting. 30 tablet 5   • Rimegepant Sulfate (NURTEC) 75 MG tablet dispersible tablet Take 1 tablet by mouth Daily As Needed.     • Rivaroxaban (Xarelto) 2.5 MG tablet Take 1 tablet by mouth Daily. 30 tablet 2   • sulfamethoxazole-trimethoprim (BACTRIM DS,SEPTRA DS) 800-160 MG per tablet Take 1 tablet by mouth 3 (Three) Times a Week. Take 1 tablet on Mondays, Wednesdays and Fridays. 12 tablet 3     No current facility-administered medications on file prior to visit.        ALLERGIES:    Allergies   Allergen Reactions   • Coconut Shortness Of Breath   • Eggs Or Egg-Derived Products Diarrhea        Social History     Socioeconomic History   • Marital status:    • Number of children: 3   Tobacco Use   • Smoking status: Never   • Smokeless tobacco: Never   Substance and Sexual Activity   • Alcohol  "use: Yes   • Drug use: Never        Family History   Problem Relation Age of Onset   • Hypertension Father    • Multiple sclerosis Brother    • Diabetes Brother    • Colon cancer Paternal Aunt    • Heart attack Maternal Grandmother    • Heart disease Maternal Grandmother    • Pancreatic cancer Maternal Grandfather    • Colon cancer Paternal Grandmother    • Heart attack Paternal Grandfather    • Cancer Paternal Grandfather           Objective     Vitals:    02/10/23 1019   BP: 141/84   Pulse: 81   Resp: 18   Temp: 97.1 °F (36.2 °C)   TempSrc: Temporal   SpO2: 98%   Weight: 87.9 kg (193 lb 12.8 oz)   Height: 165.1 cm (65\")   PainSc:   6   PainLoc: Knee  Comment: pain is in both knees     Current Status 2/10/2023   ECOG score 0       Physical Exam                    GENERAL:  Well-developed, Patient  in no acute distress.   SKIN:  Warm, dry ,NO purpura ,no rash.  HEENT:  Pupils were equal and reactive to light and accomodation, conjunctivae noninjected,  normal visual acuity.   NECK:  Supple with good range of motion; no thyromegaly , no JVD or bruits,.No carotid artery pain, no carotid abnormal pulsation   LYMPHATICS:  No cervical, NO supraclavicular, NO axillary, NO inguinal adenopathies.  CARDIAC   normal rate , regular rhythm, without murmur,NO rubs NO S3 NO S4   LUNGS: normal breath sounds bilateral, no wheezing, NO rhonchi, NO crackles ,NO rubs.  VASCULAR VENOUS: no cyanosis, NO collateral circulation, NO varicosities, NO edema, NO palpable cords, NO pain,NO erythema, NO pigmentation of the skin.  ABDOMEN:  Soft, NO pain,no hepatomegaly, no splenomegaly,no masses, no ascites, no collateral circulation,no distention.  EXTREMITIES  AND SPINE:  No clubbing, no cyanosis ,no deformities , no pain .No kyphosis,  no pain in spine, no pain in ribs , no pain in pelvic bone.  NEUROLOGICAL:  Patient was awake, alert, oriented to time, person and place.            RECENT LABS:  Hematology WBC   Date Value Ref Range Status "   02/10/2023 10.79 3.40 - 10.80 10*3/mm3 Final   12/14/2021 12.38 (H) 4.5 - 11.0 10*3/uL Final     RBC   Date Value Ref Range Status   02/10/2023 4.35 3.77 - 5.28 10*6/mm3 Final   12/14/2021 4.24 4.0 - 5.2 10*6/uL Final     Hemoglobin   Date Value Ref Range Status   02/10/2023 12.6 12.0 - 15.9 g/dL Final   12/14/2021 12.2 12.0 - 16.0 g/dL Final     Hematocrit   Date Value Ref Range Status   02/10/2023 38.2 34.0 - 46.6 % Final   12/14/2021 37.3 36.0 - 46.0 % Final     Platelets   Date Value Ref Range Status   02/10/2023 435 140 - 450 10*3/mm3 Final   12/14/2021 495 (H) 140 - 440 10*3/uL Final       CBC:    WBC   Date Value Ref Range Status   02/10/2023 10.79 3.40 - 10.80 10*3/mm3 Final   12/14/2021 12.38 (H) 4.5 - 11.0 10*3/uL Final     RBC   Date Value Ref Range Status   02/10/2023 4.35 3.77 - 5.28 10*6/mm3 Final   12/14/2021 4.24 4.0 - 5.2 10*6/uL Final     Hemoglobin   Date Value Ref Range Status   02/10/2023 12.6 12.0 - 15.9 g/dL Final   12/14/2021 12.2 12.0 - 16.0 g/dL Final     Hematocrit   Date Value Ref Range Status   02/10/2023 38.2 34.0 - 46.6 % Final   12/14/2021 37.3 36.0 - 46.0 % Final     MCV   Date Value Ref Range Status   02/10/2023 87.8 79.0 - 97.0 fL Final   12/14/2021 88.0 80.0 - 100.0 fL Final     MCH   Date Value Ref Range Status   02/10/2023 29.0 26.6 - 33.0 pg Final   12/14/2021 28.8 26.0 - 34.0 pg Final     MCHC   Date Value Ref Range Status   02/10/2023 33.0 31.5 - 35.7 g/dL Final   12/14/2021 32.7 31.0 - 37.0 g/dL Final     RDW   Date Value Ref Range Status   02/10/2023 14.0 12.3 - 15.4 % Final   12/14/2021 13.9 12.0 - 16.8 % Final     RDW-SD   Date Value Ref Range Status   02/10/2023 45.4 37.0 - 54.0 fl Final     MPV   Date Value Ref Range Status   02/10/2023 9.2 6.0 - 12.0 fL Final   12/14/2021 9.5 8.4 - 12.4 fL Final     Platelets   Date Value Ref Range Status   02/10/2023 435 140 - 450 10*3/mm3 Final   12/14/2021 495 (H) 140 - 440 10*3/uL Final     Neutrophil Rel %   Date Value Ref Range  Status   12/14/2021 63.8 45 - 80 % Final     Neutrophil %   Date Value Ref Range Status   02/10/2023 77.9 (H) 42.7 - 76.0 % Final     Lymphocyte Rel %   Date Value Ref Range Status   12/14/2021 24.4 15 - 50 % Final     Lymphocyte %   Date Value Ref Range Status   02/10/2023 14.1 (L) 19.6 - 45.3 % Final     Monocyte Rel %   Date Value Ref Range Status   12/14/2021 9.0 0 - 15 % Final     Monocyte %   Date Value Ref Range Status   02/10/2023 5.6 5.0 - 12.0 % Final     Eosinophil %   Date Value Ref Range Status   02/10/2023 1.5 0.3 - 6.2 % Final   12/14/2021 1.4 0 - 7 % Final     Basophil Rel %   Date Value Ref Range Status   12/14/2021 0.6 0 - 2 % Final     Basophil %   Date Value Ref Range Status   02/10/2023 0.3 0.0 - 1.5 % Final     Immature Grans %   Date Value Ref Range Status   02/10/2023 0.6 (H) 0.0 - 0.5 % Final   12/14/2021 0.8 0.0 - 1.0 % Final     Neutrophils Absolute   Date Value Ref Range Status   12/14/2021 7.89 2.0 - 8.8 10*3/uL Final     Neutrophils, Absolute   Date Value Ref Range Status   02/10/2023 8.42 (H) 1.70 - 7.00 10*3/mm3 Final     Lymphocytes Absolute   Date Value Ref Range Status   12/14/2021 3.02 0.7 - 5.5 10*3/uL Final     Lymphocytes, Absolute   Date Value Ref Range Status   02/10/2023 1.52 0.70 - 3.10 10*3/mm3 Final     Monocytes Absolute   Date Value Ref Range Status   12/14/2021 1.12 0.0 - 1.7 10*3/uL Final     Monocytes, Absolute   Date Value Ref Range Status   02/10/2023 0.60 0.10 - 0.90 10*3/mm3 Final     Eosinophils Absolute   Date Value Ref Range Status   12/14/2021 0.17 0.0 - 0.8 10*3/uL Final     Eosinophils, Absolute   Date Value Ref Range Status   02/10/2023 0.16 0.00 - 0.40 10*3/mm3 Final     Basophils Absolute   Date Value Ref Range Status   12/14/2021 0.08 0.0 - 0.2 10*3/uL Final     Basophils, Absolute   Date Value Ref Range Status   02/10/2023 0.03 0.00 - 0.20 10*3/mm3 Final     Immature Grans, Absolute   Date Value Ref Range Status   02/10/2023 0.06 (H) 0.00 - 0.05  10*3/mm3 Final   12/14/2021 0.10 0.00 - 0.10 10*3/uL Final     nRBC   Date Value Ref Range Status   02/10/2023 0.0 0.0 - 0.2 /100 WBC Final        CMP:    Glucose   Date Value Ref Range Status   02/10/2023 110 74 - 124 mg/dL Final     BUN   Date Value Ref Range Status   02/10/2023 15 6 - 20 mg/dL Final   08/07/2020 5 (L) 7 - 20 mg/dL Final     Creatinine   Date Value Ref Range Status   02/10/2023 0.72 0.60 - 1.10 mg/dL Final   08/07/2020 0.6 (L) 0.7 - 1.5 mg/dL Final     Sodium   Date Value Ref Range Status   02/10/2023 135 134 - 145 mmol/L Final   08/07/2020 137 137 - 145 mmol/L Final     Potassium   Date Value Ref Range Status   02/10/2023 4.2 3.5 - 4.7 mmol/L Final   08/07/2020 3.5 3.5 - 5.1 mmol/L Final     Chloride   Date Value Ref Range Status   02/10/2023 103 98 - 107 mmol/L Final   08/07/2020 106 98 - 107 mmol/L Final     CO2   Date Value Ref Range Status   02/10/2023 20.3 (L) 22.0 - 29.0 mmol/L Final     Total CO2   Date Value Ref Range Status   08/07/2020 21 (L) 22 - 30 mmol/L Final     Calcium   Date Value Ref Range Status   02/10/2023 9.3 8.5 - 10.2 mg/dL Final   08/07/2020 8.4 8.4 - 10.2 mg/dL Final     Total Protein   Date Value Ref Range Status   02/10/2023 8.2 (H) 6.3 - 8.0 g/dL Final   08/05/2020 7.5 6.3 - 8.2 g/dL Final     Albumin   Date Value Ref Range Status   02/10/2023 4.3 3.5 - 5.2 g/dL Final   08/05/2020 4.1 3.5 - 5.0 g/dL Final     ALT (SGPT)   Date Value Ref Range Status   02/10/2023 20 0 - 33 U/L Final   08/05/2020 25 0 - 35 U/L Final     Comment:     If ALT testing ordered prior to 2359 on 11/16/19, please use reference range: Male 0-50, Female 0-35.  [email or web address] switched to a new ALT assay on 11/16/19 which yields results 10 U/L lower than the old assay.     AST (SGOT)   Date Value Ref Range Status   02/10/2023 21 0 - 32 U/L Final   08/05/2020 39 15 - 46 U/L Final     Alkaline Phosphatase   Date Value Ref Range Status   02/10/2023 84 38 - 116 U/L Final   08/05/2020 65  38 - 126 U/L Final     Total Bilirubin   Date Value Ref Range Status   02/10/2023 <0.2 (L) 0.2 - 1.2 mg/dL Final   08/05/2020 0.3 0.2 - 1.3 mg/dL Final     Globulin   Date Value Ref Range Status   02/10/2023 3.9 (H) 1.8 - 3.5 gm/dL Final   08/05/2020 3.4 1.5 - 4.5 g/dL Final     A/G Ratio   Date Value Ref Range Status   02/10/2023 1.1 1.1 - 2.4 g/dL Final     BUN/Creatinine Ratio   Date Value Ref Range Status   02/10/2023 20.8 7.3 - 30.0 Final   08/07/2020 8.6 RATIO Final     Anion Gap   Date Value Ref Range Status   02/10/2023 11.7 5.0 - 15.0 mmol/L Final               Assessment & Plan     Diagnoses and all orders for this visit:    1. Multiple myeloma not having achieved remission (HCC) (Primary)  -     CBC & Differential; Future  -     Comprehensive Metabolic Panel; Future  -     CBC & Differential; Future  -     Comprehensive Metabolic Panel; Future  -     CBC & Differential; Future  -     Comprehensive Metabolic Panel; Future  -     CBC & Differential; Future  -     Comprehensive Metabolic Panel; Future  -     Multiple Myeloma, Daratumumab Specific SUNNY; Future       On 12/06/2022 in regard to the assessment on this patient about her monoclonal protein we have a great difficulty obtaining the monoclonal protein studies performed by Darrin Ayala MD, in the office. We have performed laboratory testing 2 days ago, we do not have the report of this back both in blood and urine. Urinalysis documented a minimal degree of proteinuria with some ketosis and specific density of 1030. The patient has been dehydrated because she is very nauseated with the Cymbalta that was prescribed to her and I pointed out to the patient that probably the best thing to do will be to discontinue the Cymbalta until she seen Darrin Ayala MD, to allow the nausea to go away, that way she has proper nutrition and proper hydration. I will call her with the report of her monoclonal protein in blood and urine once that it becomes available. 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     In the same token the patient's white count is minimally elevated and also her platelet count is minimally elevated. All of this goes against any monoclonal protein severe process that typically goes along with leukopenia and thrombocytopenia.    From the point of view of her thrombocytosis I think this is a reactive phenomenon, her LDH is normal, her sedimentation rate is minimally elevated and she has no iron deficiency. Her ferritin, iron profile and reticulated hemoglobin are normal.     In regard to muscle pain I did perform a CPK and aldolase that was normal. A B12 level was normal, a folic acid level was normal and I did perform a hemoglobin electrophoresis that disclosed no evidence of hemoglobinopathy. Therefore she obviously has no sickle cell or things of this nature, therefore the muscle pain is probably a result of her Sjogren's syndrome. In this entity after my personal review muscle pain could be one of the features.     Finally she has a nodular goiter, she has a normal TSH and a normal T4. I will let Beau Frye MD, the patient's Primary Physician address this issue eventually with an ultrasound and clinical examination. The patient has antithyroid antibody positivity and very likely in my opinion she probably has a component of Hashimoto's thyroiditis.     Once that I get the report of her monoclonal protein in blood and urine an addendum will be dictated to this note and the patient will be informed. I pointed out to her that eventually we would like to review these studies, depending on what we find in 4-6 months just to continue monitoring her on clinical grounds. She agrees to proceed.     I did not prescribe any medications for her. I entered into the Epic system Cymbalta side effects significant nausea and  this medicine was advised for her to be discontinued until she sees Darrin Ayala MD, again.   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 She has an IgG kappa monoclonal protein. In the interim the patient underwent as posted above a bone marrow aspirate that shows an 18% infiltration of the bone marrow by plasma cells. Besides this the patient had also absence of iron stores. The patient had no evidence of myelofibrosis, lymphoma, leukemia, or myelodysplasia.     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 [place] red stain was negative for amyloid deposition. Given this fact I discussed with the patient today a plan of further staging of her disease that will require DEXA scan to measure quality of bone and osteopenia, osteoporosis, an MRI of the cervical, lumbar, and thoracic spine looking for lytic lesions and she will require as well formal chemotherapy education and consent for initiation of treatment for her disease. Her treatment will require the combination of Darzalex once a week, Velcade subcutaneously once a week, 3 weeks out of 4, Revlimid 15 mg a day for 3 weeks out of a month and dexamethasone 20 mg once a week. I discussed with her side effects of the medicines including allergic reactions to the administration for the Darzalex and febrile illness, peripheral neuropathy, diarrhea and thrombocytopenia for the Velcade; vein thrombosis, pulmonary embolism, fatigue and rash for the Revlimid, and elevation of blood pressure, elevation of blood sugar, gastric irritation, and hyperactivity with the dexamethasone. The patient will have formal education and consent for this regimen and she will be returning to see us back in a few days in order to go through her radiological  assessment.     In anticipation of initiation of treatment I also asked the patient to be seen by her dentist. She has the last upper molar on the left side with major decay. This needs to be removed before any consideration of Zometa administration on this patient.     I also discussed with the patient the need for initiation of a low-dose Xarelto 2.5 mg p.o. daily after she has had her molar extracted for initiation and before initiation of her Revlimid administration.     She will have formal education and consent for this regimen and she will have the need for insurance approval.     I pointed out to the patient that she will require treatment for the next 4 months and obviously will utilize monoclonal protein measurement in blood and urine to figure out the response and benefit of the regimen. Given the fact that she is 52 years old eventually the patient will be referred to the [facility] in consideration of high-dose chemotherapy and stem cell support.     Also I discussed with the patient that down the road given her absence of iron stores in her bone marrow she will require IV iron therapy. This could be accommodated at some point between all the infusions and treatments that she needs to have. We will request insurance company to let us do this as well.     I will monitor as well the patient on clinical symptomatology. It is difficult to know what are all the aches and pains related that she tells us as we know Sjogren syndrome can produce myopathy and muscle pain but I think a bone survey is necessary to redefine the anatomy of the cranium, long bones, spine, pelvic bone. She will be ready to proceed with this test as well in a few days.    I will be reviewing the patient back in a couple of weeks. Hopefully by then we should be completing the staging and the education and the patient should be ready for initiation of her treatment at that time.     I will communicate this to her  rheumatologist, Dr. Ayala, once that this report is completed and available tomorrow.    On 02/10/2023 the patient was further reviewed the day that she comes for her 2nd Darzalex infusion and 2nd Velcade injection. So far the patient has not encountered any side effects of the medicines. Obviously she has not had enough time to receive the benefit of this in regard to relieving the pain that she has in her muscles, lower extremities and back. As I pointed out to the patient and pointed out to the father of the patient present in the room today, we picked the patient's myeloma up very early. She has not had a chance to have any damage in her bones with lytic lesions and that is good news.     Therefore I recommended for her to continue her regimen as originally planned, she will require weekly laboratory parameters including CBC, CMP, and she will require also data specific serum protein immunoelectrophoresis at the visit in 1 month. I pointed out to her that by the 3rd month of treatment she should have no monoclonal protein left in blood and urine and we will do a urine collection as well to be sure that her Bence-Moon proteinuria disappears. She has not had any damage of the kidneys in regard to myeloma kidney disease because the degree of proteinuria and Bence-Moon protein is very minimal, barely detectable.     She is not receiving any Zometa or Xgeva because she has not developed any lytic lesions yet.     She has no need for pain medicine.     I will review her back in 4 weeks, she will be seen by nurse practitioner in 2 weeks.     I discussed all of these facts with the patient and her father.     Finally new suggestion that we have provides information that leads light into myeloma being maybe a genetic condition. I suggested to the father of the patient to ask her primary physician to run a serum protein immunoelectrophoresis on him.    ·

## 2023-02-10 NOTE — PROGRESS NOTES
Call rec from pt-she has completed the enrollment for the Revlimid copay card. I have obtained the needed processing information and asked pt to contact [facility] in a couple hours to coordinated the delivery. I have provided the copay card information to Swetha at [facility].    ID-[id number]  [id number]  [facility]-[id number]  PCN-KARTHIKEYAN Wellington  Specialty Pharmacy Technician

## 2023-02-10 NOTE — PROGRESS NOTES
I spoke with pt today about her Revlimid with [facility]. I was informed by Swetha at [facility] that they have been trying to reach pt to discuss the copay, offer assistance and coordinate delivery but have not been able to reach pt.    I attempted to enroll pt into the [facility] copay card for her Revlimid. They are needing to speak with pt. I provided pt with the phone number to call to complete enrollment into the copay card program as well as the phone number to call [facility].    My number was also provided should she have any questions.    Carmen Wellington  Specialty Pharmacy Technician

## 2023-02-13 ENCOUNTER — DOCUMENTATION (OUTPATIENT)
Dept: PHARMACY | Facility: HOSPITAL | Age: 53
End: 2023-02-13
Payer: COMMERCIAL

## 2023-02-13 NOTE — PROGRESS NOTES
Per Swetha at [facility]-They were able to reach pt via phone today and the Revlimid is scheduled to be delivered to her on 2/15/2023.     Carmen Wellington  Specialty Pharmacy Technician

## 2023-02-14 ENCOUNTER — TELEPHONE (OUTPATIENT)
Dept: ONCOLOGY | Facility: CLINIC | Age: 53
End: 2023-02-14

## 2023-02-14 ENCOUNTER — SPECIALTY PHARMACY (OUTPATIENT)
Dept: PHARMACY | Facility: HOSPITAL | Age: 53
End: 2023-02-14
Payer: COMMERCIAL

## 2023-02-14 DIAGNOSIS — C90.00 MULTIPLE MYELOMA NOT HAVING ACHIEVED REMISSION: ICD-10-CM

## 2023-02-14 RX ORDER — LENALIDOMIDE 15 MG/1
15 CAPSULE ORAL DAILY
Qty: 21 CAPSULE | Refills: 0 | Status: CANCELLED | OUTPATIENT
Start: 2023-02-14

## 2023-02-14 NOTE — TELEPHONE ENCOUNTER
Pharmacy Name: [facility] - [place], [place] - [address] [facility] [phone]  - [phone]      Pharmacy representative name:ANDREW    Pharmacy representative phone number: [phone]    What medication are you calling in regards to: REVLIMID    What question does the pharmacy have: PATIENT [employer] SURVEY WAS FLAGGED. PLEASE CONTACT [employer] AND RETURN CALL SO THAT RX CAN BE SHIPPED TODAY

## 2023-02-14 NOTE — PROGRESS NOTES
[facility] telephone encounter- Revlimid     [employer] flag resolved. [facility] notified.     Thanks,   Marianna Mays, TristanD

## 2023-02-17 ENCOUNTER — INFUSION (OUTPATIENT)
Dept: ONCOLOGY | Facility: HOSPITAL | Age: 53
End: 2023-02-17
Payer: COMMERCIAL

## 2023-02-17 ENCOUNTER — OFFICE VISIT (OUTPATIENT)
Dept: PSYCHIATRY | Facility: HOSPITAL | Age: 53
End: 2023-02-17
Payer: COMMERCIAL

## 2023-02-17 ENCOUNTER — LAB (OUTPATIENT)
Dept: LAB | Facility: HOSPITAL | Age: 53
End: 2023-02-17
Payer: COMMERCIAL

## 2023-02-17 VITALS
OXYGEN SATURATION: 95 % | HEART RATE: 79 BPM | SYSTOLIC BLOOD PRESSURE: 139 MMHG | BODY MASS INDEX: 32.42 KG/M2 | WEIGHT: 194.8 LBS | TEMPERATURE: 98.4 F | DIASTOLIC BLOOD PRESSURE: 88 MMHG | RESPIRATION RATE: 16 BRPM

## 2023-02-17 DIAGNOSIS — C90.00 MULTIPLE MYELOMA NOT HAVING ACHIEVED REMISSION: ICD-10-CM

## 2023-02-17 DIAGNOSIS — C90.00 MULTIPLE MYELOMA NOT HAVING ACHIEVED REMISSION: Primary | ICD-10-CM

## 2023-02-17 DIAGNOSIS — F32.1 CURRENT MODERATE EPISODE OF MAJOR DEPRESSIVE DISORDER WITHOUT PRIOR EPISODE: Primary | ICD-10-CM

## 2023-02-17 LAB
ALBUMIN SERPL-MCNC: 4.4 G/DL (ref 3.5–5.2)
ALBUMIN/GLOB SERPL: 1.3 G/DL (ref 1.1–2.4)
ALP SERPL-CCNC: 78 U/L (ref 38–116)
ALT SERPL W P-5'-P-CCNC: 22 U/L (ref 0–33)
ANION GAP SERPL CALCULATED.3IONS-SCNC: 12.4 MMOL/L (ref 5–15)
AST SERPL-CCNC: 17 U/L (ref 0–32)
BASOPHILS # BLD AUTO: 0.03 10*3/MM3 (ref 0–0.2)
BASOPHILS NFR BLD AUTO: 0.2 % (ref 0–1.5)
BILIRUB SERPL-MCNC: <0.2 MG/DL (ref 0.2–1.2)
BUN SERPL-MCNC: 12 MG/DL (ref 6–20)
BUN/CREAT SERPL: 11.7 (ref 7.3–30)
CALCIUM SPEC-SCNC: 10 MG/DL (ref 8.5–10.2)
CHLORIDE SERPL-SCNC: 104 MMOL/L (ref 98–107)
CO2 SERPL-SCNC: 20.6 MMOL/L (ref 22–29)
CREAT SERPL-MCNC: 1.03 MG/DL (ref 0.6–1.1)
DEPRECATED RDW RBC AUTO: 46 FL (ref 37–54)
EGFRCR SERPLBLD CKD-EPI 2021: 66.4 ML/MIN/1.73
EOSINOPHIL # BLD AUTO: 0.19 10*3/MM3 (ref 0–0.4)
EOSINOPHIL NFR BLD AUTO: 1.5 % (ref 0.3–6.2)
ERYTHROCYTE [DISTWIDTH] IN BLOOD BY AUTOMATED COUNT: 14.5 % (ref 12.3–15.4)
GLOBULIN UR ELPH-MCNC: 3.4 GM/DL (ref 1.8–3.5)
GLUCOSE SERPL-MCNC: 105 MG/DL (ref 74–124)
HCT VFR BLD AUTO: 38.3 % (ref 34–46.6)
HGB BLD-MCNC: 12.5 G/DL (ref 12–15.9)
IMM GRANULOCYTES # BLD AUTO: 0.11 10*3/MM3 (ref 0–0.05)
IMM GRANULOCYTES NFR BLD AUTO: 0.9 % (ref 0–0.5)
LYMPHOCYTES # BLD AUTO: 1.37 10*3/MM3 (ref 0.7–3.1)
LYMPHOCYTES NFR BLD AUTO: 10.6 % (ref 19.6–45.3)
MCH RBC QN AUTO: 28.7 PG (ref 26.6–33)
MCHC RBC AUTO-ENTMCNC: 32.6 G/DL (ref 31.5–35.7)
MCV RBC AUTO: 88 FL (ref 79–97)
MONOCYTES # BLD AUTO: 0.61 10*3/MM3 (ref 0.1–0.9)
MONOCYTES NFR BLD AUTO: 4.7 % (ref 5–12)
NEUTROPHILS NFR BLD AUTO: 10.6 10*3/MM3 (ref 1.7–7)
NEUTROPHILS NFR BLD AUTO: 82.1 % (ref 42.7–76)
NRBC BLD AUTO-RTO: 0 /100 WBC (ref 0–0.2)
PLATELET # BLD AUTO: 444 10*3/MM3 (ref 140–450)
PMV BLD AUTO: 9.3 FL (ref 6–12)
POTASSIUM SERPL-SCNC: 4.7 MMOL/L (ref 3.5–4.7)
PROT SERPL-MCNC: 7.8 G/DL (ref 6.3–8)
RBC # BLD AUTO: 4.35 10*6/MM3 (ref 3.77–5.28)
SODIUM SERPL-SCNC: 137 MMOL/L (ref 134–145)
WBC NRBC COR # BLD: 12.91 10*3/MM3 (ref 3.4–10.8)

## 2023-02-17 PROCEDURE — 63710000001 DIPHENHYDRAMINE PER 50 MG: Performed by: INTERNAL MEDICINE

## 2023-02-17 PROCEDURE — 80053 COMPREHEN METABOLIC PANEL: CPT

## 2023-02-17 PROCEDURE — 25010000002 DARATUMUMAB-HYALURONIDASE-FIHJ 1800-30000 MG-UT/15ML SOLUTION: Performed by: INTERNAL MEDICINE

## 2023-02-17 PROCEDURE — 36415 COLL VENOUS BLD VENIPUNCTURE: CPT

## 2023-02-17 PROCEDURE — 25010000002 BORTEZOMIB PER 0.1 MG: Performed by: INTERNAL MEDICINE

## 2023-02-17 PROCEDURE — 85025 COMPLETE CBC W/AUTO DIFF WBC: CPT

## 2023-02-17 PROCEDURE — 90792 PSYCH DIAG EVAL W/MED SRVCS: CPT | Performed by: NURSE PRACTITIONER

## 2023-02-17 PROCEDURE — 96401 CHEMO ANTI-NEOPL SQ/IM: CPT

## 2023-02-17 RX ORDER — ACETAMINOPHEN 500 MG
1000 TABLET ORAL ONCE
Status: COMPLETED | OUTPATIENT
Start: 2023-02-17 | End: 2023-02-17

## 2023-02-17 RX ORDER — BORTEZOMIB 3.5 MG/1
1.3 INJECTION, POWDER, LYOPHILIZED, FOR SOLUTION INTRAVENOUS; SUBCUTANEOUS ONCE
Status: COMPLETED | OUTPATIENT
Start: 2023-02-17 | End: 2023-02-17

## 2023-02-17 RX ORDER — DIPHENHYDRAMINE HCL 25 MG
25 CAPSULE ORAL ONCE
Status: COMPLETED | OUTPATIENT
Start: 2023-02-17 | End: 2023-02-17

## 2023-02-17 RX ORDER — FAMOTIDINE 20 MG/1
20 TABLET, FILM COATED ORAL
Status: DISCONTINUED | OUTPATIENT
Start: 2023-02-17 | End: 2023-02-17

## 2023-02-17 RX ORDER — SODIUM CHLORIDE 9 MG/ML
250 INJECTION, SOLUTION INTRAVENOUS ONCE
Status: DISCONTINUED | OUTPATIENT
Start: 2023-02-17 | End: 2023-02-17 | Stop reason: HOSPADM

## 2023-02-17 RX ORDER — FAMOTIDINE 20 MG/1
20 TABLET, FILM COATED ORAL ONCE
Status: COMPLETED | OUTPATIENT
Start: 2023-02-17 | End: 2023-02-17

## 2023-02-17 RX ADMIN — FAMOTIDINE 20 MG: 20 TABLET ORAL at 11:36

## 2023-02-17 RX ADMIN — BORTEZOMIB 2.5 MG: 3.5 INJECTION, POWDER, LYOPHILIZED, FOR SOLUTION INTRAVENOUS; SUBCUTANEOUS at 12:09

## 2023-02-17 RX ADMIN — DIPHENHYDRAMINE HYDROCHLORIDE 25 MG: 25 CAPSULE ORAL at 11:36

## 2023-02-17 RX ADMIN — ACETAMINOPHEN 1000 MG: 500 TABLET, FILM COATED ORAL at 11:36

## 2023-02-17 RX ADMIN — DARATUMUMAB AND HYALURONIDASE-FIHJ (HUMAN RECOMBINANT) 1800 MG: 1800; 30000 INJECTION SUBCUTANEOUS at 12:08

## 2023-02-17 NOTE — PROGRESS NOTES
In Person  Provider Location: [facility]    Chief Complaint: Fatigue    Subjective  Patient ID: Yudy Hinton is a 52 y.o. female who presents for initial consultation through the [facility] at the request of Emelia Nixon, * Father, Alexis and daughter, Alessandro, present with patient permission and request.    HPI:  Pt is a 52 year old female who presents to behavioral onc clinic at request of med onc regarding sx of fatigue alongside newly diagnosed multiple myeloma. Pt describes feeling all over the place. Reports working at the [employer] for 18 years, now struggling to manage needs as single mother with 2 young adult children still living at home.  Patient recalls recent events including various testing due to pain and fatigue prior to diagnosis with multiple myeloma. Pt reports personal acceptance of diagnosis, although mourns disconnect perceived by family in terms of diagnosis.  Patient describes herself as being happy go orin, optimistic, smiling all the time.  Acknowledges people are telling her she has 'lost her sparkle.'  Patient confirms feelings of fatigue, disengagement, isolation.  Denies historical depression, anxiety, or mental health disturbance.  Patient is Pent[employer]alism; attends [employer] and feels well connected to this community. Appreciates ability to attend [facility], [facility] at any time. Pt reports extensive support network, although finds she is not answering calls or making plans like she used to.    PHQ-9 not formally conducted, although with significant notation of symptoms endorsed during assessment. Pt acknowledges reduced interest and pleasure, noting tendency to isolate.  Feels run down.  Sleep is easy to initiate and continuous, finding she is so exhausted that while pain contributes to restlessness, she is able to sleep.  Pt continues to work five days a week 6:00 AM to 4:30 PM. Has received medical  order to prohibit overtime. Pt repots getting up at 4 AM, usually going to bed appx 8:30-9 PM. Acknowledges limited time and energy to participate in enjoyable activities outside of this. Weekends are newly available to patient, previously having 2 days off during the week with ability to grocery shop, attend appointments, etc.  Finds Saturdays and Sundays are spent watching TV, acknowledging sense of overwhelm due to grocery and other businesses being especially busy during the weekend.  Patient reports limited exercise, although has an exercise bike and has used this more in the past. Does like to walk, although pain in arms and legs limits. Pt reports pain level 4/10. Pt reports this as being radically acceptable, while not desirable. Can only walk so far. Pt is interested in doable workout routine. Does endorse feelings of fatigue, reduced concentration, feelings of letting others down. Pt denies thoughts of suicide or self harm.    Medication history reviewed; pt was initiated on cymbalta alongside exacerbation of recent pain, although discontinued due to persistence of nausea.  Chart reviewed with dose felt to be 30 mg once daily.  Patient does attribute nausea to this medication, and states she was on it for approximately 1 month.  Since discontinuing, nausea has resolved.    Pt lives at home, single mother of 3 children, with obligation to provide financial and emotional stability to family. Some challenges regarding lack of understanding appreciated from son's regarding current illness and needs.     Pt did have     Social History  Marital Status: single mom;  once  Children: three; 15, 22, and 31; grandsons  Support Community: father, daughter, friends  Highest Level of Education: college graduate  Career: [employer] 18 years  Tobacco Use: The patient denies current or previous tobacco use.  Alcohol Use: occasional/rare use  Marijuana/ Other drug Use: denied.    Medical History  Psychiatric  History: None  Hx brain aneurysm; some facial neuropathy as residual symptom.  Questionable focal seizures, noting she will stare off into space from time to time.    Family History  Family Psychiatric History: There has been no family history of psychological problems  Family Cancer History: Grandparents - hx colon, pancreatic, and liver cancers    The following portions of the patient's history were reviewed and updated as appropriate: She  has a past medical history of Achilles tendonitis, Anemia, Anxiety, Arthritis, Asthma, Benign essential hypertension, Cerebral aneurysm, GERD (gastroesophageal reflux disease), Headache, History of anemia, History of E. coli septicemia, Rotator cuff syndrome, and Seasonal allergies.    She  has a past surgical history that includes Shoulder surgery; Rotator cuff repair (Right, 2012); Abscess drainage (2000); Hysterectomy (2017); Cerebral angiogram (2020); and Craniotomy (2020).  Her family history includes Cancer in her paternal grandfather; Colon cancer in her paternal aunt and paternal grandmother; Diabetes in her brother; Heart attack in her maternal grandmother and paternal grandfather; Heart disease in her maternal grandmother; Hypertension in her father; Multiple sclerosis in her brother; Pancreatic cancer in her maternal grandfather.  She  reports that she has never smoked. She has never used smokeless tobacco. She reports current alcohol use. She reports that she does not use drugs.  Current Outpatient Medications   Medication Sig Dispense Refill   • acyclovir (ZOVIRAX) 400 MG tablet Take 1 tablet by mouth 2 (Two) Times a Day. 60 tablet 3   • albuterol sulfate  (90 Base) MCG/ACT inhaler Ventolin HFA 90 mcg/actuation aerosol inhaler     • amitriptyline (ELAVIL) 25 MG tablet      • amLODIPine (NORVASC) 5 MG tablet      • dexamethasone (DECADRON) 4 MG tablet Take 5 tablets by mouth 1 (One) Time Per Week. Take in the morning with food. 20 tablet 3   • Emgality 120  MG/ML auto-injector pen INJECT 1 ML SUBCUTANEOUSLY ONCE EVERY MONTH     • hydrOXYzine (ATARAX) 25 MG tablet      • lenalidomide (REVLIMID) 15 MG capsule Take 1 capsule by mouth Daily. Take for 21 days on then 7 days off 21 capsule 0   • Magnesium Oxide 500 MG tablet magnesium oxide 500 mg tablet   TAKE 1 TABLET BY MOUTH NIGHTLY     • metoprolol tartrate (LOPRESSOR) 50 MG tablet Take 50 mg by mouth Every Morning.     • ondansetron (ZOFRAN) 8 MG tablet Take 1 tablet by mouth 3 (Three) Times a Day As Needed for Nausea or Vomiting. 30 tablet 5   • Rimegepant Sulfate (NURTEC) 75 MG tablet dispersible tablet Take 1 tablet by mouth Daily As Needed.     • Rivaroxaban (Xarelto) 2.5 MG tablet Take 1 tablet by mouth Daily. 30 tablet 2   • sulfamethoxazole-trimethoprim (BACTRIM DS,SEPTRA DS) 800-160 MG per tablet Take 1 tablet by mouth 3 (Three) Times a Week. Take 1 tablet on Mondays, Wednesdays and Fridays. 12 tablet 3     No current facility-administered medications for this visit.     Facility-Administered Medications Ordered in Other Visits   Medication Dose Route Frequency Provider Last Rate Last Admin   • sodium chloride 0.9 % infusion 250 mL  250 mL Intravenous Once Estuardo Freedman MD         Current Outpatient Medications on File Prior to Visit   Medication Sig   • acyclovir (ZOVIRAX) 400 MG tablet Take 1 tablet by mouth 2 (Two) Times a Day.   • albuterol sulfate  (90 Base) MCG/ACT inhaler Ventolin HFA 90 mcg/actuation aerosol inhaler   • amitriptyline (ELAVIL) 25 MG tablet    • amLODIPine (NORVASC) 5 MG tablet    • dexamethasone (DECADRON) 4 MG tablet Take 5 tablets by mouth 1 (One) Time Per Week. Take in the morning with food.   • Emgality 120 MG/ML auto-injector pen INJECT 1 ML SUBCUTANEOUSLY ONCE EVERY MONTH   • hydrOXYzine (ATARAX) 25 MG tablet    • lenalidomide (REVLIMID) 15 MG capsule Take 1 capsule by mouth Daily. Take for 21 days on then 7 days off   • Magnesium Oxide 500 MG tablet magnesium oxide 500  mg tablet   TAKE 1 TABLET BY MOUTH NIGHTLY   • metoprolol tartrate (LOPRESSOR) 50 MG tablet Take 50 mg by mouth Every Morning.   • ondansetron (ZOFRAN) 8 MG tablet Take 1 tablet by mouth 3 (Three) Times a Day As Needed for Nausea or Vomiting.   • Rimegepant Sulfate (NURTEC) 75 MG tablet dispersible tablet Take 1 tablet by mouth Daily As Needed.   • Rivaroxaban (Xarelto) 2.5 MG tablet Take 1 tablet by mouth Daily.   • sulfamethoxazole-trimethoprim (BACTRIM DS,SEPTRA DS) 800-160 MG per tablet Take 1 tablet by mouth 3 (Three) Times a Week. Take 1 tablet on Mondays, Wednesdays and Fridays.     No current facility-administered medications on file prior to visit.     She is allergic to coconut and eggs or egg-derived products..    Review of Systems   Constitutional: Positive for activity change, appetite change and fatigue.   Gastrointestinal: Negative for nausea.   Musculoskeletal: Positive for arthralgias.   Psychiatric/Behavioral: Positive for decreased concentration and dysphoric mood. Negative for sleep disturbance and suicidal ideas. The patient is nervous/anxious.      Objective   Mental Status Exam  Appearance:  clean and casually dressed, appropriate  Attitude toward clinician:  cooperative and agreeable   Speech:    Rate:  regular rate and rhythm   Volume:  normal  Motor:  no abnormal movements present  Mood:  overwhelmed  Affect:  mood congruent  Thought Processes:  linear, logical, and goal directed  Thought Content:  normal  Suicidal Thoughts:  absent  Homicidal Thoughts:  absent  Perceptual Disturbance: no perceptual disturbance  Attention and Concentration:  fair  Insight and Judgement:  fair  Memory:  memory appears to be intact    Lab Review:   Infusion on 02/10/2023   Component Date Value   • Glucose 02/10/2023 110    • BUN 02/10/2023 15    • Creatinine 02/10/2023 0.72    • Sodium 02/10/2023 135    • Potassium 02/10/2023 4.2    • Chloride 02/10/2023 103    • CO2 02/10/2023 20.3 (L)    • Calcium  02/10/2023 9.3    • Total Protein 02/10/2023 8.2 (H)    • Albumin 02/10/2023 4.3    • ALT (SGPT) 02/10/2023 20    • AST (SGOT) 02/10/2023 21    • Alkaline Phosphatase 02/10/2023 84    • Total Bilirubin 02/10/2023 <0.2 (L)    • Globulin 02/10/2023 3.9 (H)    • A/G Ratio 02/10/2023 1.1    • BUN/Creatinine Ratio 02/10/2023 20.8    • Anion Gap 02/10/2023 11.7    • eGFR 02/10/2023 102.0    • WBC 02/10/2023 10.79    • RBC 02/10/2023 4.35    • Hemoglobin 02/10/2023 12.6    • Hematocrit 02/10/2023 38.2    • MCV 02/10/2023 87.8    • MCH 02/10/2023 29.0    • MCHC 02/10/2023 33.0    • RDW 02/10/2023 14.0    • RDW-SD 02/10/2023 45.4    • MPV 02/10/2023 9.2    • Platelets 02/10/2023 435    • Neutrophil % 02/10/2023 77.9 (H)    • Lymphocyte % 02/10/2023 14.1 (L)    • Monocyte % 02/10/2023 5.6    • Eosinophil % 02/10/2023 1.5    • Basophil % 02/10/2023 0.3    • Immature Grans % 02/10/2023 0.6 (H)    • Neutrophils, Absolute 02/10/2023 8.42 (H)    • Lymphocytes, Absolute 02/10/2023 1.52    • Monocytes, Absolute 02/10/2023 0.60    • Eosinophils, Absolute 02/10/2023 0.16    • Basophils, Absolute 02/10/2023 0.03    • Immature Grans, Absolute 02/10/2023 0.06 (H)    • nRBC 02/10/2023 0.0    Infusion on 02/03/2023   Component Date Value   • Glucose 02/03/2023 142 (H)    • BUN 02/03/2023 13    • Creatinine 02/03/2023 0.92    • Sodium 02/03/2023 136    • Potassium 02/03/2023 3.8    • Chloride 02/03/2023 100    • CO2 02/03/2023 20.3 (L)    • Calcium 02/03/2023 9.8    • Total Protein 02/03/2023 9.3 (H)    • Albumin 02/03/2023 4.7    • ALT (SGPT) 02/03/2023 24    • AST (SGOT) 02/03/2023 31    • Alkaline Phosphatase 02/03/2023 84    • Total Bilirubin 02/03/2023 0.2    • Globulin 02/03/2023 4.6 (H)    • A/G Ratio 02/03/2023 1.0 (L)    • BUN/Creatinine Ratio 02/03/2023 14.1    • Anion Gap 02/03/2023 15.7 (H)    • eGFR 02/03/2023 76.0    • HCG, Urine QL 02/03/2023 Negative    • WBC 02/03/2023 11.43 (H)    • RBC 02/03/2023 4.61    • Hemoglobin  02/03/2023 13.3    • Hematocrit 02/03/2023 40.4    • MCV 02/03/2023 87.6    • MCH 02/03/2023 28.9    • MCHC 02/03/2023 32.9    • RDW 02/03/2023 13.9    • RDW-SD 02/03/2023 44.2    • MPV 02/03/2023 9.4    • Platelets 02/03/2023 541 (H)    • Neutrophil % 02/03/2023 88.1 (H)    • Lymphocyte % 02/03/2023 9.5 (L)    • Monocyte % 02/03/2023 1.0 (L)    • Eosinophil % 02/03/2023 0.1 (L)    • Basophil % 02/03/2023 0.3    • Immature Grans % 02/03/2023 1.0 (H)    • Neutrophils, Absolute 02/03/2023 10.06 (H)    • Lymphocytes, Absolute 02/03/2023 1.09    • Monocytes, Absolute 02/03/2023 0.12    • Eosinophils, Absolute 02/03/2023 0.01    • Basophils, Absolute 02/03/2023 0.03    • Immature Grans, Absolute 02/03/2023 0.12 (H)    • nRBC 02/03/2023 0.0    • ABO Type 02/03/2023 O    • RH type 02/03/2023 Positive    • Antibody Screen 02/03/2023 Negative    • T&S Expiration Date 02/03/2023 2/4/2023 11:59:00 PM    • K antigen 02/03/2023 Negative    Lab on 01/18/2023   Component Date Value   • Glucose 01/18/2023 100    • BUN 01/18/2023 17    • Creatinine 01/18/2023 0.85    • Sodium 01/18/2023 138    • Potassium 01/18/2023 4.0    • Chloride 01/18/2023 104    • CO2 01/18/2023 21.6 (L)    • Calcium 01/18/2023 9.6    • Total Protein 01/18/2023 8.4 (H)    • Albumin 01/18/2023 4.2    • ALT (SGPT) 01/18/2023 12    • AST (SGOT) 01/18/2023 15    • Alkaline Phosphatase 01/18/2023 75    • Total Bilirubin 01/18/2023 0.3    • Globulin 01/18/2023 4.2 (H)    • A/G Ratio 01/18/2023 1.0 (L)    • BUN/Creatinine Ratio 01/18/2023 20.0    • Anion Gap 01/18/2023 12.4    • eGFR 01/18/2023 83.6    • IgG 01/18/2023 2357 (H)    • IgA 01/18/2023 180    • IgM 01/18/2023 70    • Total Protein 01/18/2023 8.2    • Albumin 01/18/2023 4.0    • Alpha-1-Globulin 01/18/2023 0.2    • Alpha-2-Globulin 01/18/2023 0.9    • Beta Globulin 01/18/2023 1.1    • Gamma Globulin 01/18/2023 2.0 (H)    • M-Alvarez 01/18/2023 1.2 (H)    • Globulin 01/18/2023 4.2 (H)    • A/G Ratio  01/18/2023 1.0    • Immunofixation Reflex, S* 01/18/2023 Comment (A)    • Please note 01/18/2023 Comment    • Free Light Chain, Kappa 01/18/2023 46.0 (H)    • Free Lambda Light Chains 01/18/2023 15.4    • Kappa/Lambda Ratio 01/18/2023 2.99 (H)    • WBC 01/18/2023 11.31 (H)    • RBC 01/18/2023 4.45    • Hemoglobin 01/18/2023 12.6    • Hematocrit 01/18/2023 39.6    • MCV 01/18/2023 89.0    • MCH 01/18/2023 28.3    • MCHC 01/18/2023 31.8    • RDW 01/18/2023 13.9    • RDW-SD 01/18/2023 45.2    • MPV 01/18/2023 9.3    • Platelets 01/18/2023 447    • Neutrophil % 01/18/2023 64.8    • Lymphocyte % 01/18/2023 25.6    • Monocyte % 01/18/2023 7.3    • Eosinophil % 01/18/2023 1.2    • Basophil % 01/18/2023 0.5    • Immature Grans % 01/18/2023 0.6 (H)    • Neutrophils, Absolute 01/18/2023 7.32 (H)    • Lymphocytes, Absolute 01/18/2023 2.90    • Monocytes, Absolute 01/18/2023 0.82    • Eosinophils, Absolute 01/18/2023 0.14    • Basophils, Absolute 01/18/2023 0.06    • Immature Grans, Absolute 01/18/2023 0.07 (H)    • nRBC 01/18/2023 0.0    Hospital Outpatient Visit on 01/04/2023   Component Date Value   • WBC 01/04/2023 9.24    • RBC 01/04/2023 4.21    • Hemoglobin 01/04/2023 11.9 (L)    • Hematocrit 01/04/2023 35.1    • MCV 01/04/2023 83.4    • MCH 01/04/2023 28.3    • MCHC 01/04/2023 33.9    • RDW 01/04/2023 13.3    • RDW-SD 01/04/2023 41.1    • MPV 01/04/2023 9.5    • Platelets 01/04/2023 448    • Neutrophil % 01/04/2023 54.2    • Lymphocyte % 01/04/2023 30.6    • Monocyte % 01/04/2023 9.8    • Eosinophil % 01/04/2023 4.3    • Basophil % 01/04/2023 0.5    • Immature Grans % 01/04/2023 0.6 (H)    • Neutrophils, Absolute 01/04/2023 4.99    • Lymphocytes, Absolute 01/04/2023 2.83    • Monocytes, Absolute 01/04/2023 0.91 (H)    • Eosinophils, Absolute 01/04/2023 0.40    • Basophils, Absolute 01/04/2023 0.05    • Immature Grans, Absolute 01/04/2023 0.06 (H)    • nRBC 01/04/2023 0.0    • Addendum 01/04/2023                       Value:This result contains rich text formatting which cannot be displayed here.   • Case Report 01/04/2023                      Value:Surgical Pathology Report                         Case: [id number]                                  Authorizing Provider:  Estuardo Freedman MD        Collected:           01/04/2023 09:32 AM          Ordering Location:     [facility]  Received:            01/04/2023 09:49 AM                                 CT                                                                           Pathologist:           Kelvin Medellin MD                                                         Specimens:   1) - Iliac Crest, Right - Biopsy, bone marrow bx                                                    2) - Iliac Crest, Right - Aspirate, 4 smears and 1 CB                                               3) - Iliac Crest, Right - Aspirate, 2 green top tubes for cytogenetics and 1 purple                 top tube for flow cytometry sent to [place]                                                   • Clinical Information 01/04/2023                      Value:This result contains rich text formatting which cannot be displayed here.   • Final Diagnosis 01/04/2023                      Value:This result contains rich text formatting which cannot be displayed here.   • Preliminary Diagnosis 01/04/2023                      Value:This result contains rich text formatting which cannot be displayed here.   • Comment 01/04/2023                      Value:This result contains rich text formatting which cannot be displayed here.   • Gross Description 01/04/2023                      Value:This result contains rich text formatting which cannot be displayed here.       Diagnoses and all orders for this visit:    1. Current moderate episode of major depressive disorder without prior episode (HCC) (Primary)    Plan of Care  Pt with new onset sx of depression, first episode, alongsdie dx and tx of Multiple  Myeloma. Discussed pt preference for nonpharmacological approach to treatment, and have supported pt in this. Considered CPT techniques including balance of avoidance with engagement, limit/ boundary setting, and dedication to physical activity. Provided pt with information for [employer] program at [facility].  Medication considerations reviewed to include wellbutrin for depression, activation. Would likely use in conjunction with gabapentin for sx of pain, complicated neurological/ seizure history. Added benefit of reduced pain, ruminative worry.  Follow up scheduled in clinic in 2-4 weeks.

## 2023-02-22 ENCOUNTER — TELEPHONE (OUTPATIENT)
Dept: ONCOLOGY | Facility: CLINIC | Age: 53
End: 2023-02-22
Payer: COMMERCIAL

## 2023-02-22 NOTE — TELEPHONE ENCOUNTER
Caller: Yudy Hinton    Relationship to patient: Self    Best call back number: [phone]    Type of visit: LAB, FOLLOW UP, AND INFUSION    Requested date: SAME DAY BUT LATER IN AFTERNOON    If rescheduling, when is the original appointment: 02/24     Additional notes: PLEASE CALL ONCE R/S.    SHE PREFERS HER APPTS ON FRIDAYS TO BE AROUND 1PM INSTEAD OF 10AM PLEASE.

## 2023-02-24 ENCOUNTER — INFUSION (OUTPATIENT)
Dept: ONCOLOGY | Facility: HOSPITAL | Age: 53
End: 2023-02-24
Payer: COMMERCIAL

## 2023-02-24 ENCOUNTER — OFFICE VISIT (OUTPATIENT)
Dept: ONCOLOGY | Facility: CLINIC | Age: 53
End: 2023-02-24
Payer: COMMERCIAL

## 2023-02-24 ENCOUNTER — LAB (OUTPATIENT)
Dept: LAB | Facility: HOSPITAL | Age: 53
End: 2023-02-24
Payer: COMMERCIAL

## 2023-02-24 VITALS
OXYGEN SATURATION: 97 % | BODY MASS INDEX: 33.05 KG/M2 | WEIGHT: 198.4 LBS | SYSTOLIC BLOOD PRESSURE: 144 MMHG | HEIGHT: 65 IN | TEMPERATURE: 97.3 F | DIASTOLIC BLOOD PRESSURE: 87 MMHG | HEART RATE: 90 BPM | RESPIRATION RATE: 16 BRPM

## 2023-02-24 DIAGNOSIS — C90.00 MULTIPLE MYELOMA NOT HAVING ACHIEVED REMISSION: Primary | ICD-10-CM

## 2023-02-24 DIAGNOSIS — C90.00 MULTIPLE MYELOMA NOT HAVING ACHIEVED REMISSION: ICD-10-CM

## 2023-02-24 LAB
ALBUMIN SERPL-MCNC: 4.6 G/DL (ref 3.5–5.2)
ALBUMIN/GLOB SERPL: 1.3 G/DL (ref 1.1–2.4)
ALP SERPL-CCNC: 83 U/L (ref 38–116)
ALT SERPL W P-5'-P-CCNC: 37 U/L (ref 0–33)
ANION GAP SERPL CALCULATED.3IONS-SCNC: 18.4 MMOL/L (ref 5–15)
AST SERPL-CCNC: 32 U/L (ref 0–32)
BASOPHILS # BLD AUTO: 0.01 10*3/MM3 (ref 0–0.2)
BASOPHILS NFR BLD AUTO: 0.1 % (ref 0–1.5)
BILIRUB SERPL-MCNC: 0.2 MG/DL (ref 0.2–1.2)
BUN SERPL-MCNC: 9 MG/DL (ref 6–20)
BUN/CREAT SERPL: 12.9 (ref 7.3–30)
CALCIUM SPEC-SCNC: 10.2 MG/DL (ref 8.5–10.2)
CHLORIDE SERPL-SCNC: 100 MMOL/L (ref 98–107)
CO2 SERPL-SCNC: 19.6 MMOL/L (ref 22–29)
CREAT SERPL-MCNC: 0.7 MG/DL (ref 0.6–1.1)
DEPRECATED RDW RBC AUTO: 47.6 FL (ref 37–54)
EGFRCR SERPLBLD CKD-EPI 2021: 105.5 ML/MIN/1.73
EOSINOPHIL # BLD AUTO: 0 10*3/MM3 (ref 0–0.4)
EOSINOPHIL NFR BLD AUTO: 0 % (ref 0.3–6.2)
ERYTHROCYTE [DISTWIDTH] IN BLOOD BY AUTOMATED COUNT: 14.7 % (ref 12.3–15.4)
GLOBULIN UR ELPH-MCNC: 3.6 GM/DL (ref 1.8–3.5)
GLUCOSE SERPL-MCNC: 169 MG/DL (ref 74–124)
HCT VFR BLD AUTO: 40.7 % (ref 34–46.6)
HGB BLD-MCNC: 12.7 G/DL (ref 12–15.9)
IMM GRANULOCYTES # BLD AUTO: 0.05 10*3/MM3 (ref 0–0.05)
IMM GRANULOCYTES NFR BLD AUTO: 0.5 % (ref 0–0.5)
LYMPHOCYTES # BLD AUTO: 0.57 10*3/MM3 (ref 0.7–3.1)
LYMPHOCYTES NFR BLD AUTO: 5.6 % (ref 19.6–45.3)
MCH RBC QN AUTO: 27.9 PG (ref 26.6–33)
MCHC RBC AUTO-ENTMCNC: 31.2 G/DL (ref 31.5–35.7)
MCV RBC AUTO: 89.3 FL (ref 79–97)
MONOCYTES # BLD AUTO: 0.1 10*3/MM3 (ref 0.1–0.9)
MONOCYTES NFR BLD AUTO: 1 % (ref 5–12)
NEUTROPHILS NFR BLD AUTO: 9.45 10*3/MM3 (ref 1.7–7)
NEUTROPHILS NFR BLD AUTO: 92.8 % (ref 42.7–76)
NRBC BLD AUTO-RTO: 0 /100 WBC (ref 0–0.2)
PLATELET # BLD AUTO: 412 10*3/MM3 (ref 140–450)
PMV BLD AUTO: 10 FL (ref 6–12)
POTASSIUM SERPL-SCNC: 4 MMOL/L (ref 3.5–4.7)
PROT SERPL-MCNC: 8.2 G/DL (ref 6.3–8)
RBC # BLD AUTO: 4.56 10*6/MM3 (ref 3.77–5.28)
SODIUM SERPL-SCNC: 138 MMOL/L (ref 134–145)
WBC NRBC COR # BLD: 10.18 10*3/MM3 (ref 3.4–10.8)

## 2023-02-24 PROCEDURE — 96401 CHEMO ANTI-NEOPL SQ/IM: CPT

## 2023-02-24 PROCEDURE — 80053 COMPREHEN METABOLIC PANEL: CPT

## 2023-02-24 PROCEDURE — 99214 OFFICE O/P EST MOD 30 MIN: CPT | Performed by: NURSE PRACTITIONER

## 2023-02-24 PROCEDURE — 63710000001 DIPHENHYDRAMINE PER 50 MG: Performed by: INTERNAL MEDICINE

## 2023-02-24 PROCEDURE — 36415 COLL VENOUS BLD VENIPUNCTURE: CPT

## 2023-02-24 PROCEDURE — 85025 COMPLETE CBC W/AUTO DIFF WBC: CPT

## 2023-02-24 PROCEDURE — 25010000002 DARATUMUMAB-HYALURONIDASE-FIHJ 1800-30000 MG-UT/15ML SOLUTION: Performed by: INTERNAL MEDICINE

## 2023-02-24 RX ORDER — FAMOTIDINE 20 MG/1
20 TABLET, FILM COATED ORAL ONCE
Status: COMPLETED | OUTPATIENT
Start: 2023-02-24 | End: 2023-02-24

## 2023-02-24 RX ORDER — ACETAMINOPHEN 500 MG
1000 TABLET ORAL ONCE
Status: COMPLETED | OUTPATIENT
Start: 2023-02-24 | End: 2023-02-24

## 2023-02-24 RX ORDER — LEVOTHYROXINE SODIUM 0.03 MG/1
TABLET ORAL
COMMUNITY
Start: 2023-02-24

## 2023-02-24 RX ORDER — DIPHENHYDRAMINE HCL 25 MG
25 CAPSULE ORAL ONCE
Status: COMPLETED | OUTPATIENT
Start: 2023-02-24 | End: 2023-02-24

## 2023-02-24 RX ADMIN — DIPHENHYDRAMINE HYDROCHLORIDE 25 MG: 25 CAPSULE ORAL at 14:10

## 2023-02-24 RX ADMIN — DARATUMUMAB AND HYALURONIDASE-FIHJ (HUMAN RECOMBINANT) 1800 MG: 1800; 30000 INJECTION SUBCUTANEOUS at 14:54

## 2023-02-24 RX ADMIN — FAMOTIDINE 20 MG: 20 TABLET ORAL at 14:10

## 2023-02-24 RX ADMIN — ACETAMINOPHEN 1000 MG: 500 TABLET, FILM COATED ORAL at 14:10

## 2023-02-24 NOTE — PROGRESS NOTES
Subjective         DURING THE VISIT WITH THE PATIENT TODAY , PATIENT HAD FACE MASK, MY MEDICAL ASSISTANT AND I  HAD PROPPER PROTECTIVE EQUIPMENT, AND I DID HAND HYGIENE WITH SOAP AND WATER BEFORE AND AFTER THE VISIT.     REASONS FOR FOLLOWUP:  1. 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 NO ANEMIA, NORMAL CREATININE,NORMAL CALCIUM, NO BONE PAIN NO ADENOPATHIES , NORMAL DIFFERENTIAL IN WBC.  2. LEUKOCYTOSIS AND THROMBOCYTOSIS.  3. GOITER VERY LIKELY HASHIMOTO'S THYROIDITIS.  4. SJOGREN'S SYNDROME.      HISTORY OF PRESENT ILLNESS:  Patient is here today 2/24/2023 for lab review and evaluation due for cycle 1 day 23, Darzalex fast pro today.  She continues to complain of ongoing issues with fatigue.  Now that she is only working 40 hours a week, this is significantly helped.  She is taking Revlimid, and her first dose was on Wednesday of last week.  So far she is tolerating it well.  She does complain of difficulty eating due to a bad taste in her mouth.  No significant issues with nausea, vomiting, diarrhea, or constipation.  No neuropathy symptoms.    Past Medical History:   Diagnosis Date   • Achilles tendonitis    • Anemia    • Anxiety    • Arthritis     ankles, shoulders   • Asthma     childhood   • Benign essential hypertension    • Cerebral aneurysm    • GERD (gastroesophageal reflux disease)    • Headache    • History of anemia    • History of E. coli septicemia    • Rotator cuff syndrome    • Seasonal allergies         Past Surgical History:   Procedure Laterality Date   • ABSCESS DRAINAGE  2000    Renal abscess   • CEREBRAL ANGIOGRAM  2020   • CRANIOTOMY  2020    with angiogram for aneurysm   • HYSTERECTOMY  2017    partial, both ovaries remain   • ROTATOR CUFF REPAIR Right 2012   • SHOULDER SURGERY          Current Outpatient Medications on File Prior to Visit   Medication Sig Dispense Refill   • acyclovir (ZOVIRAX) 400 MG tablet Take 1 tablet by mouth 2 (Two) Times a Day. 60  tablet 3   • albuterol sulfate  (90 Base) MCG/ACT inhaler Ventolin HFA 90 mcg/actuation aerosol inhaler     • amitriptyline (ELAVIL) 25 MG tablet      • amLODIPine (NORVASC) 5 MG tablet      • dexamethasone (DECADRON) 4 MG tablet Take 5 tablets by mouth 1 (One) Time Per Week. Take in the morning with food. 20 tablet 3   • Emgality 120 MG/ML auto-injector pen INJECT 1 ML SUBCUTANEOUSLY ONCE EVERY MONTH     • hydrOXYzine (ATARAX) 25 MG tablet      • lenalidomide (REVLIMID) 15 MG capsule Take 1 capsule by mouth Daily. Take for 21 days on then 7 days off 21 capsule 0   • levothyroxine (SYNTHROID, LEVOTHROID) 25 MCG tablet      • Magnesium Oxide 500 MG tablet magnesium oxide 500 mg tablet   TAKE 1 TABLET BY MOUTH NIGHTLY     • metoprolol tartrate (LOPRESSOR) 50 MG tablet Take 50 mg by mouth Every Morning.     • ondansetron (ZOFRAN) 8 MG tablet Take 1 tablet by mouth 3 (Three) Times a Day As Needed for Nausea or Vomiting. 30 tablet 5   • Rimegepant Sulfate (NURTEC) 75 MG tablet dispersible tablet Take 1 tablet by mouth Daily As Needed.     • Rivaroxaban (Xarelto) 2.5 MG tablet Take 1 tablet by mouth Daily. 30 tablet 2   • sulfamethoxazole-trimethoprim (BACTRIM DS,SEPTRA DS) 800-160 MG per tablet Take 1 tablet by mouth 3 (Three) Times a Week. Take 1 tablet on Mondays, Wednesdays and Fridays. 12 tablet 3     No current facility-administered medications on file prior to visit.        ALLERGIES:    Allergies   Allergen Reactions   • Coconut Shortness Of Breath   • Eggs Or Egg-Derived Products Diarrhea        Social History     Socioeconomic History   • Marital status:    • Number of children: 3   Tobacco Use   • Smoking status: Never   • Smokeless tobacco: Never   Substance and Sexual Activity   • Alcohol use: Yes   • Drug use: Never        Family History   Problem Relation Age of Onset   • Hypertension Father    • Multiple sclerosis Brother    • Diabetes Brother    • Colon cancer Paternal Aunt    • Heart  "attack Maternal Grandmother    • Heart disease Maternal Grandmother    • Pancreatic cancer Maternal Grandfather    • Colon cancer Paternal Grandmother    • Heart attack Paternal Grandfather    • Cancer Paternal Grandfather           Objective     Vitals:    02/24/23 1334   BP: 144/87   Pulse: 90   Resp: 16   Temp: 97.3 °F (36.3 °C)   TempSrc: Temporal   SpO2: 97%   Weight: 90 kg (198 lb 6.4 oz)   Height: 165.1 cm (65\")   PainSc:   2   PainLoc: Leg  Comment: Left leg     Current Status 2/24/2023   ECOG score 0       Physical Exam  Vitals reviewed.   Constitutional:       General: She is not in acute distress.     Appearance: Normal appearance. She is well-developed.   HENT:      Head: Normocephalic and atraumatic.   Eyes:      Pupils: Pupils are equal, round, and reactive to light.   Cardiovascular:      Rate and Rhythm: Normal rate and regular rhythm.      Heart sounds: Normal heart sounds. No murmur heard.  Pulmonary:      Effort: Pulmonary effort is normal. No respiratory distress.      Breath sounds: Normal breath sounds. No wheezing, rhonchi or rales.   Abdominal:      General: Bowel sounds are normal. There is no distension.      Palpations: Abdomen is soft.   Musculoskeletal:         General: Normal range of motion.      Cervical back: Normal range of motion.   Skin:     General: Skin is warm and dry.      Findings: No rash.   Neurological:      Mental Status: She is alert and oriented to person, place, and time.             RECENT LABS:  Hematology WBC   Date Value Ref Range Status   02/24/2023 10.18 3.40 - 10.80 10*3/mm3 Final   12/14/2021 12.38 (H) 4.5 - 11.0 10*3/uL Final     RBC   Date Value Ref Range Status   02/24/2023 4.56 3.77 - 5.28 10*6/mm3 Final   12/14/2021 4.24 4.0 - 5.2 10*6/uL Final     Hemoglobin   Date Value Ref Range Status   02/24/2023 12.7 12.0 - 15.9 g/dL Final   12/14/2021 12.2 12.0 - 16.0 g/dL Final     Hematocrit   Date Value Ref Range Status   02/24/2023 40.7 34.0 - 46.6 % Final "   12/14/2021 37.3 36.0 - 46.0 % Final     Platelets   Date Value Ref Range Status   02/24/2023 412 140 - 450 10*3/mm3 Final   12/14/2021 495 (H) 140 - 440 10*3/uL Final       CBC:    WBC   Date Value Ref Range Status   02/24/2023 10.18 3.40 - 10.80 10*3/mm3 Final   12/14/2021 12.38 (H) 4.5 - 11.0 10*3/uL Final     RBC   Date Value Ref Range Status   02/24/2023 4.56 3.77 - 5.28 10*6/mm3 Final   12/14/2021 4.24 4.0 - 5.2 10*6/uL Final     Hemoglobin   Date Value Ref Range Status   02/24/2023 12.7 12.0 - 15.9 g/dL Final   12/14/2021 12.2 12.0 - 16.0 g/dL Final     Hematocrit   Date Value Ref Range Status   02/24/2023 40.7 34.0 - 46.6 % Final   12/14/2021 37.3 36.0 - 46.0 % Final     MCV   Date Value Ref Range Status   02/24/2023 89.3 79.0 - 97.0 fL Final   12/14/2021 88.0 80.0 - 100.0 fL Final     MCH   Date Value Ref Range Status   02/24/2023 27.9 26.6 - 33.0 pg Final   12/14/2021 28.8 26.0 - 34.0 pg Final     MCHC   Date Value Ref Range Status   02/24/2023 31.2 (L) 31.5 - 35.7 g/dL Final   12/14/2021 32.7 31.0 - 37.0 g/dL Final     RDW   Date Value Ref Range Status   02/24/2023 14.7 12.3 - 15.4 % Final   12/14/2021 13.9 12.0 - 16.8 % Final     RDW-SD   Date Value Ref Range Status   02/24/2023 47.6 37.0 - 54.0 fl Final     MPV   Date Value Ref Range Status   02/24/2023 10.0 6.0 - 12.0 fL Final   12/14/2021 9.5 8.4 - 12.4 fL Final     Platelets   Date Value Ref Range Status   02/24/2023 412 140 - 450 10*3/mm3 Final   12/14/2021 495 (H) 140 - 440 10*3/uL Final     Neutrophil Rel %   Date Value Ref Range Status   12/14/2021 63.8 45 - 80 % Final     Neutrophil %   Date Value Ref Range Status   02/24/2023 92.8 (H) 42.7 - 76.0 % Final     Lymphocyte Rel %   Date Value Ref Range Status   12/14/2021 24.4 15 - 50 % Final     Lymphocyte %   Date Value Ref Range Status   02/24/2023 5.6 (L) 19.6 - 45.3 % Final     Monocyte Rel %   Date Value Ref Range Status   12/14/2021 9.0 0 - 15 % Final     Monocyte %   Date Value Ref Range  Status   02/24/2023 1.0 (L) 5.0 - 12.0 % Final     Eosinophil %   Date Value Ref Range Status   02/24/2023 0.0 (L) 0.3 - 6.2 % Final   12/14/2021 1.4 0 - 7 % Final     Basophil Rel %   Date Value Ref Range Status   12/14/2021 0.6 0 - 2 % Final     Basophil %   Date Value Ref Range Status   02/24/2023 0.1 0.0 - 1.5 % Final     Immature Grans %   Date Value Ref Range Status   02/24/2023 0.5 0.0 - 0.5 % Final   12/14/2021 0.8 0.0 - 1.0 % Final     Neutrophils Absolute   Date Value Ref Range Status   12/14/2021 7.89 2.0 - 8.8 10*3/uL Final     Neutrophils, Absolute   Date Value Ref Range Status   02/24/2023 9.45 (H) 1.70 - 7.00 10*3/mm3 Final     Lymphocytes Absolute   Date Value Ref Range Status   12/14/2021 3.02 0.7 - 5.5 10*3/uL Final     Lymphocytes, Absolute   Date Value Ref Range Status   02/24/2023 0.57 (L) 0.70 - 3.10 10*3/mm3 Final     Monocytes Absolute   Date Value Ref Range Status   12/14/2021 1.12 0.0 - 1.7 10*3/uL Final     Monocytes, Absolute   Date Value Ref Range Status   02/24/2023 0.10 0.10 - 0.90 10*3/mm3 Final     Eosinophils Absolute   Date Value Ref Range Status   12/14/2021 0.17 0.0 - 0.8 10*3/uL Final     Eosinophils, Absolute   Date Value Ref Range Status   02/24/2023 0.00 0.00 - 0.40 10*3/mm3 Final     Basophils Absolute   Date Value Ref Range Status   12/14/2021 0.08 0.0 - 0.2 10*3/uL Final     Basophils, Absolute   Date Value Ref Range Status   02/24/2023 0.01 0.00 - 0.20 10*3/mm3 Final     Immature Grans, Absolute   Date Value Ref Range Status   02/24/2023 0.05 0.00 - 0.05 10*3/mm3 Final   12/14/2021 0.10 0.00 - 0.10 10*3/uL Final     nRBC   Date Value Ref Range Status   02/24/2023 0.0 0.0 - 0.2 /100 WBC Final        CMP:    Glucose   Date Value Ref Range Status   02/24/2023 169 (H) 74 - 124 mg/dL Final     BUN   Date Value Ref Range Status   02/24/2023 9 6 - 20 mg/dL Final   08/07/2020 5 (L) 7 - 20 mg/dL Final     Creatinine   Date Value Ref Range Status   02/24/2023 0.70 0.60 - 1.10 mg/dL  Final   08/07/2020 0.6 (L) 0.7 - 1.5 mg/dL Final     Sodium   Date Value Ref Range Status   02/24/2023 138 134 - 145 mmol/L Final   08/07/2020 137 137 - 145 mmol/L Final     Potassium   Date Value Ref Range Status   02/24/2023 4.0 3.5 - 4.7 mmol/L Final   08/07/2020 3.5 3.5 - 5.1 mmol/L Final     Chloride   Date Value Ref Range Status   02/24/2023 100 98 - 107 mmol/L Final   08/07/2020 106 98 - 107 mmol/L Final     CO2   Date Value Ref Range Status   02/24/2023 19.6 (L) 22.0 - 29.0 mmol/L Final     Total CO2   Date Value Ref Range Status   08/07/2020 21 (L) 22 - 30 mmol/L Final     Calcium   Date Value Ref Range Status   02/24/2023 10.2 8.5 - 10.2 mg/dL Final   08/07/2020 8.4 8.4 - 10.2 mg/dL Final     Total Protein   Date Value Ref Range Status   02/24/2023 8.2 (H) 6.3 - 8.0 g/dL Final   08/05/2020 7.5 6.3 - 8.2 g/dL Final     Albumin   Date Value Ref Range Status   02/24/2023 4.6 3.5 - 5.2 g/dL Final   08/05/2020 4.1 3.5 - 5.0 g/dL Final     ALT (SGPT)   Date Value Ref Range Status   02/24/2023 37 (H) 0 - 33 U/L Final   08/05/2020 25 0 - 35 U/L Final     Comment:     If ALT testing ordered prior to 2359 on 11/16/19, please use reference range: Male 0-50, Female 0-35.  [facility] switched to a new ALT assay on 11/16/19 which yields results 10 U/L lower than the old assay.     AST (SGOT)   Date Value Ref Range Status   02/24/2023 32 0 - 32 U/L Final   08/05/2020 39 15 - 46 U/L Final     Alkaline Phosphatase   Date Value Ref Range Status   02/24/2023 83 38 - 116 U/L Final   08/05/2020 65 38 - 126 U/L Final     Total Bilirubin   Date Value Ref Range Status   02/24/2023 0.2 0.2 - 1.2 mg/dL Final   08/05/2020 0.3 0.2 - 1.3 mg/dL Final     Globulin   Date Value Ref Range Status   02/24/2023 3.6 (H) 1.8 - 3.5 gm/dL Final   08/05/2020 3.4 1.5 - 4.5 g/dL Final     A/G Ratio   Date Value Ref Range Status   02/24/2023 1.3 1.1 - 2.4 g/dL Final     BUN/Creatinine Ratio   Date Value Ref Range Status    02/24/2023 12.9 7.3 - 30.0 Final   08/07/2020 8.6 RATIO Final     Anion Gap   Date Value Ref Range Status   02/24/2023 18.4 (H) 5.0 - 15.0 mmol/L Final               Assessment & Plan     Diagnoses and all orders for this visit:    1. Multiple myeloma not having achieved remission (HCC) (Primary)       1.  Multiple Myeloma:    · Referred 11/14/2022 for leukocytosis and thrombocytosis by rheumatology, Dr. Ayala.  Diagnosed with Sjogren's by Dr. Ayala.  · History of anemia when she had a cerebral aneurysm clip done in 2019.  · Patient found to have serum protein electrophoresis that disclosed a monoclonal protein   · IgG monoclonal protein in the 2000 category, Bence-Moon protein in the urine, very small amount that is not quantifiable.  · Recommend patient proceed with bone marrow testing.  · 1/24/2023 bone marrow biopsy  · Bone marrow biopsy showing 18% plasma cells infiltrating in the bone marrow. Also absence of iron stores. The patient had no evidence of myelofibrosis, lymphoma, leukemia, or myelodysplasia.   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 [place] red stain was negative for amyloid deposition  · Recommended treatment with combination of Darzalex once a week, Velcade subcutaneously once a week, 3 weeks out of 4, Revlimid 15 mg a day for 3 weeks out of a month and dexamethasone 20 mg once a week.  · Plan to treat the patient for the next 4 months following her monoclonal protein and eventually referred to [facility] for consideration of high-dose chemotherapy and stem cell transplant.  · No evidence of lytic lesions, therefore will not use Zometa or Xgeva at this time.  · 2/3/2023 cycle 1 Darzalex Faspro and Velcade.  · 02/10/2023 the patient was further reviewed the day that she comes for her 2nd Darzalex infusion and 2nd Velcade injection. So far the patient has not encountered any side effects of the medicines.  · Seen 2/24/2023 due for  cycle 1 day 22.  Patient started her Revlimid 15 mg daily for 3 out of 4 weeks last Wednesday.  So far she is tolerating all of her treatment fairly well without any significant side effects other than ongoing fatigue.    2.  Nodular goiter: she has a normal TSH and a normal T4. I will let Beau Frye MD, the patient's Primary Physician address this issue eventually with an ultrasound and clinical examination. The patient has antithyroid antibody positivity and very likely in my opinion she probably has a component of Hashimoto's thyroiditis.     3.  Prophylaxis: recommend she start low-dose Xarelto 2.5 mg p.o. daily after she has had her molar extracted for initiation and before initiation of her Revlimid administration.     4.  Iron deficiency:  · given her absence of iron stores in her bone marrow she will require IV iron therapy. This could be accommodated at some point between all the infusions and treatments that she needs to have.    5.  Sjogren's syndrome with myopathy:  · Followed by rheumatology      PLAN:  1. Proceed with cycle 1, day 22 Darzalex Faspro today.  2. Continue Revlimid 15 mg daily for 21 out of 28 days.  3. Continue dexamethasone 20 mg weekly.  4. Return in 1 week due to start cycle 2-day 1 Velcade, Darzalex fast pro.  5. Return in 2 weeks for follow-up visit with Dr. Freedman with repeat labs reevaluation due for cycle 2-day 8 Darzalex, Velcade.  6. Continue prophylactic Xarelto 2.5 mg daily.  7. Continue prophylactic acyclovir and Bactrim.  8. Call/ return sooner should he develop any new concerns or problems.      Patient is on a high risk medication requiring close monitoring for toxicity.      Mayra Muñoz, APRN  02/24/2023

## 2023-03-01 ENCOUNTER — OFFICE VISIT (OUTPATIENT)
Dept: PSYCHIATRY | Facility: HOSPITAL | Age: 53
End: 2023-03-01
Payer: COMMERCIAL

## 2023-03-01 DIAGNOSIS — F32.1 CURRENT MODERATE EPISODE OF MAJOR DEPRESSIVE DISORDER WITHOUT PRIOR EPISODE: Primary | ICD-10-CM

## 2023-03-01 PROCEDURE — 99214 OFFICE O/P EST MOD 30 MIN: CPT | Performed by: NURSE PRACTITIONER

## 2023-03-01 NOTE — PROGRESS NOTES
[facility]  In Person Session    Subjective  Patient ID: Yudy Hinton is a 52 y.o. female is seen face to face in the [facility] ([facility].    CC: Situational anxiety, depression surrounding multiple myeloma    HPI/ Interval History:   Pt continues in treatment of multiple myeloma. Identifies continued stress associated with medical bills piling up. Does have cancer policy, although has not been able to fill out paperwork. Pt has spoken with Human Resources team although with little progress. Has initiated conversations, but remains concerned regarding eventual need for BMT, time off.     PHQ 9 remains borderline. Continues to work full time at the [employer], not working overtime. Sees this as being as a manageable strategy. Generally feels mood is stable and appropriate. Does acknowledge she is perceived by others as doing great and continuing as her normal bright and shining self. Pt has been engaging more, staying up later, and finds enjoyment in this. Has not yet contacted [facility] but remains interested. Remains interested in preserving personal norm, not allowing cancer to be her defining trait.    Pt does endorse increase in fatigue on new treatment, improving as of recently. Considers increased appetite with weight increase. Is interested in meeting with [facility] for healthy strategies to manage weight.  Pt denies disruptive nausea or vomitting.     Exam: As above    Recent Labs Reviewed:  Lab on 02/24/2023   Component Date Value   • Glucose 02/24/2023 169 (H)    • BUN 02/24/2023 9    • Creatinine 02/24/2023 0.70    • Sodium 02/24/2023 138    • Potassium 02/24/2023 4.0    • Chloride 02/24/2023 100    • CO2 02/24/2023 19.6 (L)    • Calcium 02/24/2023 10.2    • Total Protein 02/24/2023 8.2 (H)    • Albumin 02/24/2023 4.6    • ALT (SGPT) 02/24/2023 37 (H)    • AST (SGOT) 02/24/2023 32    • Alkaline Phosphatase 02/24/2023 83    • Total Bilirubin  02/24/2023 0.2    • Globulin 02/24/2023 3.6 (H)    • A/G Ratio 02/24/2023 1.3    • BUN/Creatinine Ratio 02/24/2023 12.9    • Anion Gap 02/24/2023 18.4 (H)    • eGFR 02/24/2023 105.5    • WBC 02/24/2023 10.18    • RBC 02/24/2023 4.56    • Hemoglobin 02/24/2023 12.7    • Hematocrit 02/24/2023 40.7    • MCV 02/24/2023 89.3    • MCH 02/24/2023 27.9    • MCHC 02/24/2023 31.2 (L)    • RDW 02/24/2023 14.7    • RDW-SD 02/24/2023 47.6    • MPV 02/24/2023 10.0    • Platelets 02/24/2023 412    • Neutrophil % 02/24/2023 92.8 (H)    • Lymphocyte % 02/24/2023 5.6 (L)    • Monocyte % 02/24/2023 1.0 (L)    • Eosinophil % 02/24/2023 0.0 (L)    • Basophil % 02/24/2023 0.1    • Immature Grans % 02/24/2023 0.5    • Neutrophils, Absolute 02/24/2023 9.45 (H)    • Lymphocytes, Absolute 02/24/2023 0.57 (L)    • Monocytes, Absolute 02/24/2023 0.10    • Eosinophils, Absolute 02/24/2023 0.00    • Basophils, Absolute 02/24/2023 0.01    • Immature Grans, Absolute 02/24/2023 0.05    • nRBC 02/24/2023 0.0    Lab on 02/17/2023   Component Date Value   • Glucose 02/17/2023 105    • BUN 02/17/2023 12    • Creatinine 02/17/2023 1.03    • Sodium 02/17/2023 137    • Potassium 02/17/2023 4.7    • Chloride 02/17/2023 104    • CO2 02/17/2023 20.6 (L)    • Calcium 02/17/2023 10.0    • Total Protein 02/17/2023 7.8    • Albumin 02/17/2023 4.4    • ALT (SGPT) 02/17/2023 22    • AST (SGOT) 02/17/2023 17    • Alkaline Phosphatase 02/17/2023 78    • Total Bilirubin 02/17/2023 <0.2 (L)    • Globulin 02/17/2023 3.4    • A/G Ratio 02/17/2023 1.3    • BUN/Creatinine Ratio 02/17/2023 11.7    • Anion Gap 02/17/2023 12.4    • eGFR 02/17/2023 66.4    • WBC 02/17/2023 12.91 (H)    • RBC 02/17/2023 4.35    • Hemoglobin 02/17/2023 12.5    • Hematocrit 02/17/2023 38.3    • MCV 02/17/2023 88.0    • MCH 02/17/2023 28.7    • MCHC 02/17/2023 32.6    • RDW 02/17/2023 14.5    • RDW-SD 02/17/2023 46.0    • MPV 02/17/2023 9.3    • Platelets 02/17/2023 444    • Neutrophil %  02/17/2023 82.1 (H)    • Lymphocyte % 02/17/2023 10.6 (L)    • Monocyte % 02/17/2023 4.7 (L)    • Eosinophil % 02/17/2023 1.5    • Basophil % 02/17/2023 0.2    • Immature Grans % 02/17/2023 0.9 (H)    • Neutrophils, Absolute 02/17/2023 10.60 (H)    • Lymphocytes, Absolute 02/17/2023 1.37    • Monocytes, Absolute 02/17/2023 0.61    • Eosinophils, Absolute 02/17/2023 0.19    • Basophils, Absolute 02/17/2023 0.03    • Immature Grans, Absolute 02/17/2023 0.11 (H)    • nRBC 02/17/2023 0.0    Infusion on 02/10/2023   Component Date Value   • Glucose 02/10/2023 110    • BUN 02/10/2023 15    • Creatinine 02/10/2023 0.72    • Sodium 02/10/2023 135    • Potassium 02/10/2023 4.2    • Chloride 02/10/2023 103    • CO2 02/10/2023 20.3 (L)    • Calcium 02/10/2023 9.3    • Total Protein 02/10/2023 8.2 (H)    • Albumin 02/10/2023 4.3    • ALT (SGPT) 02/10/2023 20    • AST (SGOT) 02/10/2023 21    • Alkaline Phosphatase 02/10/2023 84    • Total Bilirubin 02/10/2023 <0.2 (L)    • Globulin 02/10/2023 3.9 (H)    • A/G Ratio 02/10/2023 1.1    • BUN/Creatinine Ratio 02/10/2023 20.8    • Anion Gap 02/10/2023 11.7    • eGFR 02/10/2023 102.0    • WBC 02/10/2023 10.79    • RBC 02/10/2023 4.35    • Hemoglobin 02/10/2023 12.6    • Hematocrit 02/10/2023 38.2    • MCV 02/10/2023 87.8    • MCH 02/10/2023 29.0    • MCHC 02/10/2023 33.0    • RDW 02/10/2023 14.0    • RDW-SD 02/10/2023 45.4    • MPV 02/10/2023 9.2    • Platelets 02/10/2023 435    • Neutrophil % 02/10/2023 77.9 (H)    • Lymphocyte % 02/10/2023 14.1 (L)    • Monocyte % 02/10/2023 5.6    • Eosinophil % 02/10/2023 1.5    • Basophil % 02/10/2023 0.3    • Immature Grans % 02/10/2023 0.6 (H)    • Neutrophils, Absolute 02/10/2023 8.42 (H)    • Lymphocytes, Absolute 02/10/2023 1.52    • Monocytes, Absolute 02/10/2023 0.60    • Eosinophils, Absolute 02/10/2023 0.16    • Basophils, Absolute 02/10/2023 0.03    • Immature Grans, Absolute 02/10/2023 0.06 (H)    • nRBC 02/10/2023 0.0    Infusion on  02/03/2023   Component Date Value   • Glucose 02/03/2023 142 (H)    • BUN 02/03/2023 13    • Creatinine 02/03/2023 0.92    • Sodium 02/03/2023 136    • Potassium 02/03/2023 3.8    • Chloride 02/03/2023 100    • CO2 02/03/2023 20.3 (L)    • Calcium 02/03/2023 9.8    • Total Protein 02/03/2023 9.3 (H)    • Albumin 02/03/2023 4.7    • ALT (SGPT) 02/03/2023 24    • AST (SGOT) 02/03/2023 31    • Alkaline Phosphatase 02/03/2023 84    • Total Bilirubin 02/03/2023 0.2    • Globulin 02/03/2023 4.6 (H)    • A/G Ratio 02/03/2023 1.0 (L)    • BUN/Creatinine Ratio 02/03/2023 14.1    • Anion Gap 02/03/2023 15.7 (H)    • eGFR 02/03/2023 76.0    • HCG, Urine QL 02/03/2023 Negative    • WBC 02/03/2023 11.43 (H)    • RBC 02/03/2023 4.61    • Hemoglobin 02/03/2023 13.3    • Hematocrit 02/03/2023 40.4    • MCV 02/03/2023 87.6    • MCH 02/03/2023 28.9    • MCHC 02/03/2023 32.9    • RDW 02/03/2023 13.9    • RDW-SD 02/03/2023 44.2    • MPV 02/03/2023 9.4    • Platelets 02/03/2023 541 (H)    • Neutrophil % 02/03/2023 88.1 (H)    • Lymphocyte % 02/03/2023 9.5 (L)    • Monocyte % 02/03/2023 1.0 (L)    • Eosinophil % 02/03/2023 0.1 (L)    • Basophil % 02/03/2023 0.3    • Immature Grans % 02/03/2023 1.0 (H)    • Neutrophils, Absolute 02/03/2023 10.06 (H)    • Lymphocytes, Absolute 02/03/2023 1.09    • Monocytes, Absolute 02/03/2023 0.12    • Eosinophils, Absolute 02/03/2023 0.01    • Basophils, Absolute 02/03/2023 0.03    • Immature Grans, Absolute 02/03/2023 0.12 (H)    • nRBC 02/03/2023 0.0    • ABO Type 02/03/2023 O    • RH type 02/03/2023 Positive    • Antibody Screen 02/03/2023 Negative    • T&S Expiration Date 02/03/2023 2/4/2023 11:59:00 PM    • K antigen 02/03/2023 Negative    Lab on 01/18/2023   Component Date Value   • Glucose 01/18/2023 100    • BUN 01/18/2023 17    • Creatinine 01/18/2023 0.85    • Sodium 01/18/2023 138    • Potassium 01/18/2023 4.0    • Chloride 01/18/2023 104    • CO2 01/18/2023 21.6 (L)    • Calcium 01/18/2023  9.6    • Total Protein 01/18/2023 8.4 (H)    • Albumin 01/18/2023 4.2    • ALT (SGPT) 01/18/2023 12    • AST (SGOT) 01/18/2023 15    • Alkaline Phosphatase 01/18/2023 75    • Total Bilirubin 01/18/2023 0.3    • Globulin 01/18/2023 4.2 (H)    • A/G Ratio 01/18/2023 1.0 (L)    • BUN/Creatinine Ratio 01/18/2023 20.0    • Anion Gap 01/18/2023 12.4    • eGFR 01/18/2023 83.6    • IgG 01/18/2023 2357 (H)    • IgA 01/18/2023 180    • IgM 01/18/2023 70    • Total Protein 01/18/2023 8.2    • Albumin 01/18/2023 4.0    • Alpha-1-Globulin 01/18/2023 0.2    • Alpha-2-Globulin 01/18/2023 0.9    • Beta Globulin 01/18/2023 1.1    • Gamma Globulin 01/18/2023 2.0 (H)    • M-Alvarez 01/18/2023 1.2 (H)    • Globulin 01/18/2023 4.2 (H)    • A/G Ratio 01/18/2023 1.0    • Immunofixation Reflex, S* 01/18/2023 Comment (A)    • Please note 01/18/2023 Comment    • Free Light Chain, Kappa 01/18/2023 46.0 (H)    • Free Lambda Light Chains 01/18/2023 15.4    • Kappa/Lambda Ratio 01/18/2023 2.99 (H)    • WBC 01/18/2023 11.31 (H)    • RBC 01/18/2023 4.45    • Hemoglobin 01/18/2023 12.6    • Hematocrit 01/18/2023 39.6    • MCV 01/18/2023 89.0    • MCH 01/18/2023 28.3    • MCHC 01/18/2023 31.8    • RDW 01/18/2023 13.9    • RDW-SD 01/18/2023 45.2    • MPV 01/18/2023 9.3    • Platelets 01/18/2023 447    • Neutrophil % 01/18/2023 64.8    • Lymphocyte % 01/18/2023 25.6    • Monocyte % 01/18/2023 7.3    • Eosinophil % 01/18/2023 1.2    • Basophil % 01/18/2023 0.5    • Immature Grans % 01/18/2023 0.6 (H)    • Neutrophils, Absolute 01/18/2023 7.32 (H)    • Lymphocytes, Absolute 01/18/2023 2.90    • Monocytes, Absolute 01/18/2023 0.82    • Eosinophils, Absolute 01/18/2023 0.14    • Basophils, Absolute 01/18/2023 0.06    • Immature Grans, Absolute 01/18/2023 0.07 (H)    • nRBC 01/18/2023 0.0    Hospital Outpatient Visit on 01/04/2023   Component Date Value   • WBC 01/04/2023 9.24    • RBC 01/04/2023 4.21    • Hemoglobin 01/04/2023 11.9 (L)    • Hematocrit  01/04/2023 35.1    • MCV 01/04/2023 83.4    • MCH 01/04/2023 28.3    • MCHC 01/04/2023 33.9    • RDW 01/04/2023 13.3    • RDW-SD 01/04/2023 41.1    • MPV 01/04/2023 9.5    • Platelets 01/04/2023 448    • Neutrophil % 01/04/2023 54.2    • Lymphocyte % 01/04/2023 30.6    • Monocyte % 01/04/2023 9.8    • Eosinophil % 01/04/2023 4.3    • Basophil % 01/04/2023 0.5    • Immature Grans % 01/04/2023 0.6 (H)    • Neutrophils, Absolute 01/04/2023 4.99    • Lymphocytes, Absolute 01/04/2023 2.83    • Monocytes, Absolute 01/04/2023 0.91 (H)    • Eosinophils, Absolute 01/04/2023 0.40    • Basophils, Absolute 01/04/2023 0.05    • Immature Grans, Absolute 01/04/2023 0.06 (H)    • nRBC 01/04/2023 0.0    • Addendum 01/04/2023                      Value:This result contains rich text formatting which cannot be displayed here.   • Case Report 01/04/2023                      Value:Surgical Pathology Report                         Case: [id number]                                  Authorizing Provider:  Estuardo Freedman MD        Collected:           01/04/2023 09:32 AM          Ordering Location:     [facility]  Received:            01/04/2023 09:49 AM                                 CT                                                                           Pathologist:           Kelvin Medellin MD                                                         Specimens:   1) - Iliac Crest, Right - Biopsy, bone marrow bx                                                    2) - Iliac Crest, Right - Aspirate, 4 smears and 1 CB                                               3) - Iliac Crest, Right - Aspirate, 2 green top tubes for cytogenetics and 1 purple                 top tube for flow cytometry sent to [facility]                                                   • Clinical Information 01/04/2023                      Value:This result contains rich text formatting which cannot be displayed here.   • Final Diagnosis 01/04/2023                       Value:This result contains rich text formatting which cannot be displayed here.   • Preliminary Diagnosis 01/04/2023                      Value:This result contains rich text formatting which cannot be displayed here.   • Comment 01/04/2023                      Value:This result contains rich text formatting which cannot be displayed here.   • Gross Description 01/04/2023                      Value:This result contains rich text formatting which cannot be displayed here.   labs reviewed    Current Psychotropic Medications:  No current psychotropic medications    Plan of Care/ Medical Decision Making  Pt with stable sx of anxiety and depression alongside Multiple Myeloma.  Continued to encourage patient in assertive communications.   Supportive nonpharmacological interventions at this time, as desired by patient. Briefly reviewed potential strategies for depression mgmt, specifically includive of wellbutrin. Will not initiate at this time.  Refer patient to social work team to assist with disability, cancer policy, and other financial concerns.  Refer patient to Sophia Gomes, oncology dietitian, to explore healthy eating and weight mgmt alongside dx and tx.    Diagnoses and all orders for this visit:    1. Current moderate episode of major depressive disorder without prior episode (HCC) (Primary)    I spent 36 minutes caring for Yudy on this date of service.

## 2023-03-02 ENCOUNTER — TELEPHONE (OUTPATIENT)
Dept: OTHER | Facility: HOSPITAL | Age: 53
End: 2023-03-02
Payer: COMMERCIAL

## 2023-03-02 NOTE — TELEPHONE ENCOUNTER
Oncology Social Work    Patient is needing assistance with some paperwork for her insurance. OSW and patient will meet in person before her treatment Friday to review. Patient agreed via telephone to the appointment.    Heidi CASTILLO

## 2023-03-03 ENCOUNTER — INFUSION (OUTPATIENT)
Dept: ONCOLOGY | Facility: HOSPITAL | Age: 53
End: 2023-03-03
Payer: COMMERCIAL

## 2023-03-03 ENCOUNTER — LAB (OUTPATIENT)
Dept: LAB | Facility: HOSPITAL | Age: 53
End: 2023-03-03
Payer: COMMERCIAL

## 2023-03-03 VITALS
OXYGEN SATURATION: 94 % | BODY MASS INDEX: 32.82 KG/M2 | WEIGHT: 197.2 LBS | TEMPERATURE: 98.6 F | RESPIRATION RATE: 16 BRPM | HEART RATE: 71 BPM | SYSTOLIC BLOOD PRESSURE: 136 MMHG | DIASTOLIC BLOOD PRESSURE: 75 MMHG

## 2023-03-03 DIAGNOSIS — C90.00 MULTIPLE MYELOMA NOT HAVING ACHIEVED REMISSION: ICD-10-CM

## 2023-03-03 DIAGNOSIS — C90.00 MULTIPLE MYELOMA NOT HAVING ACHIEVED REMISSION: Primary | ICD-10-CM

## 2023-03-03 LAB
ALBUMIN SERPL-MCNC: 4.5 G/DL (ref 3.5–5.2)
ALBUMIN/GLOB SERPL: 1.4 G/DL (ref 1.1–2.4)
ALP SERPL-CCNC: 77 U/L (ref 38–116)
ALT SERPL W P-5'-P-CCNC: 22 U/L (ref 0–33)
ANION GAP SERPL CALCULATED.3IONS-SCNC: 14.6 MMOL/L (ref 5–15)
AST SERPL-CCNC: 19 U/L (ref 0–32)
BASOPHILS # BLD AUTO: 0.02 10*3/MM3 (ref 0–0.2)
BASOPHILS NFR BLD AUTO: 0.2 % (ref 0–1.5)
BILIRUB SERPL-MCNC: 0.3 MG/DL (ref 0.2–1.2)
BUN SERPL-MCNC: 9 MG/DL (ref 6–20)
BUN/CREAT SERPL: 11.5 (ref 7.3–30)
CALCIUM SPEC-SCNC: 9.7 MG/DL (ref 8.5–10.2)
CHLORIDE SERPL-SCNC: 101 MMOL/L (ref 98–107)
CO2 SERPL-SCNC: 22.4 MMOL/L (ref 22–29)
CREAT SERPL-MCNC: 0.78 MG/DL (ref 0.6–1.1)
DEPRECATED RDW RBC AUTO: 47.8 FL (ref 37–54)
EGFRCR SERPLBLD CKD-EPI 2021: 92.7 ML/MIN/1.73
EOSINOPHIL # BLD AUTO: 0.04 10*3/MM3 (ref 0–0.4)
EOSINOPHIL NFR BLD AUTO: 0.5 % (ref 0.3–6.2)
ERYTHROCYTE [DISTWIDTH] IN BLOOD BY AUTOMATED COUNT: 14.8 % (ref 12.3–15.4)
GLOBULIN UR ELPH-MCNC: 3.3 GM/DL (ref 1.8–3.5)
GLUCOSE SERPL-MCNC: 106 MG/DL (ref 74–124)
HCT VFR BLD AUTO: 39.5 % (ref 34–46.6)
HGB BLD-MCNC: 12.8 G/DL (ref 12–15.9)
IMM GRANULOCYTES # BLD AUTO: 0.03 10*3/MM3 (ref 0–0.05)
IMM GRANULOCYTES NFR BLD AUTO: 0.4 % (ref 0–0.5)
LYMPHOCYTES # BLD AUTO: 0.93 10*3/MM3 (ref 0.7–3.1)
LYMPHOCYTES NFR BLD AUTO: 11.6 % (ref 19.6–45.3)
MCH RBC QN AUTO: 28.8 PG (ref 26.6–33)
MCHC RBC AUTO-ENTMCNC: 32.4 G/DL (ref 31.5–35.7)
MCV RBC AUTO: 88.8 FL (ref 79–97)
MONOCYTES # BLD AUTO: 0.37 10*3/MM3 (ref 0.1–0.9)
MONOCYTES NFR BLD AUTO: 4.6 % (ref 5–12)
NEUTROPHILS NFR BLD AUTO: 6.64 10*3/MM3 (ref 1.7–7)
NEUTROPHILS NFR BLD AUTO: 82.7 % (ref 42.7–76)
NRBC BLD AUTO-RTO: 0 /100 WBC (ref 0–0.2)
PLATELET # BLD AUTO: 331 10*3/MM3 (ref 140–450)
PMV BLD AUTO: 9.6 FL (ref 6–12)
POTASSIUM SERPL-SCNC: 4.1 MMOL/L (ref 3.5–4.7)
PROT SERPL-MCNC: 7.8 G/DL (ref 6.3–8)
RBC # BLD AUTO: 4.45 10*6/MM3 (ref 3.77–5.28)
SODIUM SERPL-SCNC: 138 MMOL/L (ref 134–145)
WBC NRBC COR # BLD: 8.03 10*3/MM3 (ref 3.4–10.8)

## 2023-03-03 PROCEDURE — 63710000001 DIPHENHYDRAMINE PER 50 MG: Performed by: NURSE PRACTITIONER

## 2023-03-03 PROCEDURE — 85025 COMPLETE CBC W/AUTO DIFF WBC: CPT

## 2023-03-03 PROCEDURE — 80053 COMPREHEN METABOLIC PANEL: CPT

## 2023-03-03 PROCEDURE — 25010000002 BORTEZOMIB PER 0.1 MG: Performed by: NURSE PRACTITIONER

## 2023-03-03 PROCEDURE — 25010000002 DARATUMUMAB-HYALURONIDASE-FIHJ 1800-30000 MG-UT/15ML SOLUTION: Performed by: NURSE PRACTITIONER

## 2023-03-03 PROCEDURE — 36415 COLL VENOUS BLD VENIPUNCTURE: CPT

## 2023-03-03 PROCEDURE — 96401 CHEMO ANTI-NEOPL SQ/IM: CPT

## 2023-03-03 RX ORDER — FAMOTIDINE 20 MG/1
20 TABLET, FILM COATED ORAL
Status: DISCONTINUED | OUTPATIENT
Start: 2023-03-03 | End: 2023-03-03 | Stop reason: HOSPADM

## 2023-03-03 RX ORDER — DIPHENHYDRAMINE HYDROCHLORIDE 50 MG/ML
50 INJECTION INTRAMUSCULAR; INTRAVENOUS AS NEEDED
Status: CANCELLED | OUTPATIENT
Start: 2023-03-03

## 2023-03-03 RX ORDER — DIPHENHYDRAMINE HCL 25 MG
25 CAPSULE ORAL ONCE
Status: COMPLETED | OUTPATIENT
Start: 2023-03-03 | End: 2023-03-03

## 2023-03-03 RX ORDER — SODIUM CHLORIDE 9 MG/ML
250 INJECTION, SOLUTION INTRAVENOUS ONCE
Status: CANCELLED | OUTPATIENT
Start: 2023-03-03

## 2023-03-03 RX ORDER — ACETAMINOPHEN 500 MG
1000 TABLET ORAL ONCE
Status: COMPLETED | OUTPATIENT
Start: 2023-03-03 | End: 2023-03-03

## 2023-03-03 RX ORDER — FAMOTIDINE 10 MG/ML
20 INJECTION, SOLUTION INTRAVENOUS AS NEEDED
Status: CANCELLED | OUTPATIENT
Start: 2023-03-03

## 2023-03-03 RX ORDER — BORTEZOMIB 3.5 MG/1
1.3 INJECTION, POWDER, LYOPHILIZED, FOR SOLUTION INTRAVENOUS; SUBCUTANEOUS ONCE
Status: COMPLETED | OUTPATIENT
Start: 2023-03-03 | End: 2023-03-03

## 2023-03-03 RX ADMIN — DARATUMUMAB AND HYALURONIDASE-FIHJ (HUMAN RECOMBINANT) 1800 MG: 1800; 30000 INJECTION SUBCUTANEOUS at 14:29

## 2023-03-03 RX ADMIN — FAMOTIDINE 20 MG: 20 TABLET ORAL at 13:42

## 2023-03-03 RX ADMIN — ACETAMINOPHEN 1000 MG: 500 TABLET, FILM COATED ORAL at 13:42

## 2023-03-03 RX ADMIN — BORTEZOMIB 2.5 MG: 3.5 INJECTION, POWDER, LYOPHILIZED, FOR SOLUTION INTRAVENOUS; SUBCUTANEOUS at 14:28

## 2023-03-03 RX ADMIN — DIPHENHYDRAMINE HYDROCHLORIDE 25 MG: 25 CAPSULE ORAL at 13:42

## 2023-03-07 ENCOUNTER — SPECIALTY PHARMACY (OUTPATIENT)
Dept: PHARMACY | Facility: HOSPITAL | Age: 53
End: 2023-03-07
Payer: COMMERCIAL

## 2023-03-07 DIAGNOSIS — C90.00 MULTIPLE MYELOMA NOT HAVING ACHIEVED REMISSION: ICD-10-CM

## 2023-03-07 RX ORDER — LENALIDOMIDE 15 MG/1
CAPSULE ORAL
Qty: 21 CAPSULE | Refills: 0 | Status: SHIPPED | OUTPATIENT
Start: 2023-03-07 | End: 2023-03-31

## 2023-03-07 NOTE — PROGRESS NOTES
Re: Refills of Oral Specialty Medication - Revlimid (lenalidomide)    • Drug-Drug Interactions: The current medication list was reviewed and there are no relevant drug-drug interactions.  • Medication Allergies: The patient has no relevant allergies as it relates to their oral specialty medication  • Review of Labs/Dose Adjustments: NO DOSE CHANGE - I reviewed the most recent note and labs and the patient will continue without any dose changes.  I sent refills as described below.    Drug: Revlimid (lenalidomide)  Strength: 15 mg  Directions: Take one capsule by mouth daily for 21 days on then 7 days off.   Quantity: 21  Refills: 0  auth # [id number]  Pharmacy prescription sent to: [facility]    Name/Credentials: Marianna Mays, TristanD, BCPS     3/7/2023  10:43 EST     Completed independent double check on medication order/RX. Dinorah Hood, RP, BCOP

## 2023-03-10 ENCOUNTER — LAB (OUTPATIENT)
Dept: LAB | Facility: HOSPITAL | Age: 53
End: 2023-03-10
Payer: COMMERCIAL

## 2023-03-10 ENCOUNTER — OFFICE VISIT (OUTPATIENT)
Dept: ONCOLOGY | Facility: CLINIC | Age: 53
End: 2023-03-10
Payer: COMMERCIAL

## 2023-03-10 ENCOUNTER — INFUSION (OUTPATIENT)
Dept: ONCOLOGY | Facility: HOSPITAL | Age: 53
End: 2023-03-10
Payer: COMMERCIAL

## 2023-03-10 VITALS
SYSTOLIC BLOOD PRESSURE: 147 MMHG | RESPIRATION RATE: 16 BRPM | HEIGHT: 65 IN | TEMPERATURE: 97.8 F | DIASTOLIC BLOOD PRESSURE: 84 MMHG | HEART RATE: 75 BPM | OXYGEN SATURATION: 98 % | WEIGHT: 197.9 LBS | BODY MASS INDEX: 32.97 KG/M2

## 2023-03-10 DIAGNOSIS — C90.00 MULTIPLE MYELOMA NOT HAVING ACHIEVED REMISSION: Primary | ICD-10-CM

## 2023-03-10 DIAGNOSIS — C90.00 MULTIPLE MYELOMA NOT HAVING ACHIEVED REMISSION: ICD-10-CM

## 2023-03-10 LAB
BASOPHILS # BLD AUTO: 0.02 10*3/MM3 (ref 0–0.2)
BASOPHILS NFR BLD AUTO: 0.3 % (ref 0–1.5)
DEPRECATED RDW RBC AUTO: 47 FL (ref 37–54)
EOSINOPHIL # BLD AUTO: 0.12 10*3/MM3 (ref 0–0.4)
EOSINOPHIL NFR BLD AUTO: 1.7 % (ref 0.3–6.2)
ERYTHROCYTE [DISTWIDTH] IN BLOOD BY AUTOMATED COUNT: 14.8 % (ref 12.3–15.4)
HCT VFR BLD AUTO: 37.6 % (ref 34–46.6)
HGB BLD-MCNC: 12.5 G/DL (ref 12–15.9)
IMM GRANULOCYTES # BLD AUTO: 0.05 10*3/MM3 (ref 0–0.05)
IMM GRANULOCYTES NFR BLD AUTO: 0.7 % (ref 0–0.5)
LYMPHOCYTES # BLD AUTO: 1.98 10*3/MM3 (ref 0.7–3.1)
LYMPHOCYTES NFR BLD AUTO: 27.3 % (ref 19.6–45.3)
MCH RBC QN AUTO: 28.9 PG (ref 26.6–33)
MCHC RBC AUTO-ENTMCNC: 33.2 G/DL (ref 31.5–35.7)
MCV RBC AUTO: 86.8 FL (ref 79–97)
MONOCYTES # BLD AUTO: 0.67 10*3/MM3 (ref 0.1–0.9)
MONOCYTES NFR BLD AUTO: 9.3 % (ref 5–12)
NEUTROPHILS NFR BLD AUTO: 4.4 10*3/MM3 (ref 1.7–7)
NEUTROPHILS NFR BLD AUTO: 60.7 % (ref 42.7–76)
NRBC BLD AUTO-RTO: 0 /100 WBC (ref 0–0.2)
PLATELET # BLD AUTO: 394 10*3/MM3 (ref 140–450)
PMV BLD AUTO: 10 FL (ref 6–12)
RBC # BLD AUTO: 4.33 10*6/MM3 (ref 3.77–5.28)
WBC NRBC COR # BLD: 7.24 10*3/MM3 (ref 3.4–10.8)

## 2023-03-10 PROCEDURE — 96401 CHEMO ANTI-NEOPL SQ/IM: CPT

## 2023-03-10 PROCEDURE — 99214 OFFICE O/P EST MOD 30 MIN: CPT | Performed by: NURSE PRACTITIONER

## 2023-03-10 PROCEDURE — 25010000002 DARATUMUMAB-HYALURONIDASE-FIHJ 1800-30000 MG-UT/15ML SOLUTION: Performed by: NURSE PRACTITIONER

## 2023-03-10 PROCEDURE — 63710000001 DIPHENHYDRAMINE PER 50 MG: Performed by: NURSE PRACTITIONER

## 2023-03-10 PROCEDURE — 85025 COMPLETE CBC W/AUTO DIFF WBC: CPT

## 2023-03-10 PROCEDURE — 25010000002 BORTEZOMIB PER 0.1 MG: Performed by: NURSE PRACTITIONER

## 2023-03-10 PROCEDURE — 36415 COLL VENOUS BLD VENIPUNCTURE: CPT

## 2023-03-10 RX ORDER — SODIUM CHLORIDE 9 MG/ML
250 INJECTION, SOLUTION INTRAVENOUS ONCE
Status: DISCONTINUED | OUTPATIENT
Start: 2023-03-10 | End: 2023-03-10 | Stop reason: HOSPADM

## 2023-03-10 RX ORDER — SODIUM CHLORIDE 9 MG/ML
250 INJECTION, SOLUTION INTRAVENOUS ONCE
Status: CANCELLED | OUTPATIENT
Start: 2023-03-10

## 2023-03-10 RX ORDER — FAMOTIDINE 20 MG/1
20 TABLET, FILM COATED ORAL
Status: DISCONTINUED | OUTPATIENT
Start: 2023-03-10 | End: 2023-03-10 | Stop reason: HOSPADM

## 2023-03-10 RX ORDER — FAMOTIDINE 10 MG/ML
20 INJECTION, SOLUTION INTRAVENOUS AS NEEDED
Status: CANCELLED | OUTPATIENT
Start: 2023-03-10

## 2023-03-10 RX ORDER — DIPHENHYDRAMINE HCL 25 MG
25 CAPSULE ORAL ONCE
Status: COMPLETED | OUTPATIENT
Start: 2023-03-10 | End: 2023-03-10

## 2023-03-10 RX ORDER — ACETAMINOPHEN 500 MG
1000 TABLET ORAL ONCE
Status: COMPLETED | OUTPATIENT
Start: 2023-03-10 | End: 2023-03-10

## 2023-03-10 RX ORDER — ACETAMINOPHEN 500 MG
1000 TABLET ORAL ONCE
Status: CANCELLED | OUTPATIENT
Start: 2023-03-10

## 2023-03-10 RX ORDER — BORTEZOMIB 3.5 MG/1
1.3 INJECTION, POWDER, LYOPHILIZED, FOR SOLUTION INTRAVENOUS; SUBCUTANEOUS ONCE
Status: CANCELLED | OUTPATIENT
Start: 2023-03-10

## 2023-03-10 RX ORDER — DIPHENHYDRAMINE HYDROCHLORIDE 50 MG/ML
50 INJECTION INTRAMUSCULAR; INTRAVENOUS AS NEEDED
Status: CANCELLED | OUTPATIENT
Start: 2023-03-10

## 2023-03-10 RX ORDER — BORTEZOMIB 3.5 MG/1
1.3 INJECTION, POWDER, LYOPHILIZED, FOR SOLUTION INTRAVENOUS; SUBCUTANEOUS ONCE
Status: COMPLETED | OUTPATIENT
Start: 2023-03-10 | End: 2023-03-10

## 2023-03-10 RX ORDER — MEPERIDINE HYDROCHLORIDE 25 MG/ML
25 INJECTION INTRAMUSCULAR; INTRAVENOUS; SUBCUTANEOUS
Status: CANCELLED | OUTPATIENT
Start: 2023-03-10

## 2023-03-10 RX ORDER — FAMOTIDINE 20 MG/1
20 TABLET, FILM COATED ORAL
Status: CANCELLED
Start: 2023-03-10

## 2023-03-10 RX ORDER — DIPHENHYDRAMINE HCL 25 MG
25 CAPSULE ORAL ONCE
Status: CANCELLED | OUTPATIENT
Start: 2023-03-10

## 2023-03-10 RX ADMIN — BORTEZOMIB 2.5 MG: 3.5 INJECTION, POWDER, LYOPHILIZED, FOR SOLUTION INTRAVENOUS; SUBCUTANEOUS at 15:44

## 2023-03-10 RX ADMIN — ACETAMINOPHEN 1000 MG: 500 TABLET, FILM COATED ORAL at 14:56

## 2023-03-10 RX ADMIN — DIPHENHYDRAMINE HYDROCHLORIDE 25 MG: 25 CAPSULE ORAL at 14:56

## 2023-03-10 RX ADMIN — DARATUMUMAB AND HYALURONIDASE-FIHJ (HUMAN RECOMBINANT) 1800 MG: 1800; 30000 INJECTION SUBCUTANEOUS at 15:44

## 2023-03-10 RX ADMIN — FAMOTIDINE 20 MG: 20 TABLET ORAL at 14:56

## 2023-03-10 NOTE — PROGRESS NOTES
Subjective        REASONS FOR FOLLOWUP:    1. 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 NO ANEMIA, NORMAL CREATININE,NORMAL CALCIUM, NO BONE PAIN NO ADENOPATHIES , NORMAL DIFFERENTIAL IN WBC.  2. LEUKOCYTOSIS AND THROMBOCYTOSIS.  3. GOITER VERY LIKELY HASHIMOTO'S THYROIDITIS.  4. SJOGREN'S SYNDROME.      HISTORY OF PRESENT ILLNESS:  Patient is here today for lab review and evaluation prior to cycle 2-day 8 Darzalex fast pro, Velcade.  She also continues on Revlimid 15 mg for 21 out of 28 days.  She also continues on dexamethasone 20 mg weekly.  She does report feeling a little bit more fatigued today.  She continues to struggle with her appetite and taste changes.  She states that she has a metallic taste in her mouth.  She is also complaining of being hot all the time now, when she previously used to be cold all the time.    Past Medical History:   Diagnosis Date   • Achilles tendonitis    • Anemia    • Anxiety    • Arthritis     ankles, shoulders   • Asthma     childhood   • Benign essential hypertension    • Cerebral aneurysm    • GERD (gastroesophageal reflux disease)    • Headache    • History of anemia    • History of E. coli septicemia    • Multiple myeloma (HCC) 2023   • Rotator cuff syndrome    • Seasonal allergies         Past Surgical History:   Procedure Laterality Date   • ABSCESS DRAINAGE  2000    Renal abscess   • CEREBRAL ANGIOGRAM  2020   • CRANIOTOMY  2020    with angiogram for aneurysm   • HYSTERECTOMY  2017    partial, both ovaries remain   • ROTATOR CUFF REPAIR Right 2012   • SHOULDER SURGERY          Current Outpatient Medications on File Prior to Visit   Medication Sig Dispense Refill   • acyclovir (ZOVIRAX) 400 MG tablet Take 1 tablet by mouth 2 (Two) Times a Day. 60 tablet 3   • albuterol sulfate  (90 Base) MCG/ACT inhaler Ventolin HFA 90 mcg/actuation aerosol inhaler     • amitriptyline (ELAVIL) 25 MG tablet      • amLODIPine (NORVASC) 5 MG tablet      •  dexamethasone (DECADRON) 4 MG tablet Take 5 tablets by mouth 1 (One) Time Per Week. Take in the morning with food. 20 tablet 3   • Emgality 120 MG/ML auto-injector pen INJECT 1 ML SUBCUTANEOUSLY ONCE EVERY MONTH     • hydrOXYzine (ATARAX) 25 MG tablet      • lenalidomide (Revlimid) 15 MG capsule TAKE 1 CAPSULE BY MOUTH ONCE DAILY FOR 21 DAYS ON AND 7 DAYS OFF 21 capsule 0   • levothyroxine (SYNTHROID, LEVOTHROID) 25 MCG tablet      • Magnesium Oxide 500 MG tablet magnesium oxide 500 mg tablet   TAKE 1 TABLET BY MOUTH NIGHTLY     • metoprolol tartrate (LOPRESSOR) 50 MG tablet Take 1 tablet by mouth Every Morning.     • ondansetron (ZOFRAN) 8 MG tablet Take 1 tablet by mouth 3 (Three) Times a Day As Needed for Nausea or Vomiting. 30 tablet 5   • Rimegepant Sulfate (NURTEC) 75 MG tablet dispersible tablet Take 1 tablet by mouth Daily As Needed.     • Rivaroxaban (Xarelto) 2.5 MG tablet Take 1 tablet by mouth Daily. 30 tablet 2   • sulfamethoxazole-trimethoprim (BACTRIM DS,SEPTRA DS) 800-160 MG per tablet Take 1 tablet by mouth 3 (Three) Times a Week. Take 1 tablet on Mondays, Wednesdays and Fridays. 12 tablet 3     No current facility-administered medications on file prior to visit.        ALLERGIES:    Allergies   Allergen Reactions   • Coconut Shortness Of Breath   • Eggs Or Egg-Derived Products Diarrhea        Social History     Socioeconomic History   • Marital status:    • Number of children: 3   Tobacco Use   • Smoking status: Never   • Smokeless tobacco: Never   Substance and Sexual Activity   • Alcohol use: Yes   • Drug use: Never        Family History   Problem Relation Age of Onset   • Hypertension Father    • Multiple sclerosis Brother    • Diabetes Brother    • Colon cancer Paternal Aunt    • Heart attack Maternal Grandmother    • Heart disease Maternal Grandmother    • Pancreatic cancer Maternal Grandfather    • Colon cancer Paternal Grandmother    • Heart attack Paternal Grandfather    • Cancer  "Paternal Grandfather           Objective     Vitals:    03/10/23 1422   BP: 147/84   Pulse: 75   Resp: 16   Temp: 97.8 °F (36.6 °C)   TempSrc: Temporal   SpO2: 98%   Weight: 89.8 kg (197 lb 14.4 oz)   Height: 165.1 cm (65\")   PainSc: 0-No pain     Current Status 3/10/2023   ECOG score 0       Physical Exam  Vitals reviewed.   Constitutional:       General: She is not in acute distress.     Appearance: Normal appearance. She is well-developed.   HENT:      Head: Normocephalic and atraumatic.   Eyes:      Pupils: Pupils are equal, round, and reactive to light.   Cardiovascular:      Rate and Rhythm: Normal rate and regular rhythm.      Heart sounds: Normal heart sounds. No murmur heard.  Pulmonary:      Effort: Pulmonary effort is normal. No respiratory distress.      Breath sounds: Normal breath sounds. No wheezing, rhonchi or rales.   Abdominal:      General: Bowel sounds are normal. There is no distension.      Palpations: Abdomen is soft.   Musculoskeletal:         General: Normal range of motion.      Cervical back: Normal range of motion.   Skin:     General: Skin is warm and dry.      Findings: No rash.   Neurological:      Mental Status: She is alert and oriented to person, place, and time.           RECENT LABS:  Hematology WBC   Date Value Ref Range Status   03/10/2023 7.24 3.40 - 10.80 10*3/mm3 Final   12/14/2021 12.38 (H) 4.5 - 11.0 10*3/uL Final     RBC   Date Value Ref Range Status   03/10/2023 4.33 3.77 - 5.28 10*6/mm3 Final   12/14/2021 4.24 4.0 - 5.2 10*6/uL Final     Hemoglobin   Date Value Ref Range Status   03/10/2023 12.5 12.0 - 15.9 g/dL Final   12/14/2021 12.2 12.0 - 16.0 g/dL Final     Hematocrit   Date Value Ref Range Status   03/10/2023 37.6 34.0 - 46.6 % Final   12/14/2021 37.3 36.0 - 46.0 % Final     Platelets   Date Value Ref Range Status   03/10/2023 394 140 - 450 10*3/mm3 Final   12/14/2021 495 (H) 140 - 440 10*3/uL Final       CBC:    WBC   Date Value Ref Range Status   03/10/2023 7.24 " 3.40 - 10.80 10*3/mm3 Final   12/14/2021 12.38 (H) 4.5 - 11.0 10*3/uL Final     RBC   Date Value Ref Range Status   03/10/2023 4.33 3.77 - 5.28 10*6/mm3 Final   12/14/2021 4.24 4.0 - 5.2 10*6/uL Final     Hemoglobin   Date Value Ref Range Status   03/10/2023 12.5 12.0 - 15.9 g/dL Final   12/14/2021 12.2 12.0 - 16.0 g/dL Final     Hematocrit   Date Value Ref Range Status   03/10/2023 37.6 34.0 - 46.6 % Final   12/14/2021 37.3 36.0 - 46.0 % Final     MCV   Date Value Ref Range Status   03/10/2023 86.8 79.0 - 97.0 fL Final   12/14/2021 88.0 80.0 - 100.0 fL Final     MCH   Date Value Ref Range Status   03/10/2023 28.9 26.6 - 33.0 pg Final   12/14/2021 28.8 26.0 - 34.0 pg Final     MCHC   Date Value Ref Range Status   03/10/2023 33.2 31.5 - 35.7 g/dL Final   12/14/2021 32.7 31.0 - 37.0 g/dL Final     RDW   Date Value Ref Range Status   03/10/2023 14.8 12.3 - 15.4 % Final   12/14/2021 13.9 12.0 - 16.8 % Final     RDW-SD   Date Value Ref Range Status   03/10/2023 47.0 37.0 - 54.0 fl Final     MPV   Date Value Ref Range Status   03/10/2023 10.0 6.0 - 12.0 fL Final   12/14/2021 9.5 8.4 - 12.4 fL Final     Platelets   Date Value Ref Range Status   03/10/2023 394 140 - 450 10*3/mm3 Final   12/14/2021 495 (H) 140 - 440 10*3/uL Final     Neutrophil Rel %   Date Value Ref Range Status   12/14/2021 63.8 45 - 80 % Final     Neutrophil %   Date Value Ref Range Status   03/10/2023 60.7 42.7 - 76.0 % Final     Lymphocyte Rel %   Date Value Ref Range Status   12/14/2021 24.4 15 - 50 % Final     Lymphocyte %   Date Value Ref Range Status   03/10/2023 27.3 19.6 - 45.3 % Final     Monocyte Rel %   Date Value Ref Range Status   12/14/2021 9.0 0 - 15 % Final     Monocyte %   Date Value Ref Range Status   03/10/2023 9.3 5.0 - 12.0 % Final     Eosinophil %   Date Value Ref Range Status   03/10/2023 1.7 0.3 - 6.2 % Final   12/14/2021 1.4 0 - 7 % Final     Basophil Rel %   Date Value Ref Range Status   12/14/2021 0.6 0 - 2 % Final     Basophil  %   Date Value Ref Range Status   03/10/2023 0.3 0.0 - 1.5 % Final     Immature Grans %   Date Value Ref Range Status   03/10/2023 0.7 (H) 0.0 - 0.5 % Final   12/14/2021 0.8 0.0 - 1.0 % Final     Neutrophils Absolute   Date Value Ref Range Status   12/14/2021 7.89 2.0 - 8.8 10*3/uL Final     Neutrophils, Absolute   Date Value Ref Range Status   03/10/2023 4.40 1.70 - 7.00 10*3/mm3 Final     Lymphocytes Absolute   Date Value Ref Range Status   12/14/2021 3.02 0.7 - 5.5 10*3/uL Final     Lymphocytes, Absolute   Date Value Ref Range Status   03/10/2023 1.98 0.70 - 3.10 10*3/mm3 Final     Monocytes Absolute   Date Value Ref Range Status   12/14/2021 1.12 0.0 - 1.7 10*3/uL Final     Monocytes, Absolute   Date Value Ref Range Status   03/10/2023 0.67 0.10 - 0.90 10*3/mm3 Final     Eosinophils Absolute   Date Value Ref Range Status   12/14/2021 0.17 0.0 - 0.8 10*3/uL Final     Eosinophils, Absolute   Date Value Ref Range Status   03/10/2023 0.12 0.00 - 0.40 10*3/mm3 Final     Basophils Absolute   Date Value Ref Range Status   12/14/2021 0.08 0.0 - 0.2 10*3/uL Final     Basophils, Absolute   Date Value Ref Range Status   03/10/2023 0.02 0.00 - 0.20 10*3/mm3 Final     Immature Grans, Absolute   Date Value Ref Range Status   03/10/2023 0.05 0.00 - 0.05 10*3/mm3 Final   12/14/2021 0.10 0.00 - 0.10 10*3/uL Final     nRBC   Date Value Ref Range Status   03/10/2023 0.0 0.0 - 0.2 /100 WBC Final        CMP:    Glucose   Date Value Ref Range Status   03/03/2023 106 74 - 124 mg/dL Final     BUN   Date Value Ref Range Status   03/03/2023 9 6 - 20 mg/dL Final   08/07/2020 5 (L) 7 - 20 mg/dL Final     Creatinine   Date Value Ref Range Status   03/03/2023 0.78 0.60 - 1.10 mg/dL Final   08/07/2020 0.6 (L) 0.7 - 1.5 mg/dL Final     Sodium   Date Value Ref Range Status   03/03/2023 138 134 - 145 mmol/L Final   08/07/2020 137 137 - 145 mmol/L Final     Potassium   Date Value Ref Range Status   03/03/2023 4.1 3.5 - 4.7 mmol/L Final    08/07/2020 3.5 3.5 - 5.1 mmol/L Final     Chloride   Date Value Ref Range Status   03/03/2023 101 98 - 107 mmol/L Final   08/07/2020 106 98 - 107 mmol/L Final     CO2   Date Value Ref Range Status   03/03/2023 22.4 22.0 - 29.0 mmol/L Final     Total CO2   Date Value Ref Range Status   08/07/2020 21 (L) 22 - 30 mmol/L Final     Calcium   Date Value Ref Range Status   03/03/2023 9.7 8.5 - 10.2 mg/dL Final   08/07/2020 8.4 8.4 - 10.2 mg/dL Final     Total Protein   Date Value Ref Range Status   03/03/2023 7.8 6.3 - 8.0 g/dL Final   08/05/2020 7.5 6.3 - 8.2 g/dL Final     Albumin   Date Value Ref Range Status   03/03/2023 4.5 3.5 - 5.2 g/dL Final   08/05/2020 4.1 3.5 - 5.0 g/dL Final     ALT (SGPT)   Date Value Ref Range Status   03/03/2023 22 0 - 33 U/L Final   08/05/2020 25 0 - 35 U/L Final     Comment:     If ALT testing ordered prior to 2359 on 11/16/19, please use reference range: Male 0-50, Female 0-35.  [facility] switched to a new ALT assay on 11/16/19 which yields results 10 U/L lower than the old assay.     AST (SGOT)   Date Value Ref Range Status   03/03/2023 19 0 - 32 U/L Final   08/05/2020 39 15 - 46 U/L Final     Alkaline Phosphatase   Date Value Ref Range Status   03/03/2023 77 38 - 116 U/L Final   08/05/2020 65 38 - 126 U/L Final     Total Bilirubin   Date Value Ref Range Status   03/03/2023 0.3 0.2 - 1.2 mg/dL Final   08/05/2020 0.3 0.2 - 1.3 mg/dL Final     Globulin   Date Value Ref Range Status   03/03/2023 3.3 1.8 - 3.5 gm/dL Final   08/05/2020 3.4 1.5 - 4.5 g/dL Final     A/G Ratio   Date Value Ref Range Status   03/03/2023 1.4 1.1 - 2.4 g/dL Final     BUN/Creatinine Ratio   Date Value Ref Range Status   03/03/2023 11.5 7.3 - 30.0 Final   08/07/2020 8.6 RATIO Final     Anion Gap   Date Value Ref Range Status   03/03/2023 14.6 5.0 - 15.0 mmol/L Final               Assessment & Plan     Diagnoses and all orders for this visit:    1. Multiple myeloma not having achieved remission  (HCC) (Primary)  -     Cancel: sodium chloride 0.9 % infusion 250 mL  -     Cancel: famotidine (PEPCID) tablet 20 mg  -     Cancel: acetaminophen (TYLENOL) tablet 1,000 mg  -     Cancel: diphenhydrAMINE (BENADRYL) capsule 25 mg  -     Cancel: bortezomib (VELCADE) chemo injection 2.5 mg  -     Cancel: daratumumab-hyaluronidase-fihj (DARZALEX FASPRO) [id number] MG-UT/15ML injection 1,800 mg  -     SUNNY, PE & Free LT Chains, Ser    Other orders  -     Hydrocortisone Sod Suc (PF) (Solu-CORTEF) injection 100 mg  -     diphenhydrAMINE (BENADRYL) injection 50 mg  -     famotidine (PEPCID) injection 20 mg  -     meperidine (DEMEROL) injection 25 mg       1.  Multiple Myeloma:    · Referred 11/14/2022 for leukocytosis and thrombocytosis by rheumatology, Dr. Ayala.  Diagnosed with Sjogren's by Dr. Ayala.  · History of anemia when she had a cerebral aneurysm clip done in 2019.  · Patient found to have serum protein electrophoresis that disclosed a monoclonal protein   · IgG monoclonal protein in the 2000 category, Bence-Moon protein in the urine, very small amount that is not quantifiable.  · Recommend patient proceed with bone marrow testing.  · 1/24/2023 bone marrow biopsy  · Bone marrow biopsy showing 18% plasma cells infiltrating in the bone marrow. Also absence of iron stores. The patient had no evidence of myelofibrosis, lymphoma, leukemia, or myelodysplasia.   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 [place] red stain was negative for amyloid deposition  · Recommended treatment with combination of Darzalex once a week, Velcade subcutaneously once a week, 3 weeks out of 4, Revlimid 15 mg a day for 3 weeks out of a month and dexamethasone 20 mg once a week.  · Plan to treat the patient for the next 4 months following her monoclonal protein and eventually referred to [facility] for consideration of high-dose chemotherapy and stem cell transplant.  · No  evidence of lytic lesions, therefore will not use Zometa or Xgeva at this time.  · 2/3/2023 cycle 1 Darzalex Faspro and Velcade.  · 02/10/2023 the patient was further reviewed the day that she comes for her 2nd Darzalex infusion and 2nd Velcade injection. So far the patient has not encountered any side effects of the medicines.  · Seen 2/24/2023 due for cycle 1 day 22.  Patient started her Revlimid 15 mg daily for 3 out of 4 weeks last Wednesday.  So far she is tolerating all of her treatment fairly well without any significant side effects other than ongoing fatigue.  · 3/10/2023 here for cycle 2, day 8 Velcade, Darzalex fast pro, continuing on Revlimid 15 mg daily for 3 weeks on, 1 week off as well as dexamethasone 20 mg weekly.  We will recheck paraprotein studies today.    2.  Nodular goiter: she has a normal TSH and a normal T4. I will let Beau Frye MD, the patient's Primary Physician address this issue eventually with an ultrasound and clinical examination. The patient has antithyroid antibody positivity and very likely in my opinion she probably has a component of Hashimoto's thyroiditis.     3.  Prophylaxis: recommend she start low-dose Xarelto 2.5 mg p.o. daily after she has had her molar extracted for initiation and before initiation of her Revlimid administration.     4.  Iron deficiency:  · given her absence of iron stores in her bone marrow she will require IV iron therapy. This could be accommodated at some point between all the infusions and treatments that she needs to have.    5.  Sjogren's syndrome with myopathy:  · Followed by rheumatology      PLAN:  1. Proceed with cycle 2, day 8 Velcade and Darzalex today.  2. Continue Revlimid 15 mg daily for 21 out of 28 days.  3. Continue dexamethasone 20 mg weekly.  4. Continue prophylactic acyclovir and Bactrim.  5. Continue prophylactic Xarelto 2.5 mg daily.  6. Recheck serum paraprotein studies today.  7. Return in 1 week for follow-up visit with   Tano with repeat labs reevaluation and consideration of cycle 2-day 15 Velcade and Darzalex.    8. Call/ return sooner should she develop any new concerns or problems.    Patient is on a high risk medication requiring close monitoring for toxicity.      Mayra Muñoz, APRN  03/10/2023

## 2023-03-13 ENCOUNTER — TELEPHONE (OUTPATIENT)
Dept: OTHER | Facility: HOSPITAL | Age: 53
End: 2023-03-13
Payer: COMMERCIAL

## 2023-03-13 NOTE — TELEPHONE ENCOUNTER
Oncology Social Work    OSW reached out to patient via telephone. OSW provided the patient an update on her insurance policy and patient was thankful for the update. OSW again briefly reviewed services/ resources that are available to the patient. Patient voiced understanding.   No additional follow-up needed at this time.     Heidi CASTILLO

## 2023-03-14 LAB
ALBUMIN SERPL ELPH-MCNC: 3.7 G/DL (ref 2.9–4.4)
ALBUMIN/GLOB SERPL: 1 {RATIO} (ref 0.7–1.7)
ALPHA1 GLOB SERPL ELPH-MCNC: 0.3 G/DL (ref 0–0.4)
ALPHA2 GLOB SERPL ELPH-MCNC: 1.1 G/DL (ref 0.4–1)
B-GLOBULIN SERPL ELPH-MCNC: 1.1 G/DL (ref 0.7–1.3)
GAMMA GLOB SERPL ELPH-MCNC: 1.4 G/DL (ref 0.4–1.8)
GLOBULIN SER-MCNC: 3.8 G/DL (ref 2.2–3.9)
IGA SERPL-MCNC: 74 MG/DL (ref 87–352)
IGG SERPL-MCNC: 1330 MG/DL (ref 586–1602)
IGM SERPL-MCNC: 47 MG/DL (ref 26–217)
INTERPRETATION SERPL IEP-IMP: ABNORMAL
KAPPA LC FREE SER-MCNC: 22.5 MG/L (ref 3.3–19.4)
KAPPA LC FREE/LAMBDA FREE SER: 1.74 {RATIO} (ref 0.26–1.65)
LABORATORY COMMENT REPORT: ABNORMAL
LAMBDA LC FREE SERPL-MCNC: 12.9 MG/L (ref 5.7–26.3)
M PROTEIN SERPL ELPH-MCNC: 0.7 G/DL
PROT SERPL-MCNC: 7.5 G/DL (ref 6–8.5)

## 2023-03-15 ENCOUNTER — TELEPHONE (OUTPATIENT)
Dept: PSYCHIATRY | Facility: HOSPITAL | Age: 53
End: 2023-03-15
Payer: COMMERCIAL

## 2023-03-15 NOTE — TELEPHONE ENCOUNTER
Supportive Oncology Services    Call received from pt reporting night terrors, increasing in frequency and now persisting multiple times a night. Considered historical medical trauma, current experience. BP stable.    Considered potential benefit to prazosin for PTSD related nightmares. Prazosin 1-2 mg q hs has been ordered. Pt has follow up arranged in clinic in 2 weeks, and agrees to keep this apt for consideration of response/ additional needs. Potential SE reviewed, including sitting and standing slowly.

## 2023-03-17 ENCOUNTER — INFUSION (OUTPATIENT)
Dept: ONCOLOGY | Facility: HOSPITAL | Age: 53
End: 2023-03-17
Payer: COMMERCIAL

## 2023-03-17 ENCOUNTER — OFFICE VISIT (OUTPATIENT)
Dept: ONCOLOGY | Facility: CLINIC | Age: 53
End: 2023-03-17
Payer: COMMERCIAL

## 2023-03-17 ENCOUNTER — LAB (OUTPATIENT)
Dept: LAB | Facility: HOSPITAL | Age: 53
End: 2023-03-17
Payer: COMMERCIAL

## 2023-03-17 VITALS
WEIGHT: 193 LBS | SYSTOLIC BLOOD PRESSURE: 147 MMHG | TEMPERATURE: 97.5 F | HEIGHT: 65 IN | HEART RATE: 70 BPM | RESPIRATION RATE: 18 BRPM | BODY MASS INDEX: 32.15 KG/M2 | DIASTOLIC BLOOD PRESSURE: 80 MMHG | OXYGEN SATURATION: 100 %

## 2023-03-17 DIAGNOSIS — M79.10 MUSCLE PAIN: ICD-10-CM

## 2023-03-17 DIAGNOSIS — C90.00 MULTIPLE MYELOMA NOT HAVING ACHIEVED REMISSION: Primary | ICD-10-CM

## 2023-03-17 DIAGNOSIS — C90.00 MULTIPLE MYELOMA NOT HAVING ACHIEVED REMISSION: ICD-10-CM

## 2023-03-17 DIAGNOSIS — E04.9 GOITER, NODULAR: ICD-10-CM

## 2023-03-17 LAB
ALBUMIN SERPL-MCNC: 4.5 G/DL (ref 3.5–5.2)
ALBUMIN/GLOB SERPL: 1.4 G/DL (ref 1.1–2.4)
ALP SERPL-CCNC: 73 U/L (ref 38–116)
ALT SERPL W P-5'-P-CCNC: 20 U/L (ref 0–33)
ANION GAP SERPL CALCULATED.3IONS-SCNC: 15 MMOL/L (ref 5–15)
AST SERPL-CCNC: 18 U/L (ref 0–32)
BASOPHILS # BLD AUTO: 0.03 10*3/MM3 (ref 0–0.2)
BASOPHILS NFR BLD AUTO: 0.4 % (ref 0–1.5)
BILIRUB SERPL-MCNC: 0.2 MG/DL (ref 0.2–1.2)
BUN SERPL-MCNC: 14 MG/DL (ref 6–20)
BUN/CREAT SERPL: 16.9 (ref 7.3–30)
CALCIUM SPEC-SCNC: 9.4 MG/DL (ref 8.5–10.2)
CHLORIDE SERPL-SCNC: 106 MMOL/L (ref 98–107)
CO2 SERPL-SCNC: 21 MMOL/L (ref 22–29)
CREAT SERPL-MCNC: 0.83 MG/DL (ref 0.6–1.1)
DEPRECATED RDW RBC AUTO: 50 FL (ref 37–54)
EGFRCR SERPLBLD CKD-EPI 2021: 86 ML/MIN/1.73
EOSINOPHIL # BLD AUTO: 0.04 10*3/MM3 (ref 0–0.4)
EOSINOPHIL NFR BLD AUTO: 0.5 % (ref 0.3–6.2)
ERYTHROCYTE [DISTWIDTH] IN BLOOD BY AUTOMATED COUNT: 15 % (ref 12.3–15.4)
GLOBULIN UR ELPH-MCNC: 3.2 GM/DL (ref 1.8–3.5)
GLUCOSE SERPL-MCNC: 106 MG/DL (ref 74–124)
HCT VFR BLD AUTO: 39.8 % (ref 34–46.6)
HGB BLD-MCNC: 12.3 G/DL (ref 12–15.9)
IMM GRANULOCYTES # BLD AUTO: 0.05 10*3/MM3 (ref 0–0.05)
IMM GRANULOCYTES NFR BLD AUTO: 0.6 % (ref 0–0.5)
LYMPHOCYTES # BLD AUTO: 3.17 10*3/MM3 (ref 0.7–3.1)
LYMPHOCYTES NFR BLD AUTO: 37.7 % (ref 19.6–45.3)
MCH RBC QN AUTO: 28.1 PG (ref 26.6–33)
MCHC RBC AUTO-ENTMCNC: 30.9 G/DL (ref 31.5–35.7)
MCV RBC AUTO: 90.9 FL (ref 79–97)
MONOCYTES # BLD AUTO: 1.05 10*3/MM3 (ref 0.1–0.9)
MONOCYTES NFR BLD AUTO: 12.5 % (ref 5–12)
NEUTROPHILS NFR BLD AUTO: 4.06 10*3/MM3 (ref 1.7–7)
NEUTROPHILS NFR BLD AUTO: 48.3 % (ref 42.7–76)
NRBC BLD AUTO-RTO: 0 /100 WBC (ref 0–0.2)
PLATELET # BLD AUTO: 544 10*3/MM3 (ref 140–450)
PMV BLD AUTO: 9.6 FL (ref 6–12)
POTASSIUM SERPL-SCNC: 3.8 MMOL/L (ref 3.5–4.7)
PROT SERPL-MCNC: 7.7 G/DL (ref 6.3–8)
RBC # BLD AUTO: 4.38 10*6/MM3 (ref 3.77–5.28)
SODIUM SERPL-SCNC: 142 MMOL/L (ref 134–145)
WBC NRBC COR # BLD: 8.4 10*3/MM3 (ref 3.4–10.8)

## 2023-03-17 PROCEDURE — 36415 COLL VENOUS BLD VENIPUNCTURE: CPT

## 2023-03-17 PROCEDURE — 96401 CHEMO ANTI-NEOPL SQ/IM: CPT

## 2023-03-17 PROCEDURE — 80053 COMPREHEN METABOLIC PANEL: CPT

## 2023-03-17 PROCEDURE — 25010000002 BORTEZOMIB PER 0.1 MG: Performed by: INTERNAL MEDICINE

## 2023-03-17 PROCEDURE — 85025 COMPLETE CBC W/AUTO DIFF WBC: CPT

## 2023-03-17 PROCEDURE — 63710000001 DIPHENHYDRAMINE PER 50 MG: Performed by: INTERNAL MEDICINE

## 2023-03-17 PROCEDURE — 25010000002 DARATUMUMAB-HYALURONIDASE-FIHJ 1800-30000 MG-UT/15ML SOLUTION: Performed by: INTERNAL MEDICINE

## 2023-03-17 PROCEDURE — 99214 OFFICE O/P EST MOD 30 MIN: CPT | Performed by: INTERNAL MEDICINE

## 2023-03-17 RX ORDER — BORTEZOMIB 3.5 MG/1
1.3 INJECTION, POWDER, LYOPHILIZED, FOR SOLUTION INTRAVENOUS; SUBCUTANEOUS ONCE
Status: CANCELLED | OUTPATIENT
Start: 2023-03-17

## 2023-03-17 RX ORDER — FAMOTIDINE 10 MG/ML
20 INJECTION, SOLUTION INTRAVENOUS AS NEEDED
Status: CANCELLED | OUTPATIENT
Start: 2023-03-17

## 2023-03-17 RX ORDER — DIPHENHYDRAMINE HYDROCHLORIDE 50 MG/ML
50 INJECTION INTRAMUSCULAR; INTRAVENOUS AS NEEDED
Status: CANCELLED | OUTPATIENT
Start: 2023-03-17

## 2023-03-17 RX ORDER — SODIUM CHLORIDE 9 MG/ML
250 INJECTION, SOLUTION INTRAVENOUS ONCE
Status: CANCELLED | OUTPATIENT
Start: 2023-03-17

## 2023-03-17 RX ORDER — OLANZAPINE 2.5 MG/1
2.5 TABLET ORAL NIGHTLY
Qty: 30 TABLET | Refills: 3 | Status: SHIPPED | OUTPATIENT
Start: 2023-03-17

## 2023-03-17 RX ORDER — MEPERIDINE HYDROCHLORIDE 25 MG/ML
25 INJECTION INTRAMUSCULAR; INTRAVENOUS; SUBCUTANEOUS
Status: CANCELLED | OUTPATIENT
Start: 2023-03-17

## 2023-03-17 RX ORDER — DIPHENHYDRAMINE HCL 25 MG
25 CAPSULE ORAL ONCE
Status: CANCELLED | OUTPATIENT
Start: 2023-03-17

## 2023-03-17 RX ORDER — ACETAMINOPHEN 500 MG
1000 TABLET ORAL ONCE
Status: COMPLETED | OUTPATIENT
Start: 2023-03-17 | End: 2023-03-17

## 2023-03-17 RX ORDER — BORTEZOMIB 3.5 MG/1
1.3 INJECTION, POWDER, LYOPHILIZED, FOR SOLUTION INTRAVENOUS; SUBCUTANEOUS ONCE
Status: COMPLETED | OUTPATIENT
Start: 2023-03-17 | End: 2023-03-17

## 2023-03-17 RX ORDER — ACETAMINOPHEN 500 MG
1000 TABLET ORAL ONCE
Status: CANCELLED | OUTPATIENT
Start: 2023-03-17

## 2023-03-17 RX ORDER — SODIUM CHLORIDE 9 MG/ML
250 INJECTION, SOLUTION INTRAVENOUS ONCE
Status: DISCONTINUED | OUTPATIENT
Start: 2023-03-17 | End: 2023-03-17 | Stop reason: HOSPADM

## 2023-03-17 RX ORDER — DIPHENHYDRAMINE HCL 25 MG
25 CAPSULE ORAL ONCE
Status: COMPLETED | OUTPATIENT
Start: 2023-03-17 | End: 2023-03-17

## 2023-03-17 RX ORDER — FAMOTIDINE 20 MG/1
20 TABLET, FILM COATED ORAL ONCE
Status: COMPLETED | OUTPATIENT
Start: 2023-03-17 | End: 2023-03-17

## 2023-03-17 RX ORDER — FAMOTIDINE 20 MG/1
20 TABLET, FILM COATED ORAL
Status: CANCELLED
Start: 2023-03-17

## 2023-03-17 RX ADMIN — DIPHENHYDRAMINE HYDROCHLORIDE 25 MG: 25 CAPSULE ORAL at 08:59

## 2023-03-17 RX ADMIN — ACETAMINOPHEN 1000 MG: 500 TABLET, FILM COATED ORAL at 08:59

## 2023-03-17 RX ADMIN — DARATUMUMAB AND HYALURONIDASE-FIHJ (HUMAN RECOMBINANT) 1800 MG: 1800; 30000 INJECTION SUBCUTANEOUS at 09:47

## 2023-03-17 RX ADMIN — FAMOTIDINE 20 MG: 20 TABLET ORAL at 08:59

## 2023-03-17 RX ADMIN — BORTEZOMIB 2.5 MG: 3.5 INJECTION, POWDER, LYOPHILIZED, FOR SOLUTION INTRAVENOUS; SUBCUTANEOUS at 09:54

## 2023-03-17 NOTE — PROGRESS NOTES
Subjective        REASONS FOR FOLLOWUP:    1. 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 NO ANEMIA, NORMAL CREATININE,NORMAL CALCIUM, NO BONE PAIN NO ADENOPATHIES , NORMAL DIFFERENTIAL IN WBC.  2. LEUKOCYTOSIS AND THROMBOCYTOSIS.  3. GOITER VERY LIKELY HASHIMOTO'S THYROIDITIS.  4. SJOGREN'S SYNDROME.      HISTORY OF PRESENT ILLNESS:    On 03/17/2023 this 52-year-old female who was diagnosed with multiple myeloma undergoing therapy with Darzalex, Velcade, Revlimid and dexamethasone returns to the office for follow-up. In the interim of time she is very happy because all the aches and pains that she was experiencing in her lower extremities have almost completely resolved. She still has an element of fatigue through. Tolerance to the medicines has been excellent and she has not developed any obvious side effects of Velcade like diarrhea or neuropathy or thrombocytopenia. She has not encountered any problems with the Darzalex or the Revlimid or the dexamethasone.     Her appetite is good. She has early satiety and she is learning to eat several small portions a day. Her weight is stable. Bowel activity is appropriate. Urination is appropriate. She has not had any symptoms to suggest blood clots. She remains on a low-dose Xarelto 2.5 mg a day with no clinical bleeding. This is used for the purpose of minimizing the potentiality for thrombosis associated with Revlimid and myeloma interaction.     She has not had any infections. She has not had any other cancer-related pain. She has not had any clinical bleeding.        Past Medical History:   Diagnosis Date   • Achilles tendonitis    • Anemia    • Anxiety    • Arthritis     ankles, shoulders   • Asthma     childhood   • Benign essential hypertension    • Cerebral aneurysm    • GERD (gastroesophageal reflux disease)    • Headache    • History of anemia    • History of E. coli septicemia    • Multiple myeloma (HCC) 2023   • Rotator cuff  syndrome    • Seasonal allergies         Past Surgical History:   Procedure Laterality Date   • ABSCESS DRAINAGE  2000    Renal abscess   • CEREBRAL ANGIOGRAM  2020   • CRANIOTOMY  2020    with angiogram for aneurysm   • HYSTERECTOMY  2017    partial, both ovaries remain   • ROTATOR CUFF REPAIR Right 2012   • SHOULDER SURGERY          Current Outpatient Medications on File Prior to Visit   Medication Sig Dispense Refill   • acyclovir (ZOVIRAX) 400 MG tablet Take 1 tablet by mouth 2 (Two) Times a Day. 60 tablet 3   • albuterol sulfate  (90 Base) MCG/ACT inhaler Ventolin HFA 90 mcg/actuation aerosol inhaler     • amitriptyline (ELAVIL) 25 MG tablet      • amLODIPine (NORVASC) 5 MG tablet      • dexamethasone (DECADRON) 4 MG tablet Take 5 tablets by mouth 1 (One) Time Per Week. Take in the morning with food. 20 tablet 3   • Emgality 120 MG/ML auto-injector pen INJECT 1 ML SUBCUTANEOUSLY ONCE EVERY MONTH     • hydrOXYzine (ATARAX) 25 MG tablet      • lenalidomide (Revlimid) 15 MG capsule TAKE 1 CAPSULE BY MOUTH ONCE DAILY FOR 21 DAYS ON AND 7 DAYS OFF 21 capsule 0   • levothyroxine (SYNTHROID, LEVOTHROID) 25 MCG tablet      • Magnesium Oxide 500 MG tablet magnesium oxide 500 mg tablet   TAKE 1 TABLET BY MOUTH NIGHTLY     • metoprolol tartrate (LOPRESSOR) 50 MG tablet Take 1 tablet by mouth Every Morning.     • ondansetron (ZOFRAN) 8 MG tablet Take 1 tablet by mouth 3 (Three) Times a Day As Needed for Nausea or Vomiting. 30 tablet 5   • Rimegepant Sulfate (NURTEC) 75 MG tablet dispersible tablet Take 1 tablet by mouth Daily As Needed.     • Rivaroxaban (Xarelto) 2.5 MG tablet Take 1 tablet by mouth Daily. 30 tablet 2   • sulfamethoxazole-trimethoprim (BACTRIM DS,SEPTRA DS) 800-160 MG per tablet Take 1 tablet by mouth 3 (Three) Times a Week. Take 1 tablet on Mondays, Wednesdays and Fridays. 12 tablet 3     No current facility-administered medications on file prior to visit.        ALLERGIES:    Allergies   Allergen  "Reactions   • Coconut Shortness Of Breath   • Eggs Or Egg-Derived Products Diarrhea        Social History     Socioeconomic History   • Marital status:    • Number of children: 3   Tobacco Use   • Smoking status: Never   • Smokeless tobacco: Never   Substance and Sexual Activity   • Alcohol use: Yes   • Drug use: Never        Family History   Problem Relation Age of Onset   • Hypertension Father    • Multiple sclerosis Brother    • Diabetes Brother    • Colon cancer Paternal Aunt    • Heart attack Maternal Grandmother    • Heart disease Maternal Grandmother    • Pancreatic cancer Maternal Grandfather    • Colon cancer Paternal Grandmother    • Heart attack Paternal Grandfather    • Cancer Paternal Grandfather           Objective     Vitals:    03/17/23 0823   BP: 147/80   Pulse: 70   Resp: 18   Temp: 97.5 °F (36.4 °C)   TempSrc: Temporal   SpO2: 100%   Weight: 87.5 kg (193 lb)   Height: 165.1 cm (65\")   PainSc: 0-No pain     Current Status 3/17/2023   ECOG score 0     EXAM:       GENERAL:  Well-developed, Patient  in no acute distress.   SKIN:  Warm, dry ,NO purpura ,no rash.  HEENT:  Pupils were equal and reactive to light and accomodation, conjunctivae noninjected,  normal visual acuity.   NECK:  Supple with good range of motion; nodular thyromegaly , no JVD or bruits,.No carotid artery pain, no carotid abnormal pulsation   LYMPHATICS:  No cervical, NO supraclavicular, NO axillary, NO inguinal adenopathies.  CARDIAC   normal rate , regular rhythm, without murmur,NO rubs NO S3 NO S4   LUNGS: normal breath sounds bilateral, no wheezing, NO rhonchi, NO crackles ,NO rubs.  VASCULAR VENOUS: no cyanosis, NO collateral circulation, NO varicosities, NO edema, NO palpable cords, NO pain,NO erythema, NO pigmentation of the skin.  ABDOMEN:  Soft, NO pain,no hepatomegaly, no splenomegaly,no masses, no ascites, no collateral circulation,no distention.  EXTREMITIES  AND SPINE:  No clubbing, no cyanosis ,no deformities , " no pain .No kyphosis,  no pain in spine, no pain in ribs , no pain in pelvic bone.  NEUROLOGICAL:  Patient was awake, alert, oriented to time, person and place.        RECENT LABS:  Hematology WBC   Date Value Ref Range Status   03/17/2023 8.40 3.40 - 10.80 10*3/mm3 Final   12/14/2021 12.38 (H) 4.5 - 11.0 10*3/uL Final     RBC   Date Value Ref Range Status   03/17/2023 4.38 3.77 - 5.28 10*6/mm3 Final   12/14/2021 4.24 4.0 - 5.2 10*6/uL Final     Hemoglobin   Date Value Ref Range Status   03/17/2023 12.3 12.0 - 15.9 g/dL Final   12/14/2021 12.2 12.0 - 16.0 g/dL Final     Hematocrit   Date Value Ref Range Status   03/17/2023 39.8 34.0 - 46.6 % Final   12/14/2021 37.3 36.0 - 46.0 % Final     Platelets   Date Value Ref Range Status   03/17/2023 544 (H) 140 - 450 10*3/mm3 Final   12/14/2021 495 (H) 140 - 440 10*3/uL Final       CBC:    WBC   Date Value Ref Range Status   03/17/2023 8.40 3.40 - 10.80 10*3/mm3 Final   12/14/2021 12.38 (H) 4.5 - 11.0 10*3/uL Final     RBC   Date Value Ref Range Status   03/17/2023 4.38 3.77 - 5.28 10*6/mm3 Final   12/14/2021 4.24 4.0 - 5.2 10*6/uL Final     Hemoglobin   Date Value Ref Range Status   03/17/2023 12.3 12.0 - 15.9 g/dL Final   12/14/2021 12.2 12.0 - 16.0 g/dL Final     Hematocrit   Date Value Ref Range Status   03/17/2023 39.8 34.0 - 46.6 % Final   12/14/2021 37.3 36.0 - 46.0 % Final     MCV   Date Value Ref Range Status   03/17/2023 90.9 79.0 - 97.0 fL Final   12/14/2021 88.0 80.0 - 100.0 fL Final     MCH   Date Value Ref Range Status   03/17/2023 28.1 26.6 - 33.0 pg Final   12/14/2021 28.8 26.0 - 34.0 pg Final     MCHC   Date Value Ref Range Status   03/17/2023 30.9 (L) 31.5 - 35.7 g/dL Final   12/14/2021 32.7 31.0 - 37.0 g/dL Final     RDW   Date Value Ref Range Status   03/17/2023 15.0 12.3 - 15.4 % Final   12/14/2021 13.9 12.0 - 16.8 % Final     RDW-SD   Date Value Ref Range Status   03/17/2023 50.0 37.0 - 54.0 fl Final     MPV   Date Value Ref Range Status   03/17/2023  9.6 6.0 - 12.0 fL Final   12/14/2021 9.5 8.4 - 12.4 fL Final     Platelets   Date Value Ref Range Status   03/17/2023 544 (H) 140 - 450 10*3/mm3 Final   12/14/2021 495 (H) 140 - 440 10*3/uL Final     Neutrophil Rel %   Date Value Ref Range Status   12/14/2021 63.8 45 - 80 % Final     Neutrophil %   Date Value Ref Range Status   03/17/2023 48.3 42.7 - 76.0 % Final     Lymphocyte Rel %   Date Value Ref Range Status   12/14/2021 24.4 15 - 50 % Final     Lymphocyte %   Date Value Ref Range Status   03/17/2023 37.7 19.6 - 45.3 % Final     Monocyte Rel %   Date Value Ref Range Status   12/14/2021 9.0 0 - 15 % Final     Monocyte %   Date Value Ref Range Status   03/17/2023 12.5 (H) 5.0 - 12.0 % Final     Eosinophil %   Date Value Ref Range Status   03/17/2023 0.5 0.3 - 6.2 % Final   12/14/2021 1.4 0 - 7 % Final     Basophil Rel %   Date Value Ref Range Status   12/14/2021 0.6 0 - 2 % Final     Basophil %   Date Value Ref Range Status   03/17/2023 0.4 0.0 - 1.5 % Final     Immature Grans %   Date Value Ref Range Status   03/17/2023 0.6 (H) 0.0 - 0.5 % Final   12/14/2021 0.8 0.0 - 1.0 % Final     Neutrophils Absolute   Date Value Ref Range Status   12/14/2021 7.89 2.0 - 8.8 10*3/uL Final     Neutrophils, Absolute   Date Value Ref Range Status   03/17/2023 4.06 1.70 - 7.00 10*3/mm3 Final     Lymphocytes Absolute   Date Value Ref Range Status   12/14/2021 3.02 0.7 - 5.5 10*3/uL Final     Lymphocytes, Absolute   Date Value Ref Range Status   03/17/2023 3.17 (H) 0.70 - 3.10 10*3/mm3 Final     Monocytes Absolute   Date Value Ref Range Status   12/14/2021 1.12 0.0 - 1.7 10*3/uL Final     Monocytes, Absolute   Date Value Ref Range Status   03/17/2023 1.05 (H) 0.10 - 0.90 10*3/mm3 Final     Eosinophils Absolute   Date Value Ref Range Status   12/14/2021 0.17 0.0 - 0.8 10*3/uL Final     Eosinophils, Absolute   Date Value Ref Range Status   03/17/2023 0.04 0.00 - 0.40 10*3/mm3 Final     Basophils Absolute   Date Value Ref Range Status    12/14/2021 0.08 0.0 - 0.2 10*3/uL Final     Basophils, Absolute   Date Value Ref Range Status   03/17/2023 0.03 0.00 - 0.20 10*3/mm3 Final     Immature Grans, Absolute   Date Value Ref Range Status   03/17/2023 0.05 0.00 - 0.05 10*3/mm3 Final   12/14/2021 0.10 0.00 - 0.10 10*3/uL Final     nRBC   Date Value Ref Range Status   03/17/2023 0.0 0.0 - 0.2 /100 WBC Final        CMP:    Glucose   Date Value Ref Range Status   03/03/2023 106 74 - 124 mg/dL Final     BUN   Date Value Ref Range Status   03/03/2023 9 6 - 20 mg/dL Final   08/07/2020 5 (L) 7 - 20 mg/dL Final     Creatinine   Date Value Ref Range Status   03/03/2023 0.78 0.60 - 1.10 mg/dL Final   08/07/2020 0.6 (L) 0.7 - 1.5 mg/dL Final     Sodium   Date Value Ref Range Status   03/03/2023 138 134 - 145 mmol/L Final   08/07/2020 137 137 - 145 mmol/L Final     Potassium   Date Value Ref Range Status   03/03/2023 4.1 3.5 - 4.7 mmol/L Final   08/07/2020 3.5 3.5 - 5.1 mmol/L Final     Chloride   Date Value Ref Range Status   03/03/2023 101 98 - 107 mmol/L Final   08/07/2020 106 98 - 107 mmol/L Final     CO2   Date Value Ref Range Status   03/03/2023 22.4 22.0 - 29.0 mmol/L Final     Total CO2   Date Value Ref Range Status   08/07/2020 21 (L) 22 - 30 mmol/L Final     Calcium   Date Value Ref Range Status   03/03/2023 9.7 8.5 - 10.2 mg/dL Final   08/07/2020 8.4 8.4 - 10.2 mg/dL Final     Total Protein   Date Value Ref Range Status   03/03/2023 7.8 6.3 - 8.0 g/dL Final   08/05/2020 7.5 6.3 - 8.2 g/dL Final     Albumin   Date Value Ref Range Status   03/10/2023 3.7 2.9 - 4.4 g/dL Final   03/03/2023 4.5 3.5 - 5.2 g/dL Final   08/05/2020 4.1 3.5 - 5.0 g/dL Final     ALT (SGPT)   Date Value Ref Range Status   03/03/2023 22 0 - 33 U/L Final   08/05/2020 25 0 - 35 U/L Final     Comment:     If ALT testing ordered prior to 2359 on 11/16/19, please use reference range: Male 0-50, Female 0-35.  [facility] [employer] switched to a new ALT assay on 11/16/19 which yields  results 10 U/L lower than the old assay.     AST (SGOT)   Date Value Ref Range Status   03/03/2023 19 0 - 32 U/L Final   08/05/2020 39 15 - 46 U/L Final     Alkaline Phosphatase   Date Value Ref Range Status   03/03/2023 77 38 - 116 U/L Final   08/05/2020 65 38 - 126 U/L Final     Total Bilirubin   Date Value Ref Range Status   03/03/2023 0.3 0.2 - 1.2 mg/dL Final   08/05/2020 0.3 0.2 - 1.3 mg/dL Final     Globulin   Date Value Ref Range Status   03/03/2023 3.3 1.8 - 3.5 gm/dL Final   08/05/2020 3.4 1.5 - 4.5 g/dL Final     A/G Ratio   Date Value Ref Range Status   03/03/2023 1.4 1.1 - 2.4 g/dL Final     BUN/Creatinine Ratio   Date Value Ref Range Status   03/03/2023 11.5 7.3 - 30.0 Final   08/07/2020 8.6 RATIO Final     Anion Gap   Date Value Ref Range Status   03/03/2023 14.6 5.0 - 15.0 mmol/L Final               Assessment & Plan     Diagnoses and all orders for this visit:    1. Multiple myeloma not having achieved remission (HCC) (Primary)  -     CBC and Differential; Future    2. Muscle pain    3. Goiter, nodular       1.  Multiple Myeloma:    · Referred 11/14/2022 for leukocytosis and thrombocytosis by rheumatology, Dr. Ayala.  Diagnosed with Sjogren's by Dr. Ayala.  · History of anemia when she had a cerebral aneurysm clip done in 2019.  · Patient found to have serum protein electrophoresis that disclosed a monoclonal protein   · IgG monoclonal protein in the 2000 category, Bence-Moon protein in the urine, very small amount that is not quantifiable.  · Recommend patient proceed with bone marrow testing.  · 1/24/2023 bone marrow biopsy  · Bone marrow biopsy showing 18% plasma cells infiltrating in the bone marrow. Also absence of iron stores. The patient had no evidence of myelofibrosis, lymphoma, leukemia, or myelodysplasia.   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 [place] red stain was negative for amyloid deposition  · Recommended treatment with  combination of Darzalex once a week, Velcade subcutaneously once a week, 3 weeks out of 4, Revlimid 15 mg a day for 3 weeks out of a month and dexamethasone 20 mg once a week.  · Plan to treat the patient for the next 4 months following her monoclonal protein and eventually referred to [facility] for consideration of high-dose chemotherapy and stem cell transplant.  · No evidence of lytic lesions, therefore will not use Zometa or Xgeva at this time.  · 2/3/2023 cycle 1 Darzalex Faspro and Velcade.  · 02/10/2023 the patient was further reviewed the day that she comes for her 2nd Darzalex infusion and 2nd Velcade injection. So far the patient has not encountered any side effects of the medicines.  · Seen 2/24/2023 due for cycle 1 day 22.  Patient started her Revlimid 15 mg daily for 3 out of 4 weeks last Wednesday.  So far she is tolerating all of her treatment fairly well without any significant side effects other than ongoing fatigue.  · 3/10/2023 here for cycle 2, day 8 Velcade, Darzalex fast pro, continuing on Revlimid 15 mg daily for 3 weeks on, 1 week off as well as dexamethasone 20 mg weekly.  We will recheck paraprotein studies today.  On 03/17/2023, I examined the patient and she actually looks terrific. She has had significant decrease up to the point of resolution of pain in her lower extremities. That to me is an indirect sign of myeloma improving. Furthermore her white count, hemoglobin and platelets are completely normal today. Her white count differential is normal and furthermore her monoclonal protein has dropped by half in only 1 month of therapy. I shared this information with her today and congratulated for the good job of putting up with all these medicines.     I encouraged her to continue her regimen of medicines exactly the same and I asked her to continue this at least for the next 2 months. The patient raised the question in regard to when she is going to have the referral for bone  marrow transplantation and I pointed out to her that at some point in May or June this referral will be made. She will be seen by Dr. Plasencia at the [facility].     In the meantime her regimens of medicines will remain the same for the time being and I am not planning to change anything at this point. Tolerance to all the medicines has been acceptable.    ·   2.  Nodular goiter: she has a normal TSH and a normal T4. I will let Beau Frye MD, the patient's Primary Physician address this issue eventually with an ultrasound and clinical examination. The patient has antithyroid antibody positivity and very likely in my opinion she probably has a component of Hashimoto's thyroiditis.   On 03/17/2023 her goiter is no different today than what it was during the original consultation. This will be watched in absence of any other intervention.    ·   3.  Prophylaxis: recommend she start low-dose Xarelto 2.5 mg p.o. daily after she has had her molar extracted for initiation and before initiation of her Revlimid administration.   On 03/17/2023 the patient is taking low-dose Xarelto 2.5 mg a day for prevention of thrombosis associated with Revlimid utilization in the background of multiple myeloma. She has not encountered any clinical bleeding or thrombosis. These medicines will remain ongoing for the time being.    ·   4.  Iron deficiency:  · given her absence of iron stores in her bone marrow she will require IV iron therapy. This could be accommodated at some point between all the infusions and treatments that she needs to have.  On 03/17/2023 the patient's hemoglobin is stable. We are glad to see this happening at this time.    ·   5.  Sjogren's syndrome with myopathy:  · Followed by rheumatology  On 03/17/2023 the patient has minimal symptomatology pertinent to this at this time besides minimal dryness in her eyes and oral mucosas.        PLAN:    The patient will continue coming to the office on weekly basis  for her medications administration. Furthermore today I went through the report of her serum protein immunoelectrophoresis recently done and compared with the original one. Her monoclonal protein has already come down by 1000 points and that is very remarkable. I congratulated her for putting up with all this stuff and she realizes that she is getting better and that gives her a big push in regard to the benefit of the treatment. She is planning to continue doing the same thing for the time being.     Otherwise, appointments were made for her weekly treatment and weekly laboratory parameters. We will collect monoclonal protein studies in 1 month and 2 months.      Patient is on a high risk medication requiring close monitoring for toxicity.      Estuardo Freedman MD  03/17/2023

## 2023-03-20 ENCOUNTER — TELEPHONE (OUTPATIENT)
Dept: ONCOLOGY | Facility: CLINIC | Age: 53
End: 2023-03-20
Payer: COMMERCIAL

## 2023-03-20 DIAGNOSIS — C90.00 MULTIPLE MYELOMA NOT HAVING ACHIEVED REMISSION: Primary | ICD-10-CM

## 2023-03-20 LAB
ALBUMIN SERPL ELPH-MCNC: 3.9 G/DL (ref 2.9–4.4)
ALBUMIN/GLOB SERPL: 1.2 {RATIO} (ref 0.7–1.7)
ALPHA1 GLOB SERPL ELPH-MCNC: 0.2 G/DL (ref 0–0.4)
ALPHA2 GLOB SERPL ELPH-MCNC: 1.1 G/DL (ref 0.4–1)
B-GLOBULIN SERPL ELPH-MCNC: 1 G/DL (ref 0.7–1.3)
GAMMA GLOB SERPL ELPH-MCNC: 1.3 G/DL (ref 0.4–1.8)
GLOBULIN SER-MCNC: 3.5 G/DL (ref 2.2–3.9)
IGA SERPL-MCNC: 69 MG/DL (ref 87–352)
IGG SERPL-MCNC: 1270 MG/DL (ref 586–1602)
IGM SERPL-MCNC: 53 MG/DL (ref 26–217)
INTERPRETATION SERPL IEP-IMP: ABNORMAL
KAPPA LC FREE SER-MCNC: 28.8 MG/L (ref 3.3–19.4)
KAPPA LC FREE/LAMBDA FREE SER: 2.85 {RATIO} (ref 0.26–1.65)
LABORATORY COMMENT REPORT: ABNORMAL
LAMBDA LC FREE SERPL-MCNC: 10.1 MG/L (ref 5.7–26.3)
M PROTEIN SERPL ELPH-MCNC: 0.6 G/DL
PROT SERPL-MCNC: 7.4 G/DL (ref 6–8.5)

## 2023-03-20 NOTE — TELEPHONE ENCOUNTER
Referral placed  ----- Message from Estuardo Freedman MD sent at 3/20/2023  9:53 AM EDT -----  Emelia Morin, Estuardo Howell MD  HI!   If you get this prior to seeing Yudy, could you please consider referral to [facility]? Yudy mentioned weight gain, goals of healthy weight mgmt. For some reason, I cannot place referral......   Thank you!   Christy BARTH PLEASE MAKE THE REFERRAL

## 2023-03-24 ENCOUNTER — LAB (OUTPATIENT)
Dept: LAB | Facility: HOSPITAL | Age: 53
End: 2023-03-24
Payer: COMMERCIAL

## 2023-03-24 ENCOUNTER — OFFICE VISIT (OUTPATIENT)
Dept: OTHER | Facility: HOSPITAL | Age: 53
End: 2023-03-24
Payer: COMMERCIAL

## 2023-03-24 ENCOUNTER — INFUSION (OUTPATIENT)
Dept: ONCOLOGY | Facility: HOSPITAL | Age: 53
End: 2023-03-24
Payer: COMMERCIAL

## 2023-03-24 VITALS
DIASTOLIC BLOOD PRESSURE: 78 MMHG | OXYGEN SATURATION: 96 % | TEMPERATURE: 97.8 F | WEIGHT: 197.4 LBS | BODY MASS INDEX: 32.85 KG/M2 | RESPIRATION RATE: 16 BRPM | HEART RATE: 78 BPM | SYSTOLIC BLOOD PRESSURE: 145 MMHG

## 2023-03-24 DIAGNOSIS — C90.00 MULTIPLE MYELOMA NOT HAVING ACHIEVED REMISSION: ICD-10-CM

## 2023-03-24 DIAGNOSIS — M79.10 MUSCLE PAIN: ICD-10-CM

## 2023-03-24 DIAGNOSIS — E04.9 GOITER, NODULAR: ICD-10-CM

## 2023-03-24 DIAGNOSIS — C90.00 MULTIPLE MYELOMA NOT HAVING ACHIEVED REMISSION: Primary | ICD-10-CM

## 2023-03-24 LAB
ALBUMIN SERPL-MCNC: 4.4 G/DL (ref 3.5–5.2)
ALBUMIN/GLOB SERPL: 1.6 G/DL (ref 1.1–2.4)
ALP SERPL-CCNC: 75 U/L (ref 38–116)
ALT SERPL W P-5'-P-CCNC: 20 U/L (ref 0–33)
ANION GAP SERPL CALCULATED.3IONS-SCNC: 12.1 MMOL/L (ref 5–15)
AST SERPL-CCNC: 20 U/L (ref 0–32)
BASOPHILS # BLD AUTO: 0.04 10*3/MM3 (ref 0–0.2)
BASOPHILS NFR BLD AUTO: 0.5 % (ref 0–1.5)
BILIRUB SERPL-MCNC: 0.2 MG/DL (ref 0.2–1.2)
BUN SERPL-MCNC: 13 MG/DL (ref 6–20)
BUN/CREAT SERPL: 16.3 (ref 7.3–30)
CALCIUM SPEC-SCNC: 9.8 MG/DL (ref 8.5–10.2)
CHLORIDE SERPL-SCNC: 103 MMOL/L (ref 98–107)
CO2 SERPL-SCNC: 24.9 MMOL/L (ref 22–29)
CREAT SERPL-MCNC: 0.8 MG/DL (ref 0.6–1.1)
DEPRECATED RDW RBC AUTO: 49.8 FL (ref 37–54)
EGFRCR SERPLBLD CKD-EPI 2021: 89.9 ML/MIN/1.73
EOSINOPHIL # BLD AUTO: 0.25 10*3/MM3 (ref 0–0.4)
EOSINOPHIL NFR BLD AUTO: 3.1 % (ref 0.3–6.2)
ERYTHROCYTE [DISTWIDTH] IN BLOOD BY AUTOMATED COUNT: 15.3 % (ref 12.3–15.4)
GLOBULIN UR ELPH-MCNC: 2.8 GM/DL (ref 1.8–3.5)
GLUCOSE SERPL-MCNC: 81 MG/DL (ref 74–124)
HCT VFR BLD AUTO: 38.5 % (ref 34–46.6)
HGB BLD-MCNC: 12.5 G/DL (ref 12–15.9)
IMM GRANULOCYTES # BLD AUTO: 0.03 10*3/MM3 (ref 0–0.05)
IMM GRANULOCYTES NFR BLD AUTO: 0.4 % (ref 0–0.5)
LYMPHOCYTES # BLD AUTO: 2.32 10*3/MM3 (ref 0.7–3.1)
LYMPHOCYTES NFR BLD AUTO: 29 % (ref 19.6–45.3)
MCH RBC QN AUTO: 29.1 PG (ref 26.6–33)
MCHC RBC AUTO-ENTMCNC: 32.5 G/DL (ref 31.5–35.7)
MCV RBC AUTO: 89.5 FL (ref 79–97)
MONOCYTES # BLD AUTO: 0.57 10*3/MM3 (ref 0.1–0.9)
MONOCYTES NFR BLD AUTO: 7.1 % (ref 5–12)
NEUTROPHILS NFR BLD AUTO: 4.78 10*3/MM3 (ref 1.7–7)
NEUTROPHILS NFR BLD AUTO: 59.9 % (ref 42.7–76)
NRBC BLD AUTO-RTO: 0 /100 WBC (ref 0–0.2)
PLATELET # BLD AUTO: 414 10*3/MM3 (ref 140–450)
PMV BLD AUTO: 10.5 FL (ref 6–12)
POTASSIUM SERPL-SCNC: 4.4 MMOL/L (ref 3.5–4.7)
PROT SERPL-MCNC: 7.2 G/DL (ref 6.3–8)
RBC # BLD AUTO: 4.3 10*6/MM3 (ref 3.77–5.28)
SODIUM SERPL-SCNC: 140 MMOL/L (ref 134–145)
WBC NRBC COR # BLD: 7.99 10*3/MM3 (ref 3.4–10.8)

## 2023-03-24 PROCEDURE — 36415 COLL VENOUS BLD VENIPUNCTURE: CPT

## 2023-03-24 PROCEDURE — 25010000002 DARATUMUMAB-HYALURONIDASE-FIHJ 1800-30000 MG-UT/15ML SOLUTION: Performed by: NURSE PRACTITIONER

## 2023-03-24 PROCEDURE — 85025 COMPLETE CBC W/AUTO DIFF WBC: CPT

## 2023-03-24 PROCEDURE — 63710000001 DIPHENHYDRAMINE PER 50 MG: Performed by: INTERNAL MEDICINE

## 2023-03-24 PROCEDURE — 80053 COMPREHEN METABOLIC PANEL: CPT

## 2023-03-24 PROCEDURE — 96401 CHEMO ANTI-NEOPL SQ/IM: CPT

## 2023-03-24 RX ORDER — DIPHENHYDRAMINE HCL 25 MG
25 CAPSULE ORAL ONCE
Status: COMPLETED | OUTPATIENT
Start: 2023-03-24 | End: 2023-03-24

## 2023-03-24 RX ORDER — SODIUM CHLORIDE 9 MG/ML
250 INJECTION, SOLUTION INTRAVENOUS ONCE
Status: CANCELLED | OUTPATIENT
Start: 2023-03-24

## 2023-03-24 RX ORDER — CLINDAMYCIN PHOSPHATE 10 MG/G
1 GEL TOPICAL 2 TIMES DAILY
Qty: 30 G | Refills: 0 | Status: SHIPPED | OUTPATIENT
Start: 2023-03-24

## 2023-03-24 RX ORDER — FAMOTIDINE 20 MG/1
20 TABLET, FILM COATED ORAL
Status: DISCONTINUED | OUTPATIENT
Start: 2023-03-24 | End: 2023-03-24

## 2023-03-24 RX ORDER — ACETAMINOPHEN 500 MG
1000 TABLET ORAL ONCE
Status: COMPLETED | OUTPATIENT
Start: 2023-03-24 | End: 2023-03-24

## 2023-03-24 RX ORDER — FAMOTIDINE 20 MG/1
20 TABLET, FILM COATED ORAL ONCE
Status: COMPLETED | OUTPATIENT
Start: 2023-03-24 | End: 2023-03-24

## 2023-03-24 RX ORDER — DIPHENHYDRAMINE HYDROCHLORIDE 50 MG/ML
50 INJECTION INTRAMUSCULAR; INTRAVENOUS AS NEEDED
Status: CANCELLED | OUTPATIENT
Start: 2023-03-24

## 2023-03-24 RX ORDER — FAMOTIDINE 10 MG/ML
20 INJECTION, SOLUTION INTRAVENOUS AS NEEDED
Status: CANCELLED | OUTPATIENT
Start: 2023-03-24

## 2023-03-24 RX ADMIN — FAMOTIDINE 20 MG: 20 TABLET ORAL at 13:04

## 2023-03-24 RX ADMIN — DIPHENHYDRAMINE HYDROCHLORIDE 25 MG: 25 CAPSULE ORAL at 13:04

## 2023-03-24 RX ADMIN — DARATUMUMAB AND HYALURONIDASE-FIHJ (HUMAN RECOMBINANT) 1800 MG: 1800; 30000 INJECTION SUBCUTANEOUS at 13:44

## 2023-03-24 RX ADMIN — ACETAMINOPHEN 1000 MG: 500 TABLET, FILM COATED ORAL at 13:04

## 2023-03-24 NOTE — NURSING NOTE
Pt reports bilateral face/neck rash. Jo RN spoke to pt before coming into the office stating she would come to infusion area to take pictures to show . Jo took pics to show MD. Per MD hold bactrim for now and prescription cream will be sent to pts pharmacy. Yamil wall.

## 2023-03-24 NOTE — PROGRESS NOTES
OUTPATIENT ONCOLOGY NUTRITION ASSESSMENT    Patient Name: Yudy Hinton  YOB: 1970  MRN: [id number]  Assessment Date: 3/24/2023    COMMENTS: Referral from supportive oncology for diet education. Patient agreed to meet in the infusion area today during appointment with a friend present.   The patient has been steadily gaining weight. Not sure what to eat, what to avoid. Complains of extreme hunger and then early satiety. Obtained food recall and made recommendations for food choices during the day while she is at work. Provided patient with handouts with meal and snack ideas, recipe sites, a sample grocery list and a basic meal plan. Encouraged patient to choose foods that are protein rich, high in fiber, increasing vegetables and fresh fruit, beans, seeds, unsalted nuts, legumes, lean protein low fat dairy and to limit highly processed foods and refined sugars.   Will be available for any questions.         Reason for Assessment New assessment, Nurse Practitioner referral , Education      Diagnosis/Problem   Hyperglobulinemia, presumed monoclonal protein in blood, LIKELY MGUS. NO ANEMIA, NORMAL CREATININE,NORMAL CALCIUM, NO BONE PAIN NO ADENOPATHIES , NORMAL DIFFERENTIAL IN WBC.  LEUKOCYTOSIS AND THROMBOCYTOSIS.  GOITER VERY LIKELY HASHIMOTO'S THYROIDITIS.  SJOGREN'S SYNDROME   Treatment Plan Comment:  Decadron, revlimid, velcade, darzalex   Frequency    Goal of cancer treatment Other:    Comments:        Encounter Information        Nutrition/Diet History:     Oral Nutrition Supplements:    Factors/Symptoms Affecting Intake: No factors at this time   Comments:      Medical/Surgical History Past Medical History:   Diagnosis Date   • Achilles tendonitis    • Anemia    • Anxiety    • Arthritis     ankles, shoulders   • Asthma     childhood   • Benign essential hypertension    • Cerebral aneurysm    • GERD (gastroesophageal reflux disease)    • Headache    • History of anemia    • History of E. coli  "septicemia    • Multiple myeloma (HCC) 2023   • Rotator cuff syndrome    • Seasonal allergies        Past Surgical History:   Procedure Laterality Date   • ABSCESS DRAINAGE  2000    Renal abscess   • CEREBRAL ANGIOGRAM  2020   • CRANIOTOMY  2020    with angiogram for aneurysm   • HYSTERECTOMY  2017    partial, both ovaries remain   • ROTATOR CUFF REPAIR Right 2012   • SHOULDER SURGERY            Anthropometrics        Current Height Ht Readings from Last 1 Encounters:   03/17/23 165.1 cm (65\")      Current Weight Wt Readings from Last 1 Encounters:   03/24/23 89.5 kg (197 lb 6.4 oz)      BMI  32   Ideal Body Weight (IBW) 125   Usual Body Weight (UBW) 190 lb   Weight Change/Trend Gain   Weight History Wt Readings from Last 30 Encounters:   03/24/23 1248 89.5 kg (197 lb 6.4 oz)   03/17/23 0823 87.5 kg (193 lb)   03/10/23 1422 89.8 kg (197 lb 14.4 oz)   03/03/23 1314 89.4 kg (197 lb 3.2 oz)   02/24/23 1334 90 kg (198 lb 6.4 oz)   02/17/23 1117 88.4 kg (194 lb 12.8 oz)   02/10/23 1019 87.9 kg (193 lb 12.8 oz)   02/01/23 1112 86.7 kg (191 lb 3.2 oz)   01/27/23 0706 86.2 kg (190 lb)   01/18/23 0916 86.3 kg (190 lb 4.8 oz)   01/04/23 0739 86.2 kg (190 lb)   12/06/22 0803 85.5 kg (188 lb 9.6 oz)   11/14/22 1151 86.9 kg (191 lb 9.6 oz)          Medications           Current medications: OLANZapine, Rimegepant Sulfate, Rivaroxaban, acyclovir, albuterol sulfate HFA, amLODIPine, dexamethasone, galcanezumab-gnlm, lenalidomide, levothyroxine, metoprolol tartrate, ondansetron, and sulfamethoxazole-trimethoprim                 Tests/Procedures        Tests/Procedures No new tests/procedures     Labs       Pertinent Labs          Invalid input(s): LABALBU, PROT  Results from last 7 days   Lab Units 03/24/23  1237   HEMOGLOBIN g/dL 12.5   HEMATOCRIT % 38.5   WBC 10*3/mm3 7.99     Results from last 7 days   Lab Units 03/24/23  1237   PLATELETS 10*3/mm3 414     No results found for: COVID19  No results found for: HGBA1C       Physical " Findings        Physical Appearance alert     Edema  no edema   Gastrointestinal None   Tubes/Drains none   Oral/Mouth Cavity WNL   Skin        Estimated/Assessed Needs        Energy Requirements    Height for Calculation      Weight for Calculation 197 lb   Method for Estimation  18 kcal/kg, 20 kcal/kg   EST Needs (kcal/day) [id number]       Protein Requirements    Weight for Calculation 197 lb   EST Protein Needs (g/kg) 0.8 gm/kg   EST Daily Needs (g/day) 71       Fluid Requirements     Method for Estimation 25 mL/kg    Estimated Needs (mL/day) 2225           PES STATEMENT / NUTRITION DIAGNOSIS      Nutrition Dx Problem Problem:    Knowledge Deficit    Etiology:  Medical diagnosis: Multiple myeloma  Factors affecting nutrition: Reported/Observed By      Signs/Symptoms:  Information Deficit and Unintended Weight Change    Comment:      NUTRITION INTERVENTION / PLAN OF CARE      Intervention Goal(s) Provide information and Appropriate weight loss         RD Intervention/Action Other: will be available for any questions         Recommendations:       PO Diet       Supplements       Snacks    --      Monitor/Evaluation Follow up as needed   Education Education provided   --    RD to follow per protocol.    Electronically signed by:  Sophia Gomes RD, LD  03/24/23 14:04 EDT

## 2023-03-29 NOTE — PROGRESS NOTES
Subjective        REASONS FOR FOLLOWUP:    1. 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 NO ANEMIA, NORMAL CREATININE,NORMAL CALCIUM, NO BONE PAIN NO ADENOPATHIES , NORMAL DIFFERENTIAL IN WBC.  2. LEUKOCYTOSIS AND THROMBOCYTOSIS.  3. GOITER VERY LIKELY HASHIMOTO'S THYROIDITIS.  4. SJOGREN'S SYNDROME.      HISTORY OF PRESENT ILLNESS:  The patient returns today for follow up and evaluation prior to cycle 3 day 2 darzalex, velcade, revlimid, and dexamethasone.  She is on her second week of Revlimid.  She has been holding her Bactrim as recommended by Dr. Freedman due to skin rash on her face.  He sent in clindamycin, however she was not able to pick this up until Tuesday, so she is only been taking this for a few days, however has noted improvement in her rash, which is now only present to her cheeks, previously present on her cheeks and neck.  She also continues to experience peripheral neuropathy in her hands and feet.  This is intermittent and was present prior to initiating chemotherapy.  She does feel that her neuropathy has actually improved since initiating chemotherapy, as it was previously pretty constant.  She is eating and drinking adequately.  She does notice diarrhea on Sundays following chemotherapy.  She has 1-2 episodes of diarrhea on Sundays, not requiring antidiarrheals.  Denies fever or chills.  Denies nausea or vomiting.  Denies new or worsening pain.  She denies signs or symptoms of bleeding.  No new concerns today.      Past Medical History:   Diagnosis Date   • Achilles tendonitis    • Anemia    • Anxiety    • Arthritis     ankles, shoulders   • Asthma     childhood   • Benign essential hypertension    • Cerebral aneurysm    • GERD (gastroesophageal reflux disease)    • Headache    • History of anemia    • History of E. coli septicemia    • Multiple myeloma (HCC) 2023   • Rotator cuff syndrome    • Seasonal allergies         Past Surgical History:   Procedure Laterality  Date   • ABSCESS DRAINAGE  2000    Renal abscess   • CEREBRAL ANGIOGRAM  2020   • CRANIOTOMY  2020    with angiogram for aneurysm   • HYSTERECTOMY  2017    partial, both ovaries remain   • ROTATOR CUFF REPAIR Right 2012   • SHOULDER SURGERY          Current Outpatient Medications on File Prior to Visit   Medication Sig Dispense Refill   • acyclovir (ZOVIRAX) 400 MG tablet Take 1 tablet by mouth 2 (Two) Times a Day. 60 tablet 3   • albuterol sulfate  (90 Base) MCG/ACT inhaler Ventolin HFA 90 mcg/actuation aerosol inhaler     • amLODIPine (NORVASC) 5 MG tablet      • clindamycin (Clindagel) 1 % gel Apply 1 application topically to the appropriate area as directed 2 (Two) Times a Day. 30 g 0   • dexamethasone (DECADRON) 4 MG tablet Take 5 tablets by mouth 1 (One) Time Per Week. Take in the morning with food. 20 tablet 3   • Emgality 120 MG/ML auto-injector pen INJECT 1 ML SUBCUTANEOUSLY ONCE EVERY MONTH     • levothyroxine (SYNTHROID, LEVOTHROID) 25 MCG tablet      • metoprolol tartrate (LOPRESSOR) 50 MG tablet Take 1 tablet by mouth Every Morning.     • OLANZapine (ZyPREXA) 2.5 MG tablet Take 1 tablet by mouth Every Night. 30 tablet 3   • ondansetron (ZOFRAN) 8 MG tablet Take 1 tablet by mouth 3 (Three) Times a Day As Needed for Nausea or Vomiting. 30 tablet 5   • Rimegepant Sulfate (NURTEC) 75 MG tablet dispersible tablet Take 1 tablet by mouth Daily As Needed.     • Rivaroxaban (Xarelto) 2.5 MG tablet Take 1 tablet by mouth Daily. 30 tablet 2   • sulfamethoxazole-trimethoprim (BACTRIM DS,SEPTRA DS) 800-160 MG per tablet Take 1 tablet by mouth 3 (Three) Times a Week. Take 1 tablet on Mondays, Wednesdays and Fridays. 12 tablet 3   • [DISCONTINUED] lenalidomide (Revlimid) 15 MG capsule TAKE 1 CAPSULE BY MOUTH ONCE DAILY FOR 21 DAYS ON AND 7 DAYS OFF 21 capsule 0     No current facility-administered medications on file prior to visit.        ALLERGIES:    Allergies   Allergen Reactions   • Coconut Shortness Of  "Breath   • Eggs Or Egg-Derived Products Diarrhea        Social History     Socioeconomic History   • Marital status:    • Number of children: 3   Tobacco Use   • Smoking status: Never   • Smokeless tobacco: Never   Substance and Sexual Activity   • Alcohol use: Yes   • Drug use: Never        Family History   Problem Relation Age of Onset   • Hypertension Father    • Multiple sclerosis Brother    • Diabetes Brother    • Colon cancer Paternal Aunt    • Heart attack Maternal Grandmother    • Heart disease Maternal Grandmother    • Pancreatic cancer Maternal Grandfather    • Colon cancer Paternal Grandmother    • Heart attack Paternal Grandfather    • Cancer Paternal Grandfather       Objective     Vitals:    03/31/23 0911   BP: 128/61   Pulse: 63   Resp: 16   Temp: 97 °F (36.1 °C)   TempSrc: Temporal   SpO2: 97%   Weight: 88.9 kg (195 lb 14.4 oz)   Height: 165.1 cm (65\")   PainSc: 0-No pain     Current Status 3/31/2023   ECOG score 0     Physical Exam  Vitals reviewed.   HENT:      Head: Normocephalic.      Nose: Nose normal.      Mouth/Throat:      Mouth: Mucous membranes are moist.      Pharynx: Oropharynx is clear.   Eyes:      Conjunctiva/sclera: Conjunctivae normal.      Pupils: Pupils are equal, round, and reactive to light.   Cardiovascular:      Rate and Rhythm: Normal rate.      Heart sounds: Normal heart sounds.   Pulmonary:      Effort: Pulmonary effort is normal.      Breath sounds: Normal breath sounds.   Abdominal:      General: Abdomen is flat. Bowel sounds are normal.   Skin:     General: Skin is warm and dry.      Findings: No rash.   Neurological:      General: No focal deficit present.      Mental Status: She is alert and oriented to person, place, and time. Mental status is at baseline.      Motor: No weakness.   Psychiatric:         Mood and Affect: Mood normal.         Behavior: Behavior normal.         Thought Content: Thought content normal.         Judgment: Judgment normal. "       RECENT LABS:  Hematology WBC   Date Value Ref Range Status   03/31/2023 9.78 3.40 - 10.80 10*3/mm3 Final   12/14/2021 12.38 (H) 4.5 - 11.0 10*3/uL Final     RBC   Date Value Ref Range Status   03/31/2023 4.20 3.77 - 5.28 10*6/mm3 Final   12/14/2021 4.24 4.0 - 5.2 10*6/uL Final     Hemoglobin   Date Value Ref Range Status   03/31/2023 12.3 12.0 - 15.9 g/dL Final   12/14/2021 12.2 12.0 - 16.0 g/dL Final     Hematocrit   Date Value Ref Range Status   03/31/2023 37.6 34.0 - 46.6 % Final   12/14/2021 37.3 36.0 - 46.0 % Final     Platelets   Date Value Ref Range Status   03/31/2023 427 140 - 450 10*3/mm3 Final   12/14/2021 495 (H) 140 - 440 10*3/uL Final       CBC:    WBC   Date Value Ref Range Status   03/31/2023 9.78 3.40 - 10.80 10*3/mm3 Final   12/14/2021 12.38 (H) 4.5 - 11.0 10*3/uL Final     RBC   Date Value Ref Range Status   03/31/2023 4.20 3.77 - 5.28 10*6/mm3 Final   12/14/2021 4.24 4.0 - 5.2 10*6/uL Final     Hemoglobin   Date Value Ref Range Status   03/31/2023 12.3 12.0 - 15.9 g/dL Final   12/14/2021 12.2 12.0 - 16.0 g/dL Final     Hematocrit   Date Value Ref Range Status   03/31/2023 37.6 34.0 - 46.6 % Final   12/14/2021 37.3 36.0 - 46.0 % Final     MCV   Date Value Ref Range Status   03/31/2023 89.5 79.0 - 97.0 fL Final   12/14/2021 88.0 80.0 - 100.0 fL Final     MCH   Date Value Ref Range Status   03/31/2023 29.3 26.6 - 33.0 pg Final   12/14/2021 28.8 26.0 - 34.0 pg Final     MCHC   Date Value Ref Range Status   03/31/2023 32.7 31.5 - 35.7 g/dL Final   12/14/2021 32.7 31.0 - 37.0 g/dL Final     RDW   Date Value Ref Range Status   03/31/2023 14.9 12.3 - 15.4 % Final   12/14/2021 13.9 12.0 - 16.8 % Final     RDW-SD   Date Value Ref Range Status   03/31/2023 48.6 37.0 - 54.0 fl Final     MPV   Date Value Ref Range Status   03/31/2023 9.8 6.0 - 12.0 fL Final   12/14/2021 9.5 8.4 - 12.4 fL Final     Platelets   Date Value Ref Range Status   03/31/2023 427 140 - 450 10*3/mm3 Final   12/14/2021 495 (H) 140  - 440 10*3/uL Final     Neutrophil Rel %   Date Value Ref Range Status   12/14/2021 63.8 45 - 80 % Final     Neutrophil %   Date Value Ref Range Status   03/31/2023 65.7 42.7 - 76.0 % Final     Lymphocyte Rel %   Date Value Ref Range Status   12/14/2021 24.4 15 - 50 % Final     Lymphocyte %   Date Value Ref Range Status   03/31/2023 19.2 (L) 19.6 - 45.3 % Final     Monocyte Rel %   Date Value Ref Range Status   12/14/2021 9.0 0 - 15 % Final     Monocyte %   Date Value Ref Range Status   03/31/2023 9.5 5.0 - 12.0 % Final     Eosinophil %   Date Value Ref Range Status   03/31/2023 4.3 0.3 - 6.2 % Final   12/14/2021 1.4 0 - 7 % Final     Basophil Rel %   Date Value Ref Range Status   12/14/2021 0.6 0 - 2 % Final     Basophil %   Date Value Ref Range Status   03/31/2023 0.9 0.0 - 1.5 % Final     Immature Grans %   Date Value Ref Range Status   03/31/2023 0.4 0.0 - 0.5 % Final   12/14/2021 0.8 0.0 - 1.0 % Final     Neutrophils Absolute   Date Value Ref Range Status   12/14/2021 7.89 2.0 - 8.8 10*3/uL Final     Neutrophils, Absolute   Date Value Ref Range Status   03/31/2023 6.42 1.70 - 7.00 10*3/mm3 Final     Lymphocytes Absolute   Date Value Ref Range Status   12/14/2021 3.02 0.7 - 5.5 10*3/uL Final     Lymphocytes, Absolute   Date Value Ref Range Status   03/31/2023 1.88 0.70 - 3.10 10*3/mm3 Final     Monocytes Absolute   Date Value Ref Range Status   12/14/2021 1.12 0.0 - 1.7 10*3/uL Final     Monocytes, Absolute   Date Value Ref Range Status   03/31/2023 0.93 (H) 0.10 - 0.90 10*3/mm3 Final     Eosinophils Absolute   Date Value Ref Range Status   12/14/2021 0.17 0.0 - 0.8 10*3/uL Final     Eosinophils, Absolute   Date Value Ref Range Status   03/31/2023 0.42 (H) 0.00 - 0.40 10*3/mm3 Final     Basophils Absolute   Date Value Ref Range Status   12/14/2021 0.08 0.0 - 0.2 10*3/uL Final     Basophils, Absolute   Date Value Ref Range Status   03/31/2023 0.09 0.00 - 0.20 10*3/mm3 Final     Immature Grans, Absolute   Date  Value Ref Range Status   03/31/2023 0.04 0.00 - 0.05 10*3/mm3 Final   12/14/2021 0.10 0.00 - 0.10 10*3/uL Final     nRBC   Date Value Ref Range Status   03/31/2023 0.0 0.0 - 0.2 /100 WBC Final        CMP:    Glucose   Date Value Ref Range Status   03/24/2023 81 74 - 124 mg/dL Final     BUN   Date Value Ref Range Status   03/24/2023 13 6 - 20 mg/dL Final   08/07/2020 5 (L) 7 - 20 mg/dL Final     Creatinine   Date Value Ref Range Status   03/24/2023 0.80 0.60 - 1.10 mg/dL Final   08/07/2020 0.6 (L) 0.7 - 1.5 mg/dL Final     Sodium   Date Value Ref Range Status   03/24/2023 140 134 - 145 mmol/L Final   08/07/2020 137 137 - 145 mmol/L Final     Potassium   Date Value Ref Range Status   03/24/2023 4.4 3.5 - 4.7 mmol/L Final   08/07/2020 3.5 3.5 - 5.1 mmol/L Final     Chloride   Date Value Ref Range Status   03/24/2023 103 98 - 107 mmol/L Final   08/07/2020 106 98 - 107 mmol/L Final     CO2   Date Value Ref Range Status   03/24/2023 24.9 22.0 - 29.0 mmol/L Final     Total CO2   Date Value Ref Range Status   08/07/2020 21 (L) 22 - 30 mmol/L Final     Calcium   Date Value Ref Range Status   03/24/2023 9.8 8.5 - 10.2 mg/dL Final   08/07/2020 8.4 8.4 - 10.2 mg/dL Final     Total Protein   Date Value Ref Range Status   03/24/2023 7.2 6.3 - 8.0 g/dL Final   08/05/2020 7.5 6.3 - 8.2 g/dL Final     Albumin   Date Value Ref Range Status   03/24/2023 4.4 3.5 - 5.2 g/dL Final   08/05/2020 4.1 3.5 - 5.0 g/dL Final     ALT (SGPT)   Date Value Ref Range Status   03/24/2023 20 0 - 33 U/L Final   08/05/2020 25 0 - 35 U/L Final     Comment:     If ALT testing ordered prior to 2359 on 11/16/19, please use reference range: Male 0-50, Female 0-35.  [employer] switched to a new ALT assay on 11/16/19 which yields results 10 U/L lower than the old assay.     AST (SGOT)   Date Value Ref Range Status   03/24/2023 20 0 - 32 U/L Final   08/05/2020 39 15 - 46 U/L Final     Alkaline Phosphatase   Date Value Ref Range Status    03/24/2023 75 38 - 116 U/L Final   08/05/2020 65 38 - 126 U/L Final     Total Bilirubin   Date Value Ref Range Status   03/24/2023 0.2 0.2 - 1.2 mg/dL Final   08/05/2020 0.3 0.2 - 1.3 mg/dL Final     Globulin   Date Value Ref Range Status   03/24/2023 2.8 1.8 - 3.5 gm/dL Final   08/05/2020 3.4 1.5 - 4.5 g/dL Final     A/G Ratio   Date Value Ref Range Status   03/24/2023 1.6 1.1 - 2.4 g/dL Final     BUN/Creatinine Ratio   Date Value Ref Range Status   03/24/2023 16.3 7.3 - 30.0 Final   08/07/2020 8.6 RATIO Final     Anion Gap   Date Value Ref Range Status   03/24/2023 12.1 5.0 - 15.0 mmol/L Final           Assessment & Plan     Diagnoses and all orders for this visit:    1. Multiple myeloma not having achieved remission (HCC) (Primary)  -     CBC and Differential; Future  -     Comprehensive metabolic panel; Future  -     Thyroid Panel With TSH; Future  -     Pregnancy, Urine - Urine, Clean Catch; Future  -     Cancel: famotidine (PEPCID) tablet 20 mg  -     Cancel: acetaminophen (TYLENOL) tablet 1,000 mg  -     Cancel: diphenhydrAMINE (BENADRYL) capsule 25 mg    2. High risk medication use    Other orders  -     sodium chloride 0.9 % infusion 250 mL  -     bortezomib (VELCADE) chemo injection 2.5 mg  -     daratumumab-hyaluronidase-fihj (DARZALEX FASPRO) [id number] MG-UT/15ML injection 1,800 mg  -     Hydrocortisone Sod Suc (PF) (Solu-CORTEF) injection 100 mg  -     diphenhydrAMINE (BENADRYL) injection 50 mg  -     famotidine (PEPCID) injection 20 mg       1.  Multiple Myeloma:    · Referred 11/14/2022 for leukocytosis and thrombocytosis by rheumatology, Dr. Ayala.  Diagnosed with Sjogren's by Dr. Ayala.  · History of anemia when she had a cerebral aneurysm clip done in 2019.  · Patient found to have serum protein electrophoresis that disclosed a monoclonal protein   · IgG monoclonal protein in the 2000 category, Bence-Moon protein in the urine, very small amount that is not quantifiable.  · Recommend patient  proceed with bone marrow testing.  · 1/24/2023 bone marrow biopsy  · Bone marrow biopsy showing 18% plasma cells infiltrating in the bone marrow. Also absence of iron stores. The patient had no evidence of myelofibrosis, lymphoma, leukemia, or myelodysplasia.   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 [place] red stain was negative for amyloid deposition  · Recommended treatment with combination of Darzalex once a week, Velcade subcutaneously once a week, 3 weeks out of 4, Revlimid 15 mg a day for 3 weeks out of a month and dexamethasone 20 mg once a week.  · Plan to treat the patient for the next 4 months following her monoclonal protein and eventually referred to [facility] for consideration of high-dose chemotherapy and stem cell transplant.  · No evidence of lytic lesions, therefore will not use Zometa or Xgeva at this time.  · 2/3/2023 cycle 1 Darzalex Faspro and Velcade.  · 02/10/2023 the patient was further reviewed the day that she comes for her 2nd Darzalex infusion and 2nd Velcade injection. So far the patient has not encountered any side effects of the medicines.  · Seen 2/24/2023 due for cycle 1 day 22.  Patient started her Revlimid 15 mg daily for 3 out of 4 weeks last Wednesday.  So far she is tolerating all of her treatment fairly well without any significant side effects other than ongoing fatigue.  · 3/10/2023 here for cycle 2, day 8 Velcade, Darzalex fast pro, continuing on Revlimid 15 mg daily for 3 weeks on, 1 week off as well as dexamethasone 20 mg weekly.  We will recheck paraprotein studies today.  On 03/17/2023,  She has had resolution of pain in her lower extremities.  Her monoclonal protein has dropped by half in only 1 month of therapy.  She questions referral to BMT, which will be made to Dr. Plasencia in May or June.  3/31/2023: C3D1 Darzalex and Velcade.  Continues Revlimid at home, currently on her second week on Revlimid.   Continues dexamethasone weekly.  Tolerating well.    2.  Nodular goiter: she has a normal TSH and a normal T4. I will let Beau Frye MD, the patient's Primary Physician address this issue eventually with an ultrasound and clinical examination. The patient has antithyroid antibody positivity and very likely in my opinion she probably has a component of Hashimoto's thyroiditis.   On 03/17/2023 her goiter is no different today than what it was during the original consultation. This will be watched in absence of any other intervention.    3.  Prophylaxis: recommend she start low-dose Xarelto 2.5 mg p.o. daily after she has had her molar extracted for initiation and before initiation of her Revlimid administration.   Continue taking low-dose Xarelto 2.5 mg a day for prevention of thrombosis associated with Revlimid.  Tolerating well without signs or symptoms of bleeding or DVT.      4.  Iron deficiency:  · given her absence of iron stores in her bone marrow she will require IV iron therapy. This could be accommodated at some point between all the infusions and treatments that she needs to have.  3/30/2023: Hemoglobin today 12.3.    5.  Sjogren's syndrome with myopathy:  · Followed by rheumatology  · On 03/17/2023 the patient has minimal symptomatology pertinent to this at this time besides minimal dryness in her eyes and oral mucosas.    6.  Skin rash-called 3/23/2023 to report skin rash to the bilateral cheeks, neck, spreading towards her shoulders.  She was advised to hold Bactrim.  She was also sent clindamycin gel which she started on Tuesday.  Skin rash is now only present to the bilateral cheeks and much improved.  She will continue clindamycin gel until resolution.  She will continue to hold Bactrim.    PLAN:   · C3D1 Darzalex and Velcade today.  · Continue Revlimid 15 mg daily days 1-21 every 28 days.  Currently on her second week on Revlimid.  · Continue dexamethasone weekly.  · Continue to hold  Bactrim.  · Continue clindamycin gel for skin rash.  · Return in 1 week for NP follow up and C3D8 Velcade.  · Monthly monoclonal protein studies.    Patient is on a high risk medication requiring close monitoring for toxicity.    Veronica Luther, NICOLA  03/31/2023

## 2023-03-31 ENCOUNTER — SPECIALTY PHARMACY (OUTPATIENT)
Dept: PHARMACY | Facility: HOSPITAL | Age: 53
End: 2023-03-31
Payer: COMMERCIAL

## 2023-03-31 ENCOUNTER — LAB (OUTPATIENT)
Dept: LAB | Facility: HOSPITAL | Age: 53
End: 2023-03-31
Payer: COMMERCIAL

## 2023-03-31 ENCOUNTER — OFFICE VISIT (OUTPATIENT)
Dept: ONCOLOGY | Facility: CLINIC | Age: 53
End: 2023-03-31
Payer: COMMERCIAL

## 2023-03-31 ENCOUNTER — INFUSION (OUTPATIENT)
Dept: ONCOLOGY | Facility: HOSPITAL | Age: 53
End: 2023-03-31
Payer: COMMERCIAL

## 2023-03-31 ENCOUNTER — OFFICE VISIT (OUTPATIENT)
Dept: PSYCHIATRY | Facility: HOSPITAL | Age: 53
End: 2023-03-31
Payer: COMMERCIAL

## 2023-03-31 VITALS
HEART RATE: 63 BPM | RESPIRATION RATE: 16 BRPM | WEIGHT: 195.9 LBS | TEMPERATURE: 97 F | BODY MASS INDEX: 32.64 KG/M2 | OXYGEN SATURATION: 97 % | SYSTOLIC BLOOD PRESSURE: 128 MMHG | DIASTOLIC BLOOD PRESSURE: 61 MMHG | HEIGHT: 65 IN

## 2023-03-31 DIAGNOSIS — M79.10 MUSCLE PAIN: ICD-10-CM

## 2023-03-31 DIAGNOSIS — C90.00 MULTIPLE MYELOMA NOT HAVING ACHIEVED REMISSION: Primary | ICD-10-CM

## 2023-03-31 DIAGNOSIS — F43.23 ADJUSTMENT DISORDER WITH MIXED ANXIETY AND DEPRESSED MOOD: Primary | ICD-10-CM

## 2023-03-31 DIAGNOSIS — E04.9 GOITER, NODULAR: ICD-10-CM

## 2023-03-31 DIAGNOSIS — R21 SKIN RASH: ICD-10-CM

## 2023-03-31 DIAGNOSIS — C90.00 MULTIPLE MYELOMA NOT HAVING ACHIEVED REMISSION: ICD-10-CM

## 2023-03-31 DIAGNOSIS — Z79.899 HIGH RISK MEDICATION USE: ICD-10-CM

## 2023-03-31 LAB
ALBUMIN SERPL-MCNC: 4.3 G/DL (ref 3.5–5.2)
ALBUMIN/GLOB SERPL: 1.5 G/DL (ref 1.1–2.4)
ALP SERPL-CCNC: 66 U/L (ref 38–116)
ALT SERPL W P-5'-P-CCNC: 18 U/L (ref 0–33)
ANION GAP SERPL CALCULATED.3IONS-SCNC: 11.9 MMOL/L (ref 5–15)
AST SERPL-CCNC: 17 U/L (ref 0–32)
B-HCG UR QL: NEGATIVE
BASOPHILS # BLD AUTO: 0.09 10*3/MM3 (ref 0–0.2)
BASOPHILS NFR BLD AUTO: 0.9 % (ref 0–1.5)
BILIRUB SERPL-MCNC: 0.2 MG/DL (ref 0.2–1.2)
BUN SERPL-MCNC: 9 MG/DL (ref 6–20)
BUN/CREAT SERPL: 12 (ref 7.3–30)
CALCIUM SPEC-SCNC: 9.5 MG/DL (ref 8.5–10.2)
CHLORIDE SERPL-SCNC: 101 MMOL/L (ref 98–107)
CO2 SERPL-SCNC: 25.1 MMOL/L (ref 22–29)
CREAT SERPL-MCNC: 0.75 MG/DL (ref 0.6–1.1)
DEPRECATED RDW RBC AUTO: 48.6 FL (ref 37–54)
EGFRCR SERPLBLD CKD-EPI 2021: 97.1 ML/MIN/1.73
EOSINOPHIL # BLD AUTO: 0.42 10*3/MM3 (ref 0–0.4)
EOSINOPHIL NFR BLD AUTO: 4.3 % (ref 0.3–6.2)
ERYTHROCYTE [DISTWIDTH] IN BLOOD BY AUTOMATED COUNT: 14.9 % (ref 12.3–15.4)
GLOBULIN UR ELPH-MCNC: 2.9 GM/DL (ref 1.8–3.5)
GLUCOSE SERPL-MCNC: 100 MG/DL (ref 74–124)
HCT VFR BLD AUTO: 37.6 % (ref 34–46.6)
HGB BLD-MCNC: 12.3 G/DL (ref 12–15.9)
IMM GRANULOCYTES # BLD AUTO: 0.04 10*3/MM3 (ref 0–0.05)
IMM GRANULOCYTES NFR BLD AUTO: 0.4 % (ref 0–0.5)
LYMPHOCYTES # BLD AUTO: 1.88 10*3/MM3 (ref 0.7–3.1)
LYMPHOCYTES NFR BLD AUTO: 19.2 % (ref 19.6–45.3)
MCH RBC QN AUTO: 29.3 PG (ref 26.6–33)
MCHC RBC AUTO-ENTMCNC: 32.7 G/DL (ref 31.5–35.7)
MCV RBC AUTO: 89.5 FL (ref 79–97)
MONOCYTES # BLD AUTO: 0.93 10*3/MM3 (ref 0.1–0.9)
MONOCYTES NFR BLD AUTO: 9.5 % (ref 5–12)
NEUTROPHILS NFR BLD AUTO: 6.42 10*3/MM3 (ref 1.7–7)
NEUTROPHILS NFR BLD AUTO: 65.7 % (ref 42.7–76)
NRBC BLD AUTO-RTO: 0 /100 WBC (ref 0–0.2)
PLATELET # BLD AUTO: 427 10*3/MM3 (ref 140–450)
PMV BLD AUTO: 9.8 FL (ref 6–12)
POTASSIUM SERPL-SCNC: 3.6 MMOL/L (ref 3.5–4.7)
PROT SERPL-MCNC: 7.2 G/DL (ref 6.3–8)
RBC # BLD AUTO: 4.2 10*6/MM3 (ref 3.77–5.28)
SODIUM SERPL-SCNC: 138 MMOL/L (ref 134–145)
WBC NRBC COR # BLD: 9.78 10*3/MM3 (ref 3.4–10.8)

## 2023-03-31 PROCEDURE — 25010000002 BORTEZOMIB PER 0.1 MG: Performed by: NURSE PRACTITIONER

## 2023-03-31 PROCEDURE — 80053 COMPREHEN METABOLIC PANEL: CPT

## 2023-03-31 PROCEDURE — 25010000002 DARATUMUMAB-HYALURONIDASE-FIHJ 1800-30000 MG-UT/15ML SOLUTION: Performed by: NURSE PRACTITIONER

## 2023-03-31 PROCEDURE — 99214 OFFICE O/P EST MOD 30 MIN: CPT | Performed by: NURSE PRACTITIONER

## 2023-03-31 PROCEDURE — 96401 CHEMO ANTI-NEOPL SQ/IM: CPT

## 2023-03-31 PROCEDURE — 63710000001 DIPHENHYDRAMINE PER 50 MG: Performed by: NURSE PRACTITIONER

## 2023-03-31 PROCEDURE — 85025 COMPLETE CBC W/AUTO DIFF WBC: CPT

## 2023-03-31 PROCEDURE — 81025 URINE PREGNANCY TEST: CPT | Performed by: NURSE PRACTITIONER

## 2023-03-31 RX ORDER — BORTEZOMIB 3.5 MG/1
1.3 INJECTION, POWDER, LYOPHILIZED, FOR SOLUTION INTRAVENOUS; SUBCUTANEOUS ONCE
Status: CANCELLED | OUTPATIENT
Start: 2023-03-31

## 2023-03-31 RX ORDER — DIPHENHYDRAMINE HYDROCHLORIDE 50 MG/ML
50 INJECTION INTRAMUSCULAR; INTRAVENOUS AS NEEDED
Status: CANCELLED | OUTPATIENT
Start: 2023-03-31

## 2023-03-31 RX ORDER — LENALIDOMIDE 15 MG/1
CAPSULE ORAL
Qty: 21 CAPSULE | Refills: 0 | Status: SHIPPED | OUTPATIENT
Start: 2023-03-31

## 2023-03-31 RX ORDER — FAMOTIDINE 20 MG/1
20 TABLET, FILM COATED ORAL ONCE
Status: CANCELLED
Start: 2023-03-31 | End: 2023-03-31

## 2023-03-31 RX ORDER — FAMOTIDINE 10 MG/ML
20 INJECTION, SOLUTION INTRAVENOUS AS NEEDED
Status: CANCELLED | OUTPATIENT
Start: 2023-03-31

## 2023-03-31 RX ORDER — ACETAMINOPHEN 500 MG
1000 TABLET ORAL ONCE
Status: COMPLETED | OUTPATIENT
Start: 2023-03-31 | End: 2023-03-31

## 2023-03-31 RX ORDER — BORTEZOMIB 3.5 MG/1
1.3 INJECTION, POWDER, LYOPHILIZED, FOR SOLUTION INTRAVENOUS; SUBCUTANEOUS ONCE
Status: COMPLETED | OUTPATIENT
Start: 2023-03-31 | End: 2023-03-31

## 2023-03-31 RX ORDER — SODIUM CHLORIDE 9 MG/ML
250 INJECTION, SOLUTION INTRAVENOUS ONCE
Status: CANCELLED | OUTPATIENT
Start: 2023-03-31

## 2023-03-31 RX ORDER — ACETAMINOPHEN 500 MG
1000 TABLET ORAL ONCE
Status: CANCELLED | OUTPATIENT
Start: 2023-03-31

## 2023-03-31 RX ORDER — DIPHENHYDRAMINE HCL 25 MG
25 CAPSULE ORAL ONCE
Status: CANCELLED | OUTPATIENT
Start: 2023-03-31

## 2023-03-31 RX ORDER — FAMOTIDINE 20 MG/1
20 TABLET, FILM COATED ORAL ONCE
Status: COMPLETED | OUTPATIENT
Start: 2023-03-31 | End: 2023-03-31

## 2023-03-31 RX ORDER — DIPHENHYDRAMINE HCL 25 MG
25 CAPSULE ORAL ONCE
Status: COMPLETED | OUTPATIENT
Start: 2023-03-31 | End: 2023-03-31

## 2023-03-31 RX ADMIN — ACETAMINOPHEN 1000 MG: 500 TABLET, FILM COATED ORAL at 09:33

## 2023-03-31 RX ADMIN — DARATUMUMAB AND HYALURONIDASE-FIHJ (HUMAN RECOMBINANT) 1800 MG: 1800; 30000 INJECTION SUBCUTANEOUS at 10:04

## 2023-03-31 RX ADMIN — BORTEZOMIB 2.5 MG: 3.5 INJECTION, POWDER, LYOPHILIZED, FOR SOLUTION INTRAVENOUS; SUBCUTANEOUS at 10:04

## 2023-03-31 RX ADMIN — DIPHENHYDRAMINE HYDROCHLORIDE 25 MG: 25 CAPSULE ORAL at 09:33

## 2023-03-31 RX ADMIN — FAMOTIDINE 20 MG: 20 TABLET ORAL at 09:33

## 2023-03-31 NOTE — NURSING NOTE
Pt has had a hysterectomy in 2017 per Veronica PETERSEN no need to wait on pregnancy test to result today before treating. Pt had a negative pregnancy test on the start of her treatment 2/3/23.

## 2023-03-31 NOTE — PROGRESS NOTES
Re: Refills of Oral Specialty Medication - Revlimid (lenalidomide)    • Drug-Drug Interactions: The current medication list was reviewed and there are no relevant drug-drug interactions.  • Medication Allergies: The patient has no relevant allergies as it relates to their oral specialty medication  • Review of Labs/Dose Adjustments: NO DOSE CHANGE - I reviewed the most recent note and labs and the patient will continue without any dose changes.  I sent refills as described below.    Drug: Revlimid (lenalidomide)  Strength: 15 mg  Directions: Take 1 capsule by mouth daily for 21 days on, then 7 days off, then repeat  Quantity: 21  Refills: 0  Pharmacy prescription sent to: [facility]    Name/Credentials Dinorah Hood RPh, BCOP    3/31/2023  08:44 EDT

## 2023-03-31 NOTE — PROGRESS NOTES
[facility]  In Person Session    Subjective  Patient ID: Yudy Hinton is a 52 y.o. female is seen face to face in the [facility].    CC: Situational anxiety, depression surrounding multiple myeloma    HPI/ Interval History:   Pt is seen alongside daughter with pt permission. Continues in treatment of multiple myeloma, seen in follow up regarding sx of depression, night terrors amidst medical PTSD with recent addition of prazosin; chart reviewed and appears this has not been added. Despite this, pt does report appreciation of improved sleep, now taking olanzapine 2.5 mg q hs. Denies persistence of any night terrors. Does note restless, achey legs, seemingly related to higher physical demands at work, and feels this continues to disrupt sleep a couple nights a week. Has met with dietitian. Weight is stable.    PHQ 9 reviewed with score of 6. Pt reports appreciation of response to current treatment, hopeful for sooner transplant, specifically regarding importance of allowing continuity of normal school/ stability for children. Continues to report positive support environment, close loved ones.      Pt continues to work full time, noting this is becoming more difficult. Does appreciate no overtime, working to listen to body to understand adjusted limitations. Acknowledges 'everyone says I'm doing good' which contributes to questions of whether she should be able to continue current work, etc. Reports importance of continued employment, although with challenges feeling as though it is taking all of her energy and not leaving any to enjoy time with family.     Pt reports challenges with vision, attributing this to sjrogrens sx. Has increased use of drops on Saturday/ Sunday although not yet noticing any improvement.    Exam: As above    Recent Labs Reviewed:  Infusion on 03/31/2023   Component Date Value   • Glucose 03/31/2023 100    • BUN 03/31/2023 9    • Creatinine  03/31/2023 0.75    • Sodium 03/31/2023 138    • Potassium 03/31/2023 3.6    • Chloride 03/31/2023 101    • CO2 03/31/2023 25.1    • Calcium 03/31/2023 9.5    • Total Protein 03/31/2023 7.2    • Albumin 03/31/2023 4.3    • ALT (SGPT) 03/31/2023 18    • AST (SGOT) 03/31/2023 17    • Alkaline Phosphatase 03/31/2023 66    • Total Bilirubin 03/31/2023 0.2    • Globulin 03/31/2023 2.9    • A/G Ratio 03/31/2023 1.5    • BUN/Creatinine Ratio 03/31/2023 12.0    • Anion Gap 03/31/2023 11.9    • eGFR 03/31/2023 97.1    • WBC 03/31/2023 9.78    • RBC 03/31/2023 4.20    • Hemoglobin 03/31/2023 12.3    • Hematocrit 03/31/2023 37.6    • MCV 03/31/2023 89.5    • MCH 03/31/2023 29.3    • MCHC 03/31/2023 32.7    • RDW 03/31/2023 14.9    • RDW-SD 03/31/2023 48.6    • MPV 03/31/2023 9.8    • Platelets 03/31/2023 427    • Neutrophil % 03/31/2023 65.7    • Lymphocyte % 03/31/2023 19.2 (L)    • Monocyte % 03/31/2023 9.5    • Eosinophil % 03/31/2023 4.3    • Basophil % 03/31/2023 0.9    • Immature Grans % 03/31/2023 0.4    • Neutrophils, Absolute 03/31/2023 6.42    • Lymphocytes, Absolute 03/31/2023 1.88    • Monocytes, Absolute 03/31/2023 0.93 (H)    • Eosinophils, Absolute 03/31/2023 0.42 (H)    • Basophils, Absolute 03/31/2023 0.09    • Immature Grans, Absolute 03/31/2023 0.04    • nRBC 03/31/2023 0.0    • HCG, Urine QL 03/31/2023 Negative    Lab on 03/24/2023   Component Date Value   • Glucose 03/24/2023 81    • BUN 03/24/2023 13    • Creatinine 03/24/2023 0.80    • Sodium 03/24/2023 140    • Potassium 03/24/2023 4.4    • Chloride 03/24/2023 103    • CO2 03/24/2023 24.9    • Calcium 03/24/2023 9.8    • Total Protein 03/24/2023 7.2    • Albumin 03/24/2023 4.4    • ALT (SGPT) 03/24/2023 20    • AST (SGOT) 03/24/2023 20    • Alkaline Phosphatase 03/24/2023 75    • Total Bilirubin 03/24/2023 0.2    • Globulin 03/24/2023 2.8    • A/G Ratio 03/24/2023 1.6    • BUN/Creatinine Ratio 03/24/2023 16.3    • Anion Gap 03/24/2023 12.1    • eGFR  03/24/2023 89.9    • WBC 03/24/2023 7.99    • RBC 03/24/2023 4.30    • Hemoglobin 03/24/2023 12.5    • Hematocrit 03/24/2023 38.5    • MCV 03/24/2023 89.5    • MCH 03/24/2023 29.1    • MCHC 03/24/2023 32.5    • RDW 03/24/2023 15.3    • RDW-SD 03/24/2023 49.8    • MPV 03/24/2023 10.5    • Platelets 03/24/2023 414    • Neutrophil % 03/24/2023 59.9    • Lymphocyte % 03/24/2023 29.0    • Monocyte % 03/24/2023 7.1    • Eosinophil % 03/24/2023 3.1    • Basophil % 03/24/2023 0.5    • Immature Grans % 03/24/2023 0.4    • Neutrophils, Absolute 03/24/2023 4.78    • Lymphocytes, Absolute 03/24/2023 2.32    • Monocytes, Absolute 03/24/2023 0.57    • Eosinophils, Absolute 03/24/2023 0.25    • Basophils, Absolute 03/24/2023 0.04    • Immature Grans, Absolute 03/24/2023 0.03    • nRBC 03/24/2023 0.0    Lab on 03/17/2023   Component Date Value   • Glucose 03/17/2023 106    • BUN 03/17/2023 14    • Creatinine 03/17/2023 0.83    • Sodium 03/17/2023 142    • Potassium 03/17/2023 3.8    • Chloride 03/17/2023 106    • CO2 03/17/2023 21.0 (L)    • Calcium 03/17/2023 9.4    • Total Protein 03/17/2023 7.7    • Albumin 03/17/2023 4.5    • ALT (SGPT) 03/17/2023 20    • AST (SGOT) 03/17/2023 18    • Alkaline Phosphatase 03/17/2023 73    • Total Bilirubin 03/17/2023 0.2    • Globulin 03/17/2023 3.2    • A/G Ratio 03/17/2023 1.4    • BUN/Creatinine Ratio 03/17/2023 16.9    • Anion Gap 03/17/2023 15.0    • eGFR 03/17/2023 86.0    • WBC 03/17/2023 8.40    • RBC 03/17/2023 4.38    • Hemoglobin 03/17/2023 12.3    • Hematocrit 03/17/2023 39.8    • MCV 03/17/2023 90.9    • MCH 03/17/2023 28.1    • MCHC 03/17/2023 30.9 (L)    • RDW 03/17/2023 15.0    • RDW-SD 03/17/2023 50.0    • MPV 03/17/2023 9.6    • Platelets 03/17/2023 544 (H)    • Neutrophil % 03/17/2023 48.3    • Lymphocyte % 03/17/2023 37.7    • Monocyte % 03/17/2023 12.5 (H)    • Eosinophil % 03/17/2023 0.5    • Basophil % 03/17/2023 0.4    • Immature Grans % 03/17/2023 0.6 (H)    •  Neutrophils, Absolute 03/17/2023 4.06    • Lymphocytes, Absolute 03/17/2023 3.17 (H)    • Monocytes, Absolute 03/17/2023 1.05 (H)    • Eosinophils, Absolute 03/17/2023 0.04    • Basophils, Absolute 03/17/2023 0.03    • Immature Grans, Absolute 03/17/2023 0.05    • nRBC 03/17/2023 0.0    • IgG 03/17/2023 1270    • IgA 03/17/2023 69 (L)    • IgM 03/17/2023 53    • Total Protein 03/17/2023 7.4    • Albumin 03/17/2023 3.9    • Alpha-1-Globulin 03/17/2023 0.2    • Alpha-2-Globulin 03/17/2023 1.1 (H)    • Beta Globulin 03/17/2023 1.0    • Gamma Globulin 03/17/2023 1.3    • M-Alvarez 03/17/2023 0.6 (H)    • Globulin 03/17/2023 3.5    • A/G Ratio 03/17/2023 1.2    • Immunofixation Reflex, S* 03/17/2023 Comment (A)    • Please note 03/17/2023 Comment    • Free Light Chain, Kappa 03/17/2023 28.8 (H)    • Free Lambda Light Chains 03/17/2023 10.1    • Kappa/Lambda Ratio 03/17/2023 2.85 (H)    Office Visit on 03/10/2023   Component Date Value   • IgG 03/10/2023 1330    • IgA 03/10/2023 74 (L)    • IgM 03/10/2023 47    • Total Protein 03/10/2023 7.5    • Albumin 03/10/2023 3.7    • Alpha-1-Globulin 03/10/2023 0.3    • Alpha-2-Globulin 03/10/2023 1.1 (H)    • Beta Globulin 03/10/2023 1.1    • Gamma Globulin 03/10/2023 1.4    • M-Alvarez 03/10/2023 0.7 (H)    • Globulin 03/10/2023 3.8    • A/G Ratio 03/10/2023 1.0    • Immunofixation Reflex, S* 03/10/2023 Comment (A)    • Please note 03/10/2023 Comment    • Free Light Chain, [place] 03/10/2023 22.5 (H)    • Free Lambda Light Chains 03/10/2023 12.9    • Kappa/Lambda Ratio 03/10/2023 1.74 (H)    Lab on 03/10/2023   Component Date Value   • WBC 03/10/2023 7.24    • RBC 03/10/2023 4.33    • Hemoglobin 03/10/2023 12.5    • Hematocrit 03/10/2023 37.6    • MCV 03/10/2023 86.8    • MCH 03/10/2023 28.9    • MCHC 03/10/2023 33.2    • RDW 03/10/2023 14.8    • RDW-SD 03/10/2023 47.0    • MPV 03/10/2023 10.0    • Platelets 03/10/2023 394    • Neutrophil % 03/10/2023 60.7    • Lymphocyte % 03/10/2023  27.3    • Monocyte % 03/10/2023 9.3    • Eosinophil % 03/10/2023 1.7    • Basophil % 03/10/2023 0.3    • Immature Grans % 03/10/2023 0.7 (H)    • Neutrophils, Absolute 03/10/2023 4.40    • Lymphocytes, Absolute 03/10/2023 1.98    • Monocytes, Absolute 03/10/2023 0.67    • Eosinophils, Absolute 03/10/2023 0.12    • Basophils, Absolute 03/10/2023 0.02    • Immature Grans, Absolute 03/10/2023 0.05    • nRBC 03/10/2023 0.0    Lab on 03/03/2023   Component Date Value   • Glucose 03/03/2023 106    • BUN 03/03/2023 9    • Creatinine 03/03/2023 0.78    • Sodium 03/03/2023 138    • Potassium 03/03/2023 4.1    • Chloride 03/03/2023 101    • CO2 03/03/2023 22.4    • Calcium 03/03/2023 9.7    • Total Protein 03/03/2023 7.8    • Albumin 03/03/2023 4.5    • ALT (SGPT) 03/03/2023 22    • AST (SGOT) 03/03/2023 19    • Alkaline Phosphatase 03/03/2023 77    • Total Bilirubin 03/03/2023 0.3    • Globulin 03/03/2023 3.3    • A/G Ratio 03/03/2023 1.4    • BUN/Creatinine Ratio 03/03/2023 11.5    • Anion Gap 03/03/2023 14.6    • eGFR 03/03/2023 92.7    • WBC 03/03/2023 8.03    • RBC 03/03/2023 4.45    • Hemoglobin 03/03/2023 12.8    • Hematocrit 03/03/2023 39.5    • MCV 03/03/2023 88.8    • MCH 03/03/2023 28.8    • MCHC 03/03/2023 32.4    • RDW 03/03/2023 14.8    • RDW-SD 03/03/2023 47.8    • MPV 03/03/2023 9.6    • Platelets 03/03/2023 331    • Neutrophil % 03/03/2023 82.7 (H)    • Lymphocyte % 03/03/2023 11.6 (L)    • Monocyte % 03/03/2023 4.6 (L)    • Eosinophil % 03/03/2023 0.5    • Basophil % 03/03/2023 0.2    • Immature Grans % 03/03/2023 0.4    • Neutrophils, Absolute 03/03/2023 6.64    • Lymphocytes, Absolute 03/03/2023 0.93    • Monocytes, Absolute 03/03/2023 0.37    • Eosinophils, Absolute 03/03/2023 0.04    • Basophils, Absolute 03/03/2023 0.02    • Immature Grans, Absolute 03/03/2023 0.03    • nRBC 03/03/2023 0.0    Lab on 02/24/2023   Component Date Value   • Glucose 02/24/2023 169 (H)    • BUN 02/24/2023 9    • Creatinine  02/24/2023 0.70    • Sodium 02/24/2023 138    • Potassium 02/24/2023 4.0    • Chloride 02/24/2023 100    • CO2 02/24/2023 19.6 (L)    • Calcium 02/24/2023 10.2    • Total Protein 02/24/2023 8.2 (H)    • Albumin 02/24/2023 4.6    • ALT (SGPT) 02/24/2023 37 (H)    • AST (SGOT) 02/24/2023 32    • Alkaline Phosphatase 02/24/2023 83    • Total Bilirubin 02/24/2023 0.2    • Globulin 02/24/2023 3.6 (H)    • A/G Ratio 02/24/2023 1.3    • BUN/Creatinine Ratio 02/24/2023 12.9    • Anion Gap 02/24/2023 18.4 (H)    • eGFR 02/24/2023 105.5    • WBC 02/24/2023 10.18    • RBC 02/24/2023 4.56    • Hemoglobin 02/24/2023 12.7    • Hematocrit 02/24/2023 40.7    • MCV 02/24/2023 89.3    • MCH 02/24/2023 27.9    • MCHC 02/24/2023 31.2 (L)    • RDW 02/24/2023 14.7    • RDW-SD 02/24/2023 47.6    • MPV 02/24/2023 10.0    • Platelets 02/24/2023 412    • Neutrophil % 02/24/2023 92.8 (H)    • Lymphocyte % 02/24/2023 5.6 (L)    • Monocyte % 02/24/2023 1.0 (L)    • Eosinophil % 02/24/2023 0.0 (L)    • Basophil % 02/24/2023 0.1    • Immature Grans % 02/24/2023 0.5    • Neutrophils, Absolute 02/24/2023 9.45 (H)    • Lymphocytes, Absolute 02/24/2023 0.57 (L)    • Monocytes, Absolute 02/24/2023 0.10    • Eosinophils, Absolute 02/24/2023 0.00    • Basophils, Absolute 02/24/2023 0.01    • Immature Grans, Absolute 02/24/2023 0.05    • nRBC 02/24/2023 0.0    Lab on 02/17/2023   Component Date Value   • Glucose 02/17/2023 105    • BUN 02/17/2023 12    • Creatinine 02/17/2023 1.03    • Sodium 02/17/2023 137    • Potassium 02/17/2023 4.7    • Chloride 02/17/2023 104    • CO2 02/17/2023 20.6 (L)    • Calcium 02/17/2023 10.0    • Total Protein 02/17/2023 7.8    • Albumin 02/17/2023 4.4    • ALT (SGPT) 02/17/2023 22    • AST (SGOT) 02/17/2023 17    • Alkaline Phosphatase 02/17/2023 78    • Total Bilirubin 02/17/2023 <0.2 (L)    • Globulin 02/17/2023 3.4    • A/G Ratio 02/17/2023 1.3    • BUN/Creatinine Ratio 02/17/2023 11.7    • Anion Gap 02/17/2023 12.4     • eGFR 02/17/2023 66.4    • WBC 02/17/2023 12.91 (H)    • RBC 02/17/2023 4.35    • Hemoglobin 02/17/2023 12.5    • Hematocrit 02/17/2023 38.3    • MCV 02/17/2023 88.0    • MCH 02/17/2023 28.7    • MCHC 02/17/2023 32.6    • RDW 02/17/2023 14.5    • RDW-SD 02/17/2023 46.0    • MPV 02/17/2023 9.3    • Platelets 02/17/2023 444    • Neutrophil % 02/17/2023 82.1 (H)    • Lymphocyte % 02/17/2023 10.6 (L)    • Monocyte % 02/17/2023 4.7 (L)    • Eosinophil % 02/17/2023 1.5    • Basophil % 02/17/2023 0.2    • Immature Grans % 02/17/2023 0.9 (H)    • Neutrophils, Absolute 02/17/2023 10.60 (H)    • Lymphocytes, Absolute 02/17/2023 1.37    • Monocytes, Absolute 02/17/2023 0.61    • Eosinophils, Absolute 02/17/2023 0.19    • Basophils, Absolute 02/17/2023 0.03    • Immature Grans, Absolute 02/17/2023 0.11 (H)    • nRBC 02/17/2023 0.0    Infusion on 02/10/2023   Component Date Value   • Glucose 02/10/2023 110    • BUN 02/10/2023 15    • Creatinine 02/10/2023 0.72    • Sodium 02/10/2023 135    • Potassium 02/10/2023 4.2    • Chloride 02/10/2023 103    • CO2 02/10/2023 20.3 (L)    • Calcium 02/10/2023 9.3    • Total Protein 02/10/2023 8.2 (H)    • Albumin 02/10/2023 4.3    • ALT (SGPT) 02/10/2023 20    • AST (SGOT) 02/10/2023 21    • Alkaline Phosphatase 02/10/2023 84    • Total Bilirubin 02/10/2023 <0.2 (L)    • Globulin 02/10/2023 3.9 (H)    • A/G Ratio 02/10/2023 1.1    • BUN/Creatinine Ratio 02/10/2023 20.8    • Anion Gap 02/10/2023 11.7    • eGFR 02/10/2023 102.0    • WBC 02/10/2023 10.79    • RBC 02/10/2023 4.35    • Hemoglobin 02/10/2023 12.6    • Hematocrit 02/10/2023 38.2    • MCV 02/10/2023 87.8    • MCH 02/10/2023 29.0    • MCHC 02/10/2023 33.0    • RDW 02/10/2023 14.0    • RDW-SD 02/10/2023 45.4    • MPV 02/10/2023 9.2    • Platelets 02/10/2023 435    • Neutrophil % 02/10/2023 77.9 (H)    • Lymphocyte % 02/10/2023 14.1 (L)    • Monocyte % 02/10/2023 5.6    • Eosinophil % 02/10/2023 1.5    • Basophil % 02/10/2023 0.3     • Immature Grans % 02/10/2023 0.6 (H)    • Neutrophils, Absolute 02/10/2023 8.42 (H)    • Lymphocytes, Absolute 02/10/2023 1.52    • Monocytes, Absolute 02/10/2023 0.60    • Eosinophils, Absolute 02/10/2023 0.16    • Basophils, Absolute 02/10/2023 0.03    • Immature Grans, Absolute 02/10/2023 0.06 (H)    • nRBC 02/10/2023 0.0    Infusion on 02/03/2023   Component Date Value   • Glucose 02/03/2023 142 (H)    • BUN 02/03/2023 13    • Creatinine 02/03/2023 0.92    • Sodium 02/03/2023 136    • Potassium 02/03/2023 3.8    • Chloride 02/03/2023 100    • CO2 02/03/2023 20.3 (L)    • Calcium 02/03/2023 9.8    • Total Protein 02/03/2023 9.3 (H)    • Albumin 02/03/2023 4.7    • ALT (SGPT) 02/03/2023 24    • AST (SGOT) 02/03/2023 31    • Alkaline Phosphatase 02/03/2023 84    • Total Bilirubin 02/03/2023 0.2    • Globulin 02/03/2023 4.6 (H)    • A/G Ratio 02/03/2023 1.0 (L)    • BUN/Creatinine Ratio 02/03/2023 14.1    • Anion Gap 02/03/2023 15.7 (H)    • eGFR 02/03/2023 76.0    • HCG, Urine QL 02/03/2023 Negative    • WBC 02/03/2023 11.43 (H)    • RBC 02/03/2023 4.61    • Hemoglobin 02/03/2023 13.3    • Hematocrit 02/03/2023 40.4    • MCV 02/03/2023 87.6    • MCH 02/03/2023 28.9    • MCHC 02/03/2023 32.9    • RDW 02/03/2023 13.9    • RDW-SD 02/03/2023 44.2    • MPV 02/03/2023 9.4    • Platelets 02/03/2023 541 (H)    • Neutrophil % 02/03/2023 88.1 (H)    • Lymphocyte % 02/03/2023 9.5 (L)    • Monocyte % 02/03/2023 1.0 (L)    • Eosinophil % 02/03/2023 0.1 (L)    • Basophil % 02/03/2023 0.3    • Immature Grans % 02/03/2023 1.0 (H)    • Neutrophils, Absolute 02/03/2023 10.06 (H)    • Lymphocytes, Absolute 02/03/2023 1.09    • Monocytes, Absolute 02/03/2023 0.12    • Eosinophils, Absolute 02/03/2023 0.01    • Basophils, Absolute 02/03/2023 0.03    • Immature Grans, Absolute 02/03/2023 0.12 (H)    • nRBC 02/03/2023 0.0    • ABO Type 02/03/2023 O    • RH type 02/03/2023 Positive    • Antibody Screen 02/03/2023 Negative    • T&S  Expiration Date 02/03/2023 2/4/2023 11:59:00 PM    • K antigen 02/03/2023 Negative    There may be more visits with results that are not included.   labs reviewed    Current Psychotropic Medications:  Prazosin 1-2 mg q hs - never prescribed  Olanzapine 2.5 mg q hs    Plan of Care/ Medical Decision Making  Pt continues with stable, subthreshold report of depressive sx. Will remain off medication at this time.   Supported continuation of low dose olanzapine; did briefly consider metabolic effects, importance for monitoring.   Sleep, fatigue, restlessness reviewed; considered importance of current employment, although with challenges managing fatigue. Conversation guided regarding potential benefit of further work restriction. 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 Considers energy conservation strategies, opportunity for activity restriction allowing light duty/ computer use instead of current physical labor. Pt would like to continue full time work.  Follow up arranged in 4 weeks.    Diagnoses and all orders for this visit:    1. Adjustment disorder with mixed anxiety and depressed mood (Primary)    2. Multiple myeloma not having achieved remission (HCC)      I spent 35 minutes caring for Yudy on this date of service.

## 2023-04-03 ENCOUNTER — SPECIALTY PHARMACY (OUTPATIENT)
Dept: PHARMACY | Facility: HOSPITAL | Age: 53
End: 2023-04-03
Payer: COMMERCIAL

## 2023-04-07 ENCOUNTER — APPOINTMENT (OUTPATIENT)
Dept: ONCOLOGY | Facility: HOSPITAL | Age: 53
End: 2023-04-07
Payer: COMMERCIAL

## 2023-04-07 ENCOUNTER — INFUSION (OUTPATIENT)
Dept: ONCOLOGY | Facility: HOSPITAL | Age: 53
End: 2023-04-07
Payer: COMMERCIAL

## 2023-04-07 ENCOUNTER — LAB (OUTPATIENT)
Dept: LAB | Facility: HOSPITAL | Age: 53
End: 2023-04-07
Payer: COMMERCIAL

## 2023-04-07 ENCOUNTER — OFFICE VISIT (OUTPATIENT)
Dept: ONCOLOGY | Facility: CLINIC | Age: 53
End: 2023-04-07
Payer: COMMERCIAL

## 2023-04-07 VITALS
WEIGHT: 197.7 LBS | TEMPERATURE: 98.4 F | SYSTOLIC BLOOD PRESSURE: 149 MMHG | HEIGHT: 65 IN | OXYGEN SATURATION: 98 % | DIASTOLIC BLOOD PRESSURE: 99 MMHG | RESPIRATION RATE: 16 BRPM | HEART RATE: 74 BPM | BODY MASS INDEX: 32.94 KG/M2

## 2023-04-07 DIAGNOSIS — M79.10 MUSCLE PAIN: ICD-10-CM

## 2023-04-07 DIAGNOSIS — C90.00 MULTIPLE MYELOMA NOT HAVING ACHIEVED REMISSION: Primary | ICD-10-CM

## 2023-04-07 DIAGNOSIS — E04.9 GOITER, NODULAR: ICD-10-CM

## 2023-04-07 DIAGNOSIS — D75.839 THROMBOCYTOSIS: ICD-10-CM

## 2023-04-07 DIAGNOSIS — C90.00 MULTIPLE MYELOMA NOT HAVING ACHIEVED REMISSION: ICD-10-CM

## 2023-04-07 LAB
ALBUMIN SERPL-MCNC: 4.2 G/DL (ref 3.5–5.2)
ALBUMIN/GLOB SERPL: 1.4 G/DL (ref 1.1–2.4)
ALP SERPL-CCNC: 61 U/L (ref 38–116)
ALT SERPL W P-5'-P-CCNC: 19 U/L (ref 0–33)
ANION GAP SERPL CALCULATED.3IONS-SCNC: 11 MMOL/L (ref 5–15)
AST SERPL-CCNC: 19 U/L (ref 0–32)
BASOPHILS # BLD AUTO: 0.03 10*3/MM3 (ref 0–0.2)
BASOPHILS NFR BLD AUTO: 0.4 % (ref 0–1.5)
BILIRUB SERPL-MCNC: 0.2 MG/DL (ref 0.2–1.2)
BUN SERPL-MCNC: 10 MG/DL (ref 6–20)
BUN/CREAT SERPL: 12.5 (ref 7.3–30)
CALCIUM SPEC-SCNC: 9.7 MG/DL (ref 8.5–10.2)
CHLORIDE SERPL-SCNC: 105 MMOL/L (ref 98–107)
CO2 SERPL-SCNC: 24 MMOL/L (ref 22–29)
CREAT SERPL-MCNC: 0.8 MG/DL (ref 0.6–1.1)
DEPRECATED RDW RBC AUTO: 47.8 FL (ref 37–54)
EGFRCR SERPLBLD CKD-EPI 2021: 89.9 ML/MIN/1.73
EOSINOPHIL # BLD AUTO: 0.5 10*3/MM3 (ref 0–0.4)
EOSINOPHIL NFR BLD AUTO: 7.4 % (ref 0.3–6.2)
ERYTHROCYTE [DISTWIDTH] IN BLOOD BY AUTOMATED COUNT: 14.8 % (ref 12.3–15.4)
GLOBULIN UR ELPH-MCNC: 3 GM/DL (ref 1.8–3.5)
GLUCOSE SERPL-MCNC: 99 MG/DL (ref 74–124)
HCT VFR BLD AUTO: 38 % (ref 34–46.6)
HGB BLD-MCNC: 12.4 G/DL (ref 12–15.9)
IMM GRANULOCYTES # BLD AUTO: 0.02 10*3/MM3 (ref 0–0.05)
IMM GRANULOCYTES NFR BLD AUTO: 0.3 % (ref 0–0.5)
LYMPHOCYTES # BLD AUTO: 1.55 10*3/MM3 (ref 0.7–3.1)
LYMPHOCYTES NFR BLD AUTO: 22.8 % (ref 19.6–45.3)
MCH RBC QN AUTO: 28.8 PG (ref 26.6–33)
MCHC RBC AUTO-ENTMCNC: 32.6 G/DL (ref 31.5–35.7)
MCV RBC AUTO: 88.2 FL (ref 79–97)
MONOCYTES # BLD AUTO: 0.98 10*3/MM3 (ref 0.1–0.9)
MONOCYTES NFR BLD AUTO: 14.4 % (ref 5–12)
NEUTROPHILS NFR BLD AUTO: 3.72 10*3/MM3 (ref 1.7–7)
NEUTROPHILS NFR BLD AUTO: 54.7 % (ref 42.7–76)
NRBC BLD AUTO-RTO: 0 /100 WBC (ref 0–0.2)
PLATELET # BLD AUTO: 343 10*3/MM3 (ref 140–450)
PMV BLD AUTO: 9.9 FL (ref 6–12)
POTASSIUM SERPL-SCNC: 4.3 MMOL/L (ref 3.5–4.7)
PROT SERPL-MCNC: 7.2 G/DL (ref 6.3–8)
RBC # BLD AUTO: 4.31 10*6/MM3 (ref 3.77–5.28)
SODIUM SERPL-SCNC: 140 MMOL/L (ref 134–145)
WBC NRBC COR # BLD: 6.8 10*3/MM3 (ref 3.4–10.8)

## 2023-04-07 PROCEDURE — 85025 COMPLETE CBC W/AUTO DIFF WBC: CPT

## 2023-04-07 PROCEDURE — 96401 CHEMO ANTI-NEOPL SQ/IM: CPT

## 2023-04-07 PROCEDURE — 36415 COLL VENOUS BLD VENIPUNCTURE: CPT

## 2023-04-07 PROCEDURE — 25010000002 BORTEZOMIB PER 0.1 MG: Performed by: NURSE PRACTITIONER

## 2023-04-07 PROCEDURE — 99214 OFFICE O/P EST MOD 30 MIN: CPT | Performed by: NURSE PRACTITIONER

## 2023-04-07 PROCEDURE — 80053 COMPREHEN METABOLIC PANEL: CPT

## 2023-04-07 RX ORDER — BORTEZOMIB 3.5 MG/1
1.3 INJECTION, POWDER, LYOPHILIZED, FOR SOLUTION INTRAVENOUS; SUBCUTANEOUS ONCE
Status: COMPLETED | OUTPATIENT
Start: 2023-04-07 | End: 2023-04-07

## 2023-04-07 RX ORDER — DIPHENHYDRAMINE HYDROCHLORIDE 50 MG/ML
50 INJECTION INTRAMUSCULAR; INTRAVENOUS AS NEEDED
Status: CANCELLED | OUTPATIENT
Start: 2023-04-07

## 2023-04-07 RX ORDER — MEPERIDINE HYDROCHLORIDE 25 MG/ML
25 INJECTION INTRAMUSCULAR; INTRAVENOUS; SUBCUTANEOUS
Status: CANCELLED | OUTPATIENT
Start: 2023-04-07

## 2023-04-07 RX ORDER — FAMOTIDINE 10 MG/ML
20 INJECTION, SOLUTION INTRAVENOUS AS NEEDED
Status: CANCELLED | OUTPATIENT
Start: 2023-04-07

## 2023-04-07 RX ORDER — BORTEZOMIB 3.5 MG/1
1.3 INJECTION, POWDER, LYOPHILIZED, FOR SOLUTION INTRAVENOUS; SUBCUTANEOUS ONCE
Status: CANCELLED | OUTPATIENT
Start: 2023-04-07

## 2023-04-07 RX ADMIN — BORTEZOMIB 2.5 MG: 3.5 INJECTION, POWDER, LYOPHILIZED, FOR SOLUTION INTRAVENOUS; SUBCUTANEOUS at 12:43

## 2023-04-07 NOTE — PROGRESS NOTES
Subjective        REASONS FOR FOLLOWUP:    1. 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 NO ANEMIA, NORMAL CREATININE,NORMAL CALCIUM, NO BONE PAIN NO ADENOPATHIES , NORMAL DIFFERENTIAL IN WBC.  2. LEUKOCYTOSIS AND THROMBOCYTOSIS.  3. GOITER VERY LIKELY HASHIMOTO'S THYROIDITIS.  4. SJOGREN'S SYNDROME.      HISTORY OF PRESENT ILLNESS:  Patient returns today 4/7/2023 for lab review and evaluation prior to cycle 3-day 8 Velcade.  She reports that the rash on her face has improved with the use of the clindamycin gel.  Patient does report that she has had some increase in the pain in her legs which occurred after she had been off of work for 4 days, and then return back to work.  She denies any worsening swelling or neuropathy symptoms.  She continues on Revlimid 15 mg daily for 21 out of 28 days.  She reports that she is currently in the middle of her Revlimid cycle.    Patient states that she has noticed a weird sensation on the left side of her face.  This was the same side that she previously had her brain tumor she states.  She denies any issues with headaches, blurred vision, double vision.  She is scheduled to follow-up with neurology in May.    Past Medical History:   Diagnosis Date   • Achilles tendonitis    • Anemia    • Anxiety    • Arthritis     ankles, shoulders   • Asthma     childhood   • Benign essential hypertension    • Cerebral aneurysm    • GERD (gastroesophageal reflux disease)    • Headache    • History of anemia    • History of E. coli septicemia    • Multiple myeloma 2023   • Rotator cuff syndrome    • Seasonal allergies         Past Surgical History:   Procedure Laterality Date   • ABSCESS DRAINAGE  2000    Renal abscess   • CEREBRAL ANGIOGRAM  2020   • CRANIOTOMY  2020    with angiogram for aneurysm   • HYSTERECTOMY  2017    partial, both ovaries remain   • ROTATOR CUFF REPAIR Right 2012   • SHOULDER SURGERY          Current Outpatient Medications on File Prior to  Visit   Medication Sig Dispense Refill   • acyclovir (ZOVIRAX) 400 MG tablet Take 1 tablet by mouth 2 (Two) Times a Day. 60 tablet 3   • albuterol sulfate  (90 Base) MCG/ACT inhaler Ventolin HFA 90 mcg/actuation aerosol inhaler     • amLODIPine (NORVASC) 5 MG tablet      • clindamycin (Clindagel) 1 % gel Apply 1 application topically to the appropriate area as directed 2 (Two) Times a Day. 30 g 0   • dexamethasone (DECADRON) 4 MG tablet Take 5 tablets by mouth 1 (One) Time Per Week. Take in the morning with food. 20 tablet 3   • Emgality 120 MG/ML auto-injector pen INJECT 1 ML SUBCUTANEOUSLY ONCE EVERY MONTH     • lenalidomide (Revlimid) 15 MG capsule TAKE 1 CAPSULE BY MOUTH ONCE DAILY FOR 21 DAYS ON AND 7 DAYS OFF 21 capsule 0   • levothyroxine (SYNTHROID, LEVOTHROID) 25 MCG tablet      • metoprolol tartrate (LOPRESSOR) 50 MG tablet Take 1 tablet by mouth Every Morning.     • OLANZapine (ZyPREXA) 2.5 MG tablet Take 1 tablet by mouth Every Night. 30 tablet 3   • ondansetron (ZOFRAN) 8 MG tablet Take 1 tablet by mouth 3 (Three) Times a Day As Needed for Nausea or Vomiting. 30 tablet 5   • Rimegepant Sulfate (NURTEC) 75 MG tablet dispersible tablet Take 1 tablet by mouth Daily As Needed.     • Rivaroxaban (Xarelto) 2.5 MG tablet Take 1 tablet by mouth Daily. 30 tablet 2   • sulfamethoxazole-trimethoprim (BACTRIM DS,SEPTRA DS) 800-160 MG per tablet Take 1 tablet by mouth 3 (Three) Times a Week. Take 1 tablet on Mondays, Wednesdays and Fridays. 12 tablet 3     No current facility-administered medications on file prior to visit.        ALLERGIES:    Allergies   Allergen Reactions   • Coconut Shortness Of Breath   • Eggs Or Egg-Derived Products Diarrhea        Social History     Socioeconomic History   • Marital status:    • Number of children: 3   Tobacco Use   • Smoking status: Never   • Smokeless tobacco: Never   Substance and Sexual Activity   • Alcohol use: Yes   • Drug use: Never        Family History  "  Problem Relation Age of Onset   • Hypertension Father    • Multiple sclerosis Brother    • Diabetes Brother    • Colon cancer Paternal Aunt    • Heart attack Maternal Grandmother    • Heart disease Maternal Grandmother    • Pancreatic cancer Maternal Grandfather    • Colon cancer Paternal Grandmother    • Heart attack Paternal Grandfather    • Cancer Paternal Grandfather       Objective     Vitals:    04/07/23 1146   BP: 149/99   Pulse: 74   Resp: 16   Temp: 98.4 °F (36.9 °C)   TempSrc: Temporal   SpO2: 98%   Weight: 89.7 kg (197 lb 11.2 oz)   Height: 165.1 cm (65\")   PainSc:   5   PainLoc: Leg  Comment: PT was off work for about 4 days and when she went back, she felt bad pain in her legs     Current Status 4/7/2023   ECOG score 0     Physical Exam  Vitals reviewed.   HENT:      Head: Normocephalic.      Nose: Nose normal.      Mouth/Throat:      Mouth: Mucous membranes are moist.      Pharynx: Oropharynx is clear.   Eyes:      Conjunctiva/sclera: Conjunctivae normal.      Pupils: Pupils are equal, round, and reactive to light.   Cardiovascular:      Rate and Rhythm: Normal rate.      Heart sounds: Normal heart sounds.   Pulmonary:      Effort: Pulmonary effort is normal.      Breath sounds: Normal breath sounds.   Abdominal:      General: Abdomen is flat. Bowel sounds are normal.   Skin:     General: Skin is warm and dry.      Findings: No rash.   Neurological:      General: No focal deficit present.      Mental Status: She is alert and oriented to person, place, and time. Mental status is at baseline.      Motor: No weakness.   Psychiatric:         Mood and Affect: Mood normal.         Behavior: Behavior normal.         Thought Content: Thought content normal.         Judgment: Judgment normal.       RECENT LABS:  Hematology WBC   Date Value Ref Range Status   04/07/2023 6.80 3.40 - 10.80 10*3/mm3 Final   12/14/2021 12.38 (H) 4.5 - 11.0 10*3/uL Final     RBC   Date Value Ref Range Status   04/07/2023 4.31 3.77 " - 5.28 10*6/mm3 Final   12/14/2021 4.24 4.0 - 5.2 10*6/uL Final     Hemoglobin   Date Value Ref Range Status   04/07/2023 12.4 12.0 - 15.9 g/dL Final   12/14/2021 12.2 12.0 - 16.0 g/dL Final     Hematocrit   Date Value Ref Range Status   04/07/2023 38.0 34.0 - 46.6 % Final   12/14/2021 37.3 36.0 - 46.0 % Final     Platelets   Date Value Ref Range Status   04/07/2023 343 140 - 450 10*3/mm3 Final   12/14/2021 495 (H) 140 - 440 10*3/uL Final       CBC:    WBC   Date Value Ref Range Status   04/07/2023 6.80 3.40 - 10.80 10*3/mm3 Final   12/14/2021 12.38 (H) 4.5 - 11.0 10*3/uL Final     RBC   Date Value Ref Range Status   04/07/2023 4.31 3.77 - 5.28 10*6/mm3 Final   12/14/2021 4.24 4.0 - 5.2 10*6/uL Final     Hemoglobin   Date Value Ref Range Status   04/07/2023 12.4 12.0 - 15.9 g/dL Final   12/14/2021 12.2 12.0 - 16.0 g/dL Final     Hematocrit   Date Value Ref Range Status   04/07/2023 38.0 34.0 - 46.6 % Final   12/14/2021 37.3 36.0 - 46.0 % Final     MCV   Date Value Ref Range Status   04/07/2023 88.2 79.0 - 97.0 fL Final   12/14/2021 88.0 80.0 - 100.0 fL Final     MCH   Date Value Ref Range Status   04/07/2023 28.8 26.6 - 33.0 pg Final   12/14/2021 28.8 26.0 - 34.0 pg Final     MCHC   Date Value Ref Range Status   04/07/2023 32.6 31.5 - 35.7 g/dL Final   12/14/2021 32.7 31.0 - 37.0 g/dL Final     RDW   Date Value Ref Range Status   04/07/2023 14.8 12.3 - 15.4 % Final   12/14/2021 13.9 12.0 - 16.8 % Final     RDW-SD   Date Value Ref Range Status   04/07/2023 47.8 37.0 - 54.0 fl Final     MPV   Date Value Ref Range Status   04/07/2023 9.9 6.0 - 12.0 fL Final   12/14/2021 9.5 8.4 - 12.4 fL Final     Platelets   Date Value Ref Range Status   04/07/2023 343 140 - 450 10*3/mm3 Final   12/14/2021 495 (H) 140 - 440 10*3/uL Final     Neutrophil Rel %   Date Value Ref Range Status   12/14/2021 63.8 45 - 80 % Final     Neutrophil %   Date Value Ref Range Status   04/07/2023 54.7 42.7 - 76.0 % Final     Lymphocyte Rel %   Date  Value Ref Range Status   12/14/2021 24.4 15 - 50 % Final     Lymphocyte %   Date Value Ref Range Status   04/07/2023 22.8 19.6 - 45.3 % Final     Monocyte Rel %   Date Value Ref Range Status   12/14/2021 9.0 0 - 15 % Final     Monocyte %   Date Value Ref Range Status   04/07/2023 14.4 (H) 5.0 - 12.0 % Final     Eosinophil %   Date Value Ref Range Status   04/07/2023 7.4 (H) 0.3 - 6.2 % Final   12/14/2021 1.4 0 - 7 % Final     Basophil Rel %   Date Value Ref Range Status   12/14/2021 0.6 0 - 2 % Final     Basophil %   Date Value Ref Range Status   04/07/2023 0.4 0.0 - 1.5 % Final     Immature Grans %   Date Value Ref Range Status   04/07/2023 0.3 0.0 - 0.5 % Final   12/14/2021 0.8 0.0 - 1.0 % Final     Neutrophils Absolute   Date Value Ref Range Status   12/14/2021 7.89 2.0 - 8.8 10*3/uL Final     Neutrophils, Absolute   Date Value Ref Range Status   04/07/2023 3.72 1.70 - 7.00 10*3/mm3 Final     Lymphocytes Absolute   Date Value Ref Range Status   12/14/2021 3.02 0.7 - 5.5 10*3/uL Final     Lymphocytes, Absolute   Date Value Ref Range Status   04/07/2023 1.55 0.70 - 3.10 10*3/mm3 Final     Monocytes Absolute   Date Value Ref Range Status   12/14/2021 1.12 0.0 - 1.7 10*3/uL Final     Monocytes, Absolute   Date Value Ref Range Status   04/07/2023 0.98 (H) 0.10 - 0.90 10*3/mm3 Final     Eosinophils Absolute   Date Value Ref Range Status   12/14/2021 0.17 0.0 - 0.8 10*3/uL Final     Eosinophils, Absolute   Date Value Ref Range Status   04/07/2023 0.50 (H) 0.00 - 0.40 10*3/mm3 Final     Basophils Absolute   Date Value Ref Range Status   12/14/2021 0.08 0.0 - 0.2 10*3/uL Final     Basophils, Absolute   Date Value Ref Range Status   04/07/2023 0.03 0.00 - 0.20 10*3/mm3 Final     Immature Grans, Absolute   Date Value Ref Range Status   04/07/2023 0.02 0.00 - 0.05 10*3/mm3 Final   12/14/2021 0.10 0.00 - 0.10 10*3/uL Final     nRBC   Date Value Ref Range Status   04/07/2023 0.0 0.0 - 0.2 /100 WBC Final        CMP:    Glucose    Date Value Ref Range Status   04/07/2023 99 74 - 124 mg/dL Final     BUN   Date Value Ref Range Status   04/07/2023 10 6 - 20 mg/dL Final   08/07/2020 5 (L) 7 - 20 mg/dL Final     Creatinine   Date Value Ref Range Status   04/07/2023 0.80 0.60 - 1.10 mg/dL Final   08/07/2020 0.6 (L) 0.7 - 1.5 mg/dL Final     Sodium   Date Value Ref Range Status   04/07/2023 140 134 - 145 mmol/L Final   08/07/2020 137 137 - 145 mmol/L Final     Potassium   Date Value Ref Range Status   04/07/2023 4.3 3.5 - 4.7 mmol/L Final   08/07/2020 3.5 3.5 - 5.1 mmol/L Final     Chloride   Date Value Ref Range Status   04/07/2023 105 98 - 107 mmol/L Final   08/07/2020 106 98 - 107 mmol/L Final     CO2   Date Value Ref Range Status   04/07/2023 24.0 22.0 - 29.0 mmol/L Final     Total CO2   Date Value Ref Range Status   08/07/2020 21 (L) 22 - 30 mmol/L Final     Calcium   Date Value Ref Range Status   04/07/2023 9.7 8.5 - 10.2 mg/dL Final   08/07/2020 8.4 8.4 - 10.2 mg/dL Final     Total Protein   Date Value Ref Range Status   04/07/2023 7.2 6.3 - 8.0 g/dL Final   08/05/2020 7.5 6.3 - 8.2 g/dL Final     Albumin   Date Value Ref Range Status   04/07/2023 4.2 3.5 - 5.2 g/dL Final   08/05/2020 4.1 3.5 - 5.0 g/dL Final     ALT (SGPT)   Date Value Ref Range Status   04/07/2023 19 0 - 33 U/L Final   08/05/2020 25 0 - 35 U/L Final     Comment:     If ALT testing ordered prior to 2359 on 11/16/19, please use reference range: Male 0-50, Female 0-35.  [employer] switched to a new ALT assay on 11/16/19 which yields results 10 U/L lower than the old assay.     AST (SGOT)   Date Value Ref Range Status   04/07/2023 19 0 - 32 U/L Final   08/05/2020 39 15 - 46 U/L Final     Alkaline Phosphatase   Date Value Ref Range Status   04/07/2023 61 38 - 116 U/L Final   08/05/2020 65 38 - 126 U/L Final     Total Bilirubin   Date Value Ref Range Status   04/07/2023 0.2 0.2 - 1.2 mg/dL Final   08/05/2020 0.3 0.2 - 1.3 mg/dL Final     Globulin   Date Value  Ref Range Status   04/07/2023 3.0 1.8 - 3.5 gm/dL Final   08/05/2020 3.4 1.5 - 4.5 g/dL Final     A/G Ratio   Date Value Ref Range Status   04/07/2023 1.4 1.1 - 2.4 g/dL Final     BUN/Creatinine Ratio   Date Value Ref Range Status   04/07/2023 12.5 7.3 - 30.0 Final   08/07/2020 8.6 RATIO Final     Anion Gap   Date Value Ref Range Status   04/07/2023 11.0 5.0 - 15.0 mmol/L Final           Assessment & Plan     Diagnoses and all orders for this visit:    1. Multiple myeloma not having achieved remission (Primary)  -     Cancel: bortezomib (VELCADE) chemo injection 2.5 mg    2. Thrombocytosis    Other orders  -     Cancel: CBC and Differential; Future  -     Cancel: Hydrocortisone Sod Suc (PF) (Solu-CORTEF) injection 100 mg  -     Cancel: diphenhydrAMINE (BENADRYL) injection 50 mg  -     Cancel: famotidine (PEPCID) injection 20 mg  -     Cancel: meperidine (DEMEROL) injection 25 mg       1.  Multiple Myeloma:    · Referred 11/14/2022 for leukocytosis and thrombocytosis by rheumatology, Dr. Ayala.  Diagnosed with Sjogren's by Dr. yAala.  · History of anemia when she had a cerebral aneurysm clip done in 2019.  · Patient found to have serum protein electrophoresis that disclosed a monoclonal protein   · IgG monoclonal protein in the 2000 category, Bence-Moon protein in the urine, very small amount that is not quantifiable.  · Recommend patient proceed with bone marrow testing.  · 1/24/2023 bone marrow biopsy  · Bone marrow biopsy showing 18% plasma cells infiltrating in the bone marrow. Also absence of iron stores. The patient had no evidence of myelofibrosis, lymphoma, leukemia, or myelodysplasia.   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 [place] red stain was negative for amyloid deposition  · Recommended treatment with combination of Darzalex once a week, Velcade subcutaneously once a week, 3 weeks out of 4, Revlimid 15 mg a day for 3 weeks out of a month and  dexamethasone 20 mg once a week.  · Plan to treat the patient for the next 4 months following her monoclonal protein and eventually referred to [facility] for consideration of high-dose chemotherapy and stem cell transplant.  · No evidence of lytic lesions, therefore will not use Zometa or Xgeva at this time.  · 2/3/2023 cycle 1 Darzalex Faspro and Velcade.  · 02/10/2023 the patient was further reviewed the day that she comes for her 2nd Darzalex infusion and 2nd Velcade injection. So far the patient has not encountered any side effects of the medicines.  · Seen 2/24/2023 due for cycle 1 day 22.  Patient started her Revlimid 15 mg daily for 3 out of 4 weeks last Wednesday.  So far she is tolerating all of her treatment fairly well without any significant side effects other than ongoing fatigue.  · 3/10/2023 here for cycle 2, day 8 Velcade, Darzalex fast pro, continuing on Revlimid 15 mg daily for 3 weeks on, 1 week off as well as dexamethasone 20 mg weekly.  We will recheck paraprotein studies today.  On 03/17/2023,  She has had resolution of pain in her lower extremities.  Her monoclonal protein has dropped by half in only 1 month of therapy.  She questions referral to BMT, which will be made to Dr. Plasencia in May or June.  3/31/2023: C3D1 Darzalex and Velcade.  Continues Revlimid at home, currently on her second week on Revlimid.  Continues dexamethasone weekly.  Tolerating well.  4/7/2023 seen today for cycle 3, day 8.  She will receive Velcade only today (Darzalex which is every other week).  She will continue her Revlimid 15 mg daily for 21 out of 28 days, as well as dexamethasone 20 mg weekly.  Repeat paraprotein studies pending for today.    2.  Nodular goiter: she has a normal TSH and a normal T4. I will let Beau Frye MD, the patient's Primary Physician address this issue eventually with an ultrasound and clinical examination. The patient has antithyroid antibody positivity and very likely in my  opinion she probably has a component of Hashimoto's thyroiditis.   On 03/17/2023 her goiter is no different today than what it was during the original consultation. This will be watched in absence of any other intervention.    3.  Prophylaxis: recommend she start low-dose Xarelto 2.5 mg p.o. daily after she has had her molar extracted for initiation and before initiation of her Revlimid administration.   Continue taking low-dose Xarelto 2.5 mg a day for prevention of thrombosis associated with Revlimid.  Tolerating well without signs or symptoms of bleeding or DVT.      4.  Iron deficiency:  · given her absence of iron stores in her bone marrow she will require IV iron therapy. This could be accommodated at some point between all the infusions and treatments that she needs to have.  3/30/2023: Hemoglobin today 12.3.  4/7/2023 Hgb 12.4    5.  Sjogren's syndrome with myopathy:  · Followed by rheumatology  · On 03/17/2023 the patient has minimal symptomatology pertinent to this at this time besides minimal dryness in her eyes and oral mucosas.    6.  Skin rash-called 3/23/2023 to report skin rash to the bilateral cheeks, neck, spreading towards her shoulders.  She was advised to hold Bactrim.  She was also sent clindamycin gel which she started on Tuesday.  Skin rash is now only present to the bilateral cheeks and much improved.  She will continue clindamycin gel until resolution.  She will continue to hold Bactrim.  · Skin rash improved continuing on clindamycin gel.    PLAN:   · Proceed with C3 D8 Velcade today.  · Continue Revlimid 15 mg daily for days 1 through 21 of a 28-day cycle.  · Continue dexamethasone 20 mg weekly.  · Continue clindamycin gel for skin rash.  · Continue to hold Bactrim.  · 1 week patient will return for follow-up due for C3 D15 Darzalex Faspro and Velcade.  · Paraprotein studies pending for today.  · Call/ return sooner should he develop any new concerns or problems.    Patient is on a high  risk medication requiring close monitoring for toxicity.    Mayra Muñoz, APRN  04/07/2023

## 2023-04-10 LAB
ALBUMIN SERPL ELPH-MCNC: 3.4 G/DL (ref 2.9–4.4)
ALBUMIN/GLOB SERPL: 1.1 {RATIO} (ref 0.7–1.7)
ALPHA1 GLOB SERPL ELPH-MCNC: 0.2 G/DL (ref 0–0.4)
ALPHA2 GLOB SERPL ELPH-MCNC: 1 G/DL (ref 0.4–1)
B-GLOBULIN SERPL ELPH-MCNC: 1 G/DL (ref 0.7–1.3)
GAMMA GLOB SERPL ELPH-MCNC: 1.1 G/DL (ref 0.4–1.8)
GLOBULIN SER-MCNC: 3.3 G/DL (ref 2.2–3.9)
IGA SERPL-MCNC: 72 MG/DL (ref 87–352)
IGG SERPL-MCNC: 1111 MG/DL (ref 586–1602)
IGM SERPL-MCNC: 43 MG/DL (ref 26–217)
INTERPRETATION SERPL IEP-IMP: ABNORMAL
KAPPA LC FREE SER-MCNC: 22.2 MG/L (ref 3.3–19.4)
KAPPA LC FREE/LAMBDA FREE SER: 1.8 {RATIO} (ref 0.26–1.65)
LABORATORY COMMENT REPORT: ABNORMAL
LAMBDA LC FREE SERPL-MCNC: 12.3 MG/L (ref 5.7–26.3)
M PROTEIN SERPL ELPH-MCNC: 0.5 G/DL
PROT SERPL-MCNC: 6.7 G/DL (ref 6–8.5)

## 2023-04-11 ENCOUNTER — TELEPHONE (OUTPATIENT)
Dept: ONCOLOGY | Facility: CLINIC | Age: 53
End: 2023-04-11
Payer: COMMERCIAL

## 2023-04-11 NOTE — TELEPHONE ENCOUNTER
Called patient to obtain more information on condition and restrictions needed as well as relay information regarding dry eyes. Pt has taken Dr. Hawthorne's information. Will write letter outlining restrictions from Dr. Freedman. Pt v/u and requested I mail and send via 5by. Jonelle Hernandez RN

## 2023-04-11 NOTE — TELEPHONE ENCOUNTER
----- Message from Estuardo Freedman MD sent at 4/10/2023  8:17 AM EDT -----  Emelia Morin, Estuardo Howell MD Hey...   Yudy is having very dry eyes, blurry vision. Acknowledges sjorgrens, but not responding to eye drops. Wanted to you know in case important.   Also, continues to work full time; we discussed potential 'light duty' with further restrictions added to current no overtime. I detailed this in my note. Curious if this seems reasonable and acceptable, and if you could consider adjusting?   Thank you!   Adan Mims can we call pt to see if she wants to be on light duty? She must see eye doctor for her dry eyes dr april lopez [facility]

## 2023-04-14 ENCOUNTER — LAB (OUTPATIENT)
Dept: LAB | Facility: HOSPITAL | Age: 53
End: 2023-04-14
Payer: COMMERCIAL

## 2023-04-14 ENCOUNTER — APPOINTMENT (OUTPATIENT)
Dept: ONCOLOGY | Facility: HOSPITAL | Age: 53
End: 2023-04-14
Payer: COMMERCIAL

## 2023-04-14 ENCOUNTER — OFFICE VISIT (OUTPATIENT)
Dept: ONCOLOGY | Facility: CLINIC | Age: 53
End: 2023-04-14
Payer: COMMERCIAL

## 2023-04-14 ENCOUNTER — INFUSION (OUTPATIENT)
Dept: ONCOLOGY | Facility: HOSPITAL | Age: 53
End: 2023-04-14
Payer: COMMERCIAL

## 2023-04-14 VITALS
RESPIRATION RATE: 16 BRPM | DIASTOLIC BLOOD PRESSURE: 87 MMHG | TEMPERATURE: 98 F | OXYGEN SATURATION: 98 % | HEART RATE: 69 BPM | SYSTOLIC BLOOD PRESSURE: 141 MMHG | HEIGHT: 65 IN | WEIGHT: 196.3 LBS | BODY MASS INDEX: 32.71 KG/M2

## 2023-04-14 DIAGNOSIS — C90.00 MULTIPLE MYELOMA NOT HAVING ACHIEVED REMISSION: ICD-10-CM

## 2023-04-14 DIAGNOSIS — C90.00 MULTIPLE MYELOMA NOT HAVING ACHIEVED REMISSION: Primary | ICD-10-CM

## 2023-04-14 LAB
BASOPHILS # BLD AUTO: 0.03 10*3/MM3 (ref 0–0.2)
BASOPHILS NFR BLD AUTO: 0.3 % (ref 0–1.5)
DEPRECATED RDW RBC AUTO: 48 FL (ref 37–54)
EOSINOPHIL # BLD AUTO: 0.19 10*3/MM3 (ref 0–0.4)
EOSINOPHIL NFR BLD AUTO: 1.7 % (ref 0.3–6.2)
ERYTHROCYTE [DISTWIDTH] IN BLOOD BY AUTOMATED COUNT: 14.9 % (ref 12.3–15.4)
HCT VFR BLD AUTO: 37.8 % (ref 34–46.6)
HGB BLD-MCNC: 12.4 G/DL (ref 12–15.9)
IMM GRANULOCYTES # BLD AUTO: 0.06 10*3/MM3 (ref 0–0.05)
IMM GRANULOCYTES NFR BLD AUTO: 0.5 % (ref 0–0.5)
LYMPHOCYTES # BLD AUTO: 3.71 10*3/MM3 (ref 0.7–3.1)
LYMPHOCYTES NFR BLD AUTO: 32.8 % (ref 19.6–45.3)
MCH RBC QN AUTO: 29.2 PG (ref 26.6–33)
MCHC RBC AUTO-ENTMCNC: 32.8 G/DL (ref 31.5–35.7)
MCV RBC AUTO: 88.9 FL (ref 79–97)
MONOCYTES # BLD AUTO: 1.44 10*3/MM3 (ref 0.1–0.9)
MONOCYTES NFR BLD AUTO: 12.7 % (ref 5–12)
NEUTROPHILS NFR BLD AUTO: 5.89 10*3/MM3 (ref 1.7–7)
NEUTROPHILS NFR BLD AUTO: 52 % (ref 42.7–76)
NRBC BLD AUTO-RTO: 0 /100 WBC (ref 0–0.2)
PLATELET # BLD AUTO: 498 10*3/MM3 (ref 140–450)
PMV BLD AUTO: 9.3 FL (ref 6–12)
RBC # BLD AUTO: 4.25 10*6/MM3 (ref 3.77–5.28)
WBC NRBC COR # BLD: 11.32 10*3/MM3 (ref 3.4–10.8)

## 2023-04-14 PROCEDURE — 25010000002 DARATUMUMAB-HYALURONIDASE-FIHJ 1800-30000 MG-UT/15ML SOLUTION: Performed by: NURSE PRACTITIONER

## 2023-04-14 PROCEDURE — 85025 COMPLETE CBC W/AUTO DIFF WBC: CPT

## 2023-04-14 PROCEDURE — 96401 CHEMO ANTI-NEOPL SQ/IM: CPT

## 2023-04-14 PROCEDURE — 25010000002 BORTEZOMIB PER 0.1 MG: Performed by: NURSE PRACTITIONER

## 2023-04-14 PROCEDURE — 36415 COLL VENOUS BLD VENIPUNCTURE: CPT

## 2023-04-14 PROCEDURE — 63710000001 DIPHENHYDRAMINE PER 50 MG: Performed by: NURSE PRACTITIONER

## 2023-04-14 RX ORDER — ACETAMINOPHEN 500 MG
1000 TABLET ORAL ONCE
Status: CANCELLED | OUTPATIENT
Start: 2023-04-14

## 2023-04-14 RX ORDER — DIPHENHYDRAMINE HCL 25 MG
25 CAPSULE ORAL ONCE
Status: COMPLETED | OUTPATIENT
Start: 2023-04-14 | End: 2023-04-14

## 2023-04-14 RX ORDER — BORTEZOMIB 3.5 MG/1
1.3 INJECTION, POWDER, LYOPHILIZED, FOR SOLUTION INTRAVENOUS; SUBCUTANEOUS ONCE
Status: COMPLETED | OUTPATIENT
Start: 2023-04-14 | End: 2023-04-14

## 2023-04-14 RX ORDER — BORTEZOMIB 3.5 MG/1
1.3 INJECTION, POWDER, LYOPHILIZED, FOR SOLUTION INTRAVENOUS; SUBCUTANEOUS ONCE
Status: CANCELLED | OUTPATIENT
Start: 2023-04-14

## 2023-04-14 RX ORDER — FAMOTIDINE 10 MG/ML
20 INJECTION, SOLUTION INTRAVENOUS AS NEEDED
Status: CANCELLED | OUTPATIENT
Start: 2023-04-14

## 2023-04-14 RX ORDER — ACETAMINOPHEN 500 MG
1000 TABLET ORAL ONCE
Status: COMPLETED | OUTPATIENT
Start: 2023-04-14 | End: 2023-04-14

## 2023-04-14 RX ORDER — FAMOTIDINE 20 MG/1
20 TABLET, FILM COATED ORAL ONCE
Status: CANCELLED
Start: 2023-04-14 | End: 2023-04-14

## 2023-04-14 RX ORDER — DIPHENHYDRAMINE HYDROCHLORIDE 50 MG/ML
50 INJECTION INTRAMUSCULAR; INTRAVENOUS AS NEEDED
Status: CANCELLED | OUTPATIENT
Start: 2023-04-14

## 2023-04-14 RX ORDER — MEPERIDINE HYDROCHLORIDE 25 MG/ML
25 INJECTION INTRAMUSCULAR; INTRAVENOUS; SUBCUTANEOUS
Status: CANCELLED | OUTPATIENT
Start: 2023-04-14

## 2023-04-14 RX ORDER — DIPHENHYDRAMINE HCL 25 MG
25 CAPSULE ORAL ONCE
Status: CANCELLED | OUTPATIENT
Start: 2023-04-14

## 2023-04-14 RX ORDER — FAMOTIDINE 20 MG/1
20 TABLET, FILM COATED ORAL ONCE
Status: COMPLETED | OUTPATIENT
Start: 2023-04-14 | End: 2023-04-14

## 2023-04-14 RX ADMIN — BORTEZOMIB 2.5 MG: 3.5 INJECTION, POWDER, LYOPHILIZED, FOR SOLUTION INTRAVENOUS; SUBCUTANEOUS at 15:37

## 2023-04-14 RX ADMIN — DARATUMUMAB AND HYALURONIDASE-FIHJ (HUMAN RECOMBINANT) 1800 MG: 1800; 30000 INJECTION SUBCUTANEOUS at 15:37

## 2023-04-14 RX ADMIN — ACETAMINOPHEN 1000 MG: 500 TABLET, FILM COATED ORAL at 14:58

## 2023-04-14 RX ADMIN — FAMOTIDINE 20 MG: 20 TABLET, FILM COATED ORAL at 15:00

## 2023-04-14 RX ADMIN — DIPHENHYDRAMINE HYDROCHLORIDE 25 MG: 25 CAPSULE ORAL at 14:58

## 2023-04-14 NOTE — PROGRESS NOTES
Subjective        REASONS FOR FOLLOWUP:    1. 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 NO ANEMIA, NORMAL CREATININE,NORMAL CALCIUM, NO BONE PAIN NO ADENOPATHIES , NORMAL DIFFERENTIAL IN WBC.  2. LEUKOCYTOSIS AND THROMBOCYTOSIS.  3. GOITER VERY LIKELY HASHIMOTO'S THYROIDITIS.  4. SJOGREN'S SYNDROME.      HISTORY OF PRESENT ILLNESS:  Patient is a 52-year-old female with the above-mentioned history who is here today for lab review and evaluation prior to cycle 3-day 15 Velcade and Darzalex fas-pro.  She continues to complain of pain especially in her legs.  She has now been placed on light duty at work.  She denies any issues with swelling or worsening neuropathy symptoms.  She states that her legs just ache quite a bit, similar to when she was initially diagnosed.  She does not really take anything for her pain.    She has had a little bit of constipation and had a small amount of blood on toilet paper when she wipes, and thinks that she may have developed a hemorrhoid.    She also continues on Revlimid 15 mg daily for 21 out of 28 days.      Past Medical History:   Diagnosis Date   • Achilles tendonitis    • Anemia    • Anxiety    • Arthritis     ankles, shoulders   • Asthma     childhood   • Benign essential hypertension    • Cerebral aneurysm    • GERD (gastroesophageal reflux disease)    • Headache    • History of anemia    • History of E. coli septicemia    • Multiple myeloma 2023   • Rotator cuff syndrome    • Seasonal allergies         Past Surgical History:   Procedure Laterality Date   • ABSCESS DRAINAGE  2000    Renal abscess   • CEREBRAL ANGIOGRAM  2020   • CRANIOTOMY  2020    with angiogram for aneurysm   • HYSTERECTOMY  2017    partial, both ovaries remain   • ROTATOR CUFF REPAIR Right 2012   • SHOULDER SURGERY          Current Outpatient Medications on File Prior to Visit   Medication Sig Dispense Refill   • acyclovir (ZOVIRAX) 400 MG tablet Take 1 tablet by mouth 2 (Two)  Times a Day. 60 tablet 3   • albuterol sulfate  (90 Base) MCG/ACT inhaler Ventolin HFA 90 mcg/actuation aerosol inhaler     • amLODIPine (NORVASC) 5 MG tablet      • clindamycin (Clindagel) 1 % gel Apply 1 application topically to the appropriate area as directed 2 (Two) Times a Day. 30 g 0   • dexamethasone (DECADRON) 4 MG tablet Take 5 tablets by mouth 1 (One) Time Per Week. Take in the morning with food. 20 tablet 3   • Emgality 120 MG/ML auto-injector pen INJECT 1 ML SUBCUTANEOUSLY ONCE EVERY MONTH     • lenalidomide (Revlimid) 15 MG capsule TAKE 1 CAPSULE BY MOUTH ONCE DAILY FOR 21 DAYS ON AND 7 DAYS OFF 21 capsule 0   • levothyroxine (SYNTHROID, LEVOTHROID) 25 MCG tablet      • metoprolol tartrate (LOPRESSOR) 50 MG tablet Take 1 tablet by mouth Every Morning.     • OLANZapine (ZyPREXA) 2.5 MG tablet Take 1 tablet by mouth Every Night. (Patient taking differently: Take 1 tablet by mouth Every Night. Taking PRN) 30 tablet 3   • ondansetron (ZOFRAN) 8 MG tablet Take 1 tablet by mouth 3 (Three) Times a Day As Needed for Nausea or Vomiting. (Patient taking differently: Take 1 tablet by mouth 3 (Three) Times a Day As Needed for Nausea or Vomiting. Taking PRN) 30 tablet 5   • Rimegepant Sulfate (NURTEC) 75 MG tablet dispersible tablet Take 1 tablet by mouth Daily As Needed.     • Rivaroxaban (Xarelto) 2.5 MG tablet Take 1 tablet by mouth Daily. 30 tablet 2   • sulfamethoxazole-trimethoprim (BACTRIM DS,SEPTRA DS) 800-160 MG per tablet Take 1 tablet by mouth 3 (Three) Times a Week. Take 1 tablet on Mondays, Wednesdays and Fridays. 12 tablet 3     No current facility-administered medications on file prior to visit.        ALLERGIES:    Allergies   Allergen Reactions   • Coconut Shortness Of Breath   • Eggs Or Egg-Derived Products Diarrhea        Social History     Socioeconomic History   • Marital status:    • Number of children: 3   Tobacco Use   • Smoking status: Never   • Smokeless tobacco: Never  "  Substance and Sexual Activity   • Alcohol use: Yes   • Drug use: Never        Family History   Problem Relation Age of Onset   • Hypertension Father    • Multiple sclerosis Brother    • Diabetes Brother    • Colon cancer Paternal Aunt    • Heart attack Maternal Grandmother    • Heart disease Maternal Grandmother    • Pancreatic cancer Maternal Grandfather    • Colon cancer Paternal Grandmother    • Heart attack Paternal Grandfather    • Cancer Paternal Grandfather       Objective     Vitals:    04/14/23 1423   BP: 141/87   Pulse: 69   Resp: 16   Temp: 98 °F (36.7 °C)   TempSrc: Temporal   SpO2: 98%   Weight: 89 kg (196 lb 4.8 oz)   Height: 165.1 cm (65\")   PainSc:   8   PainLoc: Leg         4/14/2023     2:14 PM   Current Status   ECOG score 0     Physical Exam  Vitals reviewed.   Constitutional:       General: She is not in acute distress.     Appearance: Normal appearance. She is well-developed.   HENT:      Head: Normocephalic and atraumatic.   Eyes:      Pupils: Pupils are equal, round, and reactive to light.   Cardiovascular:      Rate and Rhythm: Normal rate and regular rhythm.      Heart sounds: Normal heart sounds. No murmur heard.  Pulmonary:      Effort: Pulmonary effort is normal. No respiratory distress.      Breath sounds: Normal breath sounds. No wheezing, rhonchi or rales.   Abdominal:      General: Bowel sounds are normal. There is no distension.      Palpations: Abdomen is soft.   Musculoskeletal:         General: No swelling. Normal range of motion.      Cervical back: Normal range of motion.   Skin:     General: Skin is warm and dry.      Findings: No rash.   Neurological:      Mental Status: She is alert and oriented to person, place, and time.           RECENT LABS:  Hematology WBC   Date Value Ref Range Status   04/14/2023 11.32 (H) 3.40 - 10.80 10*3/mm3 Final   12/14/2021 12.38 (H) 4.5 - 11.0 10*3/uL Final     RBC   Date Value Ref Range Status   04/14/2023 4.25 3.77 - 5.28 10*6/mm3 Final "   12/14/2021 4.24 4.0 - 5.2 10*6/uL Final     Hemoglobin   Date Value Ref Range Status   04/14/2023 12.4 12.0 - 15.9 g/dL Final   12/14/2021 12.2 12.0 - 16.0 g/dL Final     Hematocrit   Date Value Ref Range Status   04/14/2023 37.8 34.0 - 46.6 % Final   12/14/2021 37.3 36.0 - 46.0 % Final     Platelets   Date Value Ref Range Status   04/14/2023 498 (H) 140 - 450 10*3/mm3 Final   12/14/2021 495 (H) 140 - 440 10*3/uL Final       CBC:    WBC   Date Value Ref Range Status   04/14/2023 11.32 (H) 3.40 - 10.80 10*3/mm3 Final   12/14/2021 12.38 (H) 4.5 - 11.0 10*3/uL Final     RBC   Date Value Ref Range Status   04/14/2023 4.25 3.77 - 5.28 10*6/mm3 Final   12/14/2021 4.24 4.0 - 5.2 10*6/uL Final     Hemoglobin   Date Value Ref Range Status   04/14/2023 12.4 12.0 - 15.9 g/dL Final   12/14/2021 12.2 12.0 - 16.0 g/dL Final     Hematocrit   Date Value Ref Range Status   04/14/2023 37.8 34.0 - 46.6 % Final   12/14/2021 37.3 36.0 - 46.0 % Final     MCV   Date Value Ref Range Status   04/14/2023 88.9 79.0 - 97.0 fL Final   12/14/2021 88.0 80.0 - 100.0 fL Final     MCH   Date Value Ref Range Status   04/14/2023 29.2 26.6 - 33.0 pg Final   12/14/2021 28.8 26.0 - 34.0 pg Final     MCHC   Date Value Ref Range Status   04/14/2023 32.8 31.5 - 35.7 g/dL Final   12/14/2021 32.7 31.0 - 37.0 g/dL Final     RDW   Date Value Ref Range Status   04/14/2023 14.9 12.3 - 15.4 % Final   12/14/2021 13.9 12.0 - 16.8 % Final     RDW-SD   Date Value Ref Range Status   04/14/2023 48.0 37.0 - 54.0 fl Final     MPV   Date Value Ref Range Status   04/14/2023 9.3 6.0 - 12.0 fL Final   12/14/2021 9.5 8.4 - 12.4 fL Final     Platelets   Date Value Ref Range Status   04/14/2023 498 (H) 140 - 450 10*3/mm3 Final   12/14/2021 495 (H) 140 - 440 10*3/uL Final     Neutrophil Rel %   Date Value Ref Range Status   12/14/2021 63.8 45 - 80 % Final     Neutrophil %   Date Value Ref Range Status   04/14/2023 52.0 42.7 - 76.0 % Final     Lymphocyte Rel %   Date Value Ref  Range Status   12/14/2021 24.4 15 - 50 % Final     Lymphocyte %   Date Value Ref Range Status   04/14/2023 32.8 19.6 - 45.3 % Final     Monocyte Rel %   Date Value Ref Range Status   12/14/2021 9.0 0 - 15 % Final     Monocyte %   Date Value Ref Range Status   04/14/2023 12.7 (H) 5.0 - 12.0 % Final     Eosinophil %   Date Value Ref Range Status   04/14/2023 1.7 0.3 - 6.2 % Final   12/14/2021 1.4 0 - 7 % Final     Basophil Rel %   Date Value Ref Range Status   12/14/2021 0.6 0 - 2 % Final     Basophil %   Date Value Ref Range Status   04/14/2023 0.3 0.0 - 1.5 % Final     Immature Grans %   Date Value Ref Range Status   04/14/2023 0.5 0.0 - 0.5 % Final   12/14/2021 0.8 0.0 - 1.0 % Final     Neutrophils Absolute   Date Value Ref Range Status   12/14/2021 7.89 2.0 - 8.8 10*3/uL Final     Neutrophils, Absolute   Date Value Ref Range Status   04/14/2023 5.89 1.70 - 7.00 10*3/mm3 Final     Lymphocytes Absolute   Date Value Ref Range Status   12/14/2021 3.02 0.7 - 5.5 10*3/uL Final     Lymphocytes, Absolute   Date Value Ref Range Status   04/14/2023 3.71 (H) 0.70 - 3.10 10*3/mm3 Final     Monocytes Absolute   Date Value Ref Range Status   12/14/2021 1.12 0.0 - 1.7 10*3/uL Final     Monocytes, Absolute   Date Value Ref Range Status   04/14/2023 1.44 (H) 0.10 - 0.90 10*3/mm3 Final     Eosinophils Absolute   Date Value Ref Range Status   12/14/2021 0.17 0.0 - 0.8 10*3/uL Final     Eosinophils, Absolute   Date Value Ref Range Status   04/14/2023 0.19 0.00 - 0.40 10*3/mm3 Final     Basophils Absolute   Date Value Ref Range Status   12/14/2021 0.08 0.0 - 0.2 10*3/uL Final     Basophils, Absolute   Date Value Ref Range Status   04/14/2023 0.03 0.00 - 0.20 10*3/mm3 Final     Immature Grans, Absolute   Date Value Ref Range Status   04/14/2023 0.06 (H) 0.00 - 0.05 10*3/mm3 Final   12/14/2021 0.10 0.00 - 0.10 10*3/uL Final     nRBC   Date Value Ref Range Status   04/14/2023 0.0 0.0 - 0.2 /100 WBC Final        CMP:    Glucose   Date  Value Ref Range Status   04/07/2023 99 74 - 124 mg/dL Final     BUN   Date Value Ref Range Status   04/07/2023 10 6 - 20 mg/dL Final   08/07/2020 5 (L) 7 - 20 mg/dL Final     Creatinine   Date Value Ref Range Status   04/07/2023 0.80 0.60 - 1.10 mg/dL Final   08/07/2020 0.6 (L) 0.7 - 1.5 mg/dL Final     Sodium   Date Value Ref Range Status   04/07/2023 140 134 - 145 mmol/L Final   08/07/2020 137 137 - 145 mmol/L Final     Potassium   Date Value Ref Range Status   04/07/2023 4.3 3.5 - 4.7 mmol/L Final   08/07/2020 3.5 3.5 - 5.1 mmol/L Final     Chloride   Date Value Ref Range Status   04/07/2023 105 98 - 107 mmol/L Final   08/07/2020 106 98 - 107 mmol/L Final     CO2   Date Value Ref Range Status   04/07/2023 24.0 22.0 - 29.0 mmol/L Final     Total CO2   Date Value Ref Range Status   08/07/2020 21 (L) 22 - 30 mmol/L Final     Calcium   Date Value Ref Range Status   04/07/2023 9.7 8.5 - 10.2 mg/dL Final   08/07/2020 8.4 8.4 - 10.2 mg/dL Final     Total Protein   Date Value Ref Range Status   04/07/2023 7.2 6.3 - 8.0 g/dL Final   08/05/2020 7.5 6.3 - 8.2 g/dL Final     Albumin   Date Value Ref Range Status   04/07/2023 4.2 3.5 - 5.2 g/dL Final   04/07/2023 3.4 2.9 - 4.4 g/dL Final   08/05/2020 4.1 3.5 - 5.0 g/dL Final     ALT (SGPT)   Date Value Ref Range Status   04/07/2023 19 0 - 33 U/L Final   08/05/2020 25 0 - 35 U/L Final     Comment:     If ALT testing ordered prior to 2359 on 11/16/19, please use reference range: Male 0-50, Female 0-35.  [facility] [facility] switched to a new ALT assay on 11/16/19 which yields results 10 U/L lower than the old assay.     AST (SGOT)   Date Value Ref Range Status   04/07/2023 19 0 - 32 U/L Final   08/05/2020 39 15 - 46 U/L Final     Alkaline Phosphatase   Date Value Ref Range Status   04/07/2023 61 38 - 116 U/L Final   08/05/2020 65 38 - 126 U/L Final     Total Bilirubin   Date Value Ref Range Status   04/07/2023 0.2 0.2 - 1.2 mg/dL Final   08/05/2020 0.3 0.2 - 1.3 mg/dL  Final     Globulin   Date Value Ref Range Status   04/07/2023 3.0 1.8 - 3.5 gm/dL Final   08/05/2020 3.4 1.5 - 4.5 g/dL Final     A/G Ratio   Date Value Ref Range Status   04/07/2023 1.4 1.1 - 2.4 g/dL Final     BUN/Creatinine Ratio   Date Value Ref Range Status   04/07/2023 12.5 7.3 - 30.0 Final   08/07/2020 8.6 RATIO Final     Anion Gap   Date Value Ref Range Status   04/07/2023 11.0 5.0 - 15.0 mmol/L Final           Assessment & Plan     Diagnoses and all orders for this visit:    1. Multiple myeloma not having achieved remission (Primary)  -     Cancel: famotidine (PEPCID) tablet 20 mg  -     Cancel: acetaminophen (TYLENOL) tablet 1,000 mg  -     Cancel: diphenhydrAMINE (BENADRYL) capsule 25 mg  -     Cancel: bortezomib (VELCADE) chemo injection 2.5 mg  -     Cancel: daratumumab-hyaluronidase-fihj (DARZALEX FASPRO) [id number] MG-UT/15ML injection 1,800 mg    Other orders  -     Cancel: Hydrocortisone Sod Suc (PF) (Solu-CORTEF) injection 100 mg  -     Cancel: diphenhydrAMINE (BENADRYL) injection 50 mg  -     Cancel: famotidine (PEPCID) injection 20 mg  -     Cancel: meperidine (DEMEROL) injection 25 mg       1.  Multiple Myeloma:    · Referred 11/14/2022 for leukocytosis and thrombocytosis by rheumatology, Dr. Ayala.  Diagnosed with Sjogren's by Dr. Ayala.  · History of anemia when she had a cerebral aneurysm clip done in 2019.  · Patient found to have serum protein electrophoresis that disclosed a monoclonal protein   · IgG monoclonal protein in the 2000 category, Bence-Moon protein in the urine, very small amount that is not quantifiable.  · Recommend patient proceed with bone marrow testing.  · 1/24/2023 bone marrow biopsy  · Bone marrow biopsy showing 18% plasma cells infiltrating in the bone marrow. Also absence of iron stores. The patient had no evidence of myelofibrosis, lymphoma, leukemia, or myelodysplasia.   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  [place] red stain was negative for amyloid deposition  · Recommended treatment with combination of Darzalex once a week, Velcade subcutaneously once a week, 3 weeks out of 4, Revlimid 15 mg a day for 3 weeks out of a month and dexamethasone 20 mg once a week.  · Plan to treat the patient for the next 4 months following her monoclonal protein and eventually referred to [facility] for consideration of high-dose chemotherapy and stem cell transplant.  · No evidence of lytic lesions, therefore will not use Zometa or Xgeva at this time.  · 2/3/2023 cycle 1 Darzalex Faspro and Velcade.  · 02/10/2023 the patient was further reviewed the day that she comes for her 2nd Darzalex infusion and 2nd Velcade injection. So far the patient has not encountered any side effects of the medicines.  · Seen 2/24/2023 due for cycle 1 day 22.  Patient started her Revlimid 15 mg daily for 3 out of 4 weeks last Wednesday.  So far she is tolerating all of her treatment fairly well without any significant side effects other than ongoing fatigue.  · 3/10/2023 here for cycle 2, day 8 Velcade, Darzalex fast pro, continuing on Revlimid 15 mg daily for 3 weeks on, 1 week off as well as dexamethasone 20 mg weekly.  We will recheck paraprotein studies today.  On 03/17/2023,  She has had resolution of pain in her lower extremities.  Her monoclonal protein has dropped by half in only 1 month of therapy.  She questions referral to BMT, which will be made to Dr. Plasencia in May or June.  3/31/2023: C3D1 Darzalex and Velcade.  Continues Revlimid at home, currently on her second week on Revlimid.  Continues dexamethasone weekly.  Tolerating well.  4/7/2023 seen today for cycle 3, day 8.  She will receive Velcade only today (Darzalex which is every other week).  She will continue her Revlimid 15 mg daily for 21 out of 28 days, as well as dexamethasone 20 mg weekly.  Repeat paraprotein studies pending for today.  4/14/2023 due for cycle 3, day 15  Velcade continuing to complain of leg pain.  We reviewed M spike improved to 0.5,Free light chain kappa 22.2, kappa/lambda ratio 1.8.     2.  Nodular goiter: she has a normal TSH and a normal T4. I will let Beau Frye MD, the patient's Primary Physician address this issue eventually with an ultrasound and clinical examination. The patient has antithyroid antibody positivity and very likely in my opinion she probably has a component of Hashimoto's thyroiditis.   On 03/17/2023 her goiter is no different today than what it was during the original consultation. This will be watched in absence of any other intervention.    3.  Prophylaxis: recommend she start low-dose Xarelto 2.5 mg p.o. daily after she has had her molar extracted for initiation and before initiation of her Revlimid administration.   Continue taking low-dose Xarelto 2.5 mg a day for prevention of thrombosis associated with Revlimid.  Tolerating well without signs or symptoms of bleeding or DVT.      4.  Iron deficiency:  · given her absence of iron stores in her bone marrow she will require IV iron therapy. This could be accommodated at some point between all the infusions and treatments that she needs to have.  3/30/2023: Hemoglobin today 12.3.  4/14/2023 hemoglobin 12.4.    5.  Sjogren's syndrome with myopathy:  · Followed by rheumatology  · On 03/17/2023 the patient has minimal symptomatology pertinent to this at this time besides minimal dryness in her eyes and oral mucosas.    6.  Skin rash-called 3/23/2023 to report skin rash to the bilateral cheeks, neck, spreading towards her shoulders.  She was advised to hold Bactrim.  She was also sent clindamycin gel which she started on Tuesday.  Skin rash is now only present to the bilateral cheeks and much improved.  She will continue clindamycin gel until resolution.  She will continue to hold Bactrim.  · Skin rash improved continuing on clindamycin gel.  · 4/14/20203 currently resolved    PLAN:    · Proceed with cycle 3, day 15 Velcade and Darzalex fas-pro today.  · Continue Revlimid 15 mg daily for days 1 through 21 of a 28-day cycle.  · Continue dexamethasone 20 mg weekly.  Continue to hold Bactrim.  · Continue clindamycin gel if needed for skin rash.  · Continue low-dose Xarelto 2.5 mg daily.  · Continue to hold Bactrim.  · Return in 2 weeks for follow-up visit with Dr. Freedman with repeat labs reevaluation due for cycle 4, day 1 Velcade, Darzalex fas-pro.    Patient is on a high risk medication requiring close monitoring for toxicity.    Mayra Muñoz, APRN  04/14/2023

## 2023-04-17 ENCOUNTER — TELEPHONE (OUTPATIENT)
Dept: ONCOLOGY | Facility: CLINIC | Age: 53
End: 2023-04-17
Payer: COMMERCIAL

## 2023-04-17 DIAGNOSIS — M79.604 PAIN IN BOTH LOWER EXTREMITIES: Primary | ICD-10-CM

## 2023-04-17 DIAGNOSIS — C90.00 MULTIPLE MYELOMA NOT HAVING ACHIEVED REMISSION: ICD-10-CM

## 2023-04-17 DIAGNOSIS — M79.605 PAIN IN BOTH LOWER EXTREMITIES: Primary | ICD-10-CM

## 2023-04-17 NOTE — TELEPHONE ENCOUNTER
Reviewed patients leg pain with Dr. Freedman.  Recommended checking CPK, ferritin, B12 at next visit as well as plain film x-rays of lower extremities.  Called patient today to further discuss.  She reports she actually stayed home from work today because her lefgs were bothering her.  She had swelling yesterday and wore her compression socks all day.  They are improved today but still a little swollen.  She states it is bilateral.  She also feels like she is walking on rocks, which is a new symptom for her.    Patient is on the schedule for Friday for labs and treatment, although she is not actually due for treatment on Friday.  We will keep her appointment for labs, and have her evaluated by nurse practitioner.  She will go for plain film x-rays when she is able to.  She is also scheduled to follow-up with her rheumatologist on Wednesday this week.    Mayra Muñoz, APRN  04/17/2023

## 2023-04-19 ENCOUNTER — HOSPITAL ENCOUNTER (OUTPATIENT)
Dept: GENERAL RADIOLOGY | Facility: HOSPITAL | Age: 53
Discharge: HOME | End: 2023-04-19
Payer: COMMERCIAL

## 2023-04-19 PROCEDURE — 73590 X-RAY EXAM OF LOWER LEG: CPT

## 2023-04-19 PROCEDURE — 73552 X-RAY EXAM OF FEMUR 2/>: CPT

## 2023-04-21 ENCOUNTER — APPOINTMENT (OUTPATIENT)
Dept: ONCOLOGY | Facility: HOSPITAL | Age: 53
End: 2023-04-21
Payer: COMMERCIAL

## 2023-04-21 ENCOUNTER — LAB (OUTPATIENT)
Dept: LAB | Facility: HOSPITAL | Age: 53
End: 2023-04-21
Payer: COMMERCIAL

## 2023-04-21 ENCOUNTER — OFFICE VISIT (OUTPATIENT)
Dept: ONCOLOGY | Facility: CLINIC | Age: 53
End: 2023-04-21
Payer: COMMERCIAL

## 2023-04-21 ENCOUNTER — TELEPHONE (OUTPATIENT)
Dept: PHARMACY | Facility: HOSPITAL | Age: 53
End: 2023-04-21
Payer: COMMERCIAL

## 2023-04-21 VITALS
BODY MASS INDEX: 32.74 KG/M2 | HEART RATE: 65 BPM | RESPIRATION RATE: 18 BRPM | TEMPERATURE: 97.1 F | DIASTOLIC BLOOD PRESSURE: 86 MMHG | SYSTOLIC BLOOD PRESSURE: 130 MMHG | WEIGHT: 196.5 LBS | HEIGHT: 65 IN | OXYGEN SATURATION: 96 %

## 2023-04-21 DIAGNOSIS — C90.00 MULTIPLE MYELOMA NOT HAVING ACHIEVED REMISSION: ICD-10-CM

## 2023-04-21 DIAGNOSIS — M79.605 PAIN IN BOTH LOWER EXTREMITIES: ICD-10-CM

## 2023-04-21 DIAGNOSIS — R60.0 BILATERAL LEG EDEMA: ICD-10-CM

## 2023-04-21 DIAGNOSIS — M79.604 PAIN IN BOTH LOWER EXTREMITIES: ICD-10-CM

## 2023-04-21 DIAGNOSIS — M79.604 PAIN IN BOTH LOWER EXTREMITIES: Primary | ICD-10-CM

## 2023-04-21 DIAGNOSIS — R06.02 SHORTNESS OF BREATH: ICD-10-CM

## 2023-04-21 DIAGNOSIS — M79.10 MUSCLE PAIN: ICD-10-CM

## 2023-04-21 DIAGNOSIS — M79.605 PAIN IN BOTH LOWER EXTREMITIES: Primary | ICD-10-CM

## 2023-04-21 DIAGNOSIS — E04.9 GOITER, NODULAR: ICD-10-CM

## 2023-04-21 LAB
ALBUMIN SERPL-MCNC: 4.4 G/DL (ref 3.5–5.2)
ALBUMIN/GLOB SERPL: 1.6 G/DL (ref 1.1–2.4)
ALP SERPL-CCNC: 65 U/L (ref 38–116)
ALT SERPL W P-5'-P-CCNC: 19 U/L (ref 0–33)
ANION GAP SERPL CALCULATED.3IONS-SCNC: 11.5 MMOL/L (ref 5–15)
AST SERPL-CCNC: 18 U/L (ref 0–32)
BASOPHILS # BLD AUTO: 0.02 10*3/MM3 (ref 0–0.2)
BASOPHILS NFR BLD AUTO: 0.3 % (ref 0–1.5)
BILIRUB SERPL-MCNC: 0.2 MG/DL (ref 0.2–1.2)
BUN SERPL-MCNC: 13 MG/DL (ref 6–20)
BUN/CREAT SERPL: 17.8 (ref 7.3–30)
CALCIUM SPEC-SCNC: 9.7 MG/DL (ref 8.5–10.2)
CHLORIDE SERPL-SCNC: 102 MMOL/L (ref 98–107)
CK SERPL-CCNC: 56 U/L (ref 20–180)
CO2 SERPL-SCNC: 24.5 MMOL/L (ref 22–29)
CREAT SERPL-MCNC: 0.73 MG/DL (ref 0.6–1.1)
DEPRECATED RDW RBC AUTO: 49.8 FL (ref 37–54)
EGFRCR SERPLBLD CKD-EPI 2021: 100.3 ML/MIN/1.73
EOSINOPHIL # BLD AUTO: 0.21 10*3/MM3 (ref 0–0.4)
EOSINOPHIL NFR BLD AUTO: 3 % (ref 0.3–6.2)
ERYTHROCYTE [DISTWIDTH] IN BLOOD BY AUTOMATED COUNT: 15.2 % (ref 12.3–15.4)
FOLATE SERPL-MCNC: >20 NG/ML (ref 4.78–24.2)
GLOBULIN UR ELPH-MCNC: 2.8 GM/DL (ref 1.8–3.5)
GLUCOSE SERPL-MCNC: 88 MG/DL (ref 74–124)
HCT VFR BLD AUTO: 38.1 % (ref 34–46.6)
HGB BLD-MCNC: 12.4 G/DL (ref 12–15.9)
IMM GRANULOCYTES # BLD AUTO: 0.05 10*3/MM3 (ref 0–0.05)
IMM GRANULOCYTES NFR BLD AUTO: 0.7 % (ref 0–0.5)
LYMPHOCYTES # BLD AUTO: 1.93 10*3/MM3 (ref 0.7–3.1)
LYMPHOCYTES NFR BLD AUTO: 28 % (ref 19.6–45.3)
MCH RBC QN AUTO: 29.1 PG (ref 26.6–33)
MCHC RBC AUTO-ENTMCNC: 32.5 G/DL (ref 31.5–35.7)
MCV RBC AUTO: 89.4 FL (ref 79–97)
MONOCYTES # BLD AUTO: 0.92 10*3/MM3 (ref 0.1–0.9)
MONOCYTES NFR BLD AUTO: 13.3 % (ref 5–12)
NEUTROPHILS NFR BLD AUTO: 3.77 10*3/MM3 (ref 1.7–7)
NEUTROPHILS NFR BLD AUTO: 54.7 % (ref 42.7–76)
NRBC BLD AUTO-RTO: 0 /100 WBC (ref 0–0.2)
PLATELET # BLD AUTO: 393 10*3/MM3 (ref 140–450)
PMV BLD AUTO: 10.1 FL (ref 6–12)
POTASSIUM SERPL-SCNC: 3.8 MMOL/L (ref 3.5–4.7)
PROT SERPL-MCNC: 7.2 G/DL (ref 6.3–8)
RBC # BLD AUTO: 4.26 10*6/MM3 (ref 3.77–5.28)
SODIUM SERPL-SCNC: 138 MMOL/L (ref 134–145)
VIT B12 BLD-MCNC: 647 PG/ML (ref 211–946)
WBC NRBC COR # BLD: 6.9 10*3/MM3 (ref 3.4–10.8)

## 2023-04-21 PROCEDURE — 85025 COMPLETE CBC W/AUTO DIFF WBC: CPT

## 2023-04-21 PROCEDURE — 82746 ASSAY OF FOLIC ACID SERUM: CPT | Performed by: NURSE PRACTITIONER

## 2023-04-21 PROCEDURE — 80053 COMPREHEN METABOLIC PANEL: CPT

## 2023-04-21 PROCEDURE — 36415 COLL VENOUS BLD VENIPUNCTURE: CPT

## 2023-04-21 PROCEDURE — 82607 VITAMIN B-12: CPT | Performed by: NURSE PRACTITIONER

## 2023-04-21 PROCEDURE — 82550 ASSAY OF CK (CPK): CPT | Performed by: NURSE PRACTITIONER

## 2023-04-21 NOTE — PROGRESS NOTES
Subjective        REASONS FOR FOLLOWUP:    1. 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 NO ANEMIA, NORMAL CREATININE,NORMAL CALCIUM, NO BONE PAIN NO ADENOPATHIES , NORMAL DIFFERENTIAL IN WBC.  2. LEUKOCYTOSIS AND THROMBOCYTOSIS.  3. GOITER VERY LIKELY HASHIMOTO'S THYROIDITIS.  4. SJOGREN'S SYNDROME.      HISTORY OF PRESENT ILLNESS:  Patient is a 52-year-old female with the above-mentioned history who is here today for lab review and evaluation prior to cycle 3-day 22 Velcade and Darzalex fas-pro.  She continues to complain of pain especially in her legs.  She has now been placed on light duty at work.  She states that her legs just ache quite a bit, similar to when she was initially diagnosed.  She does not really take anything for her pain. She reports the sensation of walking on rocks has improved some this week.  She was given Lyrica however it makes her sleepy and she therefore takes it only a couple of days.    She denies any further rectal bleeding.    She also continues on Revlimid 15 mg daily for 21 out of 28 days.      She did see her rheumatologist this week suggested referral to cardiology due to shortness of breath, fatigue, and leg weakness.  He also recommended multiple medication changes for primary care so I have asked her to make an appointment with her primary care to discuss these.    Past Medical History:   Diagnosis Date   • Achilles tendonitis    • Anemia    • Anxiety    • Arthritis     ankles, shoulders   • Asthma     childhood   • Benign essential hypertension    • Cerebral aneurysm    • GERD (gastroesophageal reflux disease)    • Headache    • History of anemia    • History of E. coli septicemia    • Multiple myeloma 2023   • Rotator cuff syndrome    • Seasonal allergies         Past Surgical History:   Procedure Laterality Date   • ABSCESS DRAINAGE  2000    Renal abscess   • CEREBRAL ANGIOGRAM  2020   • CRANIOTOMY  2020    with angiogram for aneurysm   •  HYSTERECTOMY  2017    partial, both ovaries remain   • ROTATOR CUFF REPAIR Right 2012   • SHOULDER SURGERY          Current Outpatient Medications on File Prior to Visit   Medication Sig Dispense Refill   • acyclovir (ZOVIRAX) 400 MG tablet Take 1 tablet by mouth 2 (Two) Times a Day. 60 tablet 3   • albuterol sulfate  (90 Base) MCG/ACT inhaler Ventolin HFA 90 mcg/actuation aerosol inhaler     • amLODIPine (NORVASC) 5 MG tablet      • clindamycin (Clindagel) 1 % gel Apply 1 application topically to the appropriate area as directed 2 (Two) Times a Day. 30 g 0   • dexamethasone (DECADRON) 4 MG tablet Take 5 tablets by mouth 1 (One) Time Per Week. Take in the morning with food. 20 tablet 3   • Emgality 120 MG/ML auto-injector pen INJECT 1 ML SUBCUTANEOUSLY ONCE EVERY MONTH     • lenalidomide (Revlimid) 15 MG capsule TAKE 1 CAPSULE BY MOUTH ONCE DAILY FOR 21 DAYS ON AND 7 DAYS OFF 21 capsule 0   • levothyroxine (SYNTHROID, LEVOTHROID) 25 MCG tablet      • metoprolol tartrate (LOPRESSOR) 50 MG tablet Take 1 tablet by mouth Every Morning.     • OLANZapine (ZyPREXA) 2.5 MG tablet Take 1 tablet by mouth Every Night. (Patient taking differently: Take 1 tablet by mouth Every Night. Taking PRN) 30 tablet 3   • ondansetron (ZOFRAN) 8 MG tablet Take 1 tablet by mouth 3 (Three) Times a Day As Needed for Nausea or Vomiting. (Patient taking differently: Take 1 tablet by mouth 3 (Three) Times a Day As Needed for Nausea or Vomiting. Taking PRN) 30 tablet 5   • Rimegepant Sulfate (NURTEC) 75 MG tablet dispersible tablet Take 1 tablet by mouth Daily As Needed.     • Rivaroxaban (Xarelto) 2.5 MG tablet Take 1 tablet by mouth Daily. 30 tablet 2   • sulfamethoxazole-trimethoprim (BACTRIM DS,SEPTRA DS) 800-160 MG per tablet Take 1 tablet by mouth 3 (Three) Times a Week. Take 1 tablet on Mondays, Wednesdays and Fridays. 12 tablet 3     No current facility-administered medications on file prior to visit.        ALLERGIES:    Allergies  "  Allergen Reactions   • Coconut Shortness Of Breath   • Eggs Or Egg-Derived Products Diarrhea        Social History     Socioeconomic History   • Marital status:    • Number of children: 3   Tobacco Use   • Smoking status: Never   • Smokeless tobacco: Never   Substance and Sexual Activity   • Alcohol use: Yes   • Drug use: Never        Family History   Problem Relation Age of Onset   • Hypertension Father    • Multiple sclerosis Brother    • Diabetes Brother    • Colon cancer Paternal Aunt    • Heart attack Maternal Grandmother    • Heart disease Maternal Grandmother    • Pancreatic cancer Maternal Grandfather    • Colon cancer Paternal Grandmother    • Heart attack Paternal Grandfather    • Cancer Paternal Grandfather       Objective     Vitals:    04/21/23 1324   BP: 130/86   Pulse: 65   Resp: 18   Temp: 97.1 °F (36.2 °C)   TempSrc: Temporal   SpO2: 96%   Weight: 89.1 kg (196 lb 8 oz)   Height: 165.1 cm (65\")   PainSc:   7   PainLoc: Leg  Comment: left         4/21/2023     1:23 PM   Current Status   ECOG score 0     Physical Exam  Vitals reviewed.   Constitutional:       General: She is not in acute distress.     Appearance: Normal appearance. She is well-developed.   HENT:      Head: Normocephalic and atraumatic.   Eyes:      Pupils: Pupils are equal, round, and reactive to light.   Cardiovascular:      Rate and Rhythm: Normal rate and regular rhythm.      Heart sounds: Normal heart sounds. No murmur heard.  Pulmonary:      Effort: Pulmonary effort is normal. No respiratory distress.      Breath sounds: Normal breath sounds. No wheezing, rhonchi or rales.   Abdominal:      General: Bowel sounds are normal. There is no distension.      Palpations: Abdomen is soft.   Musculoskeletal:         General: No swelling. Normal range of motion.      Cervical back: Normal range of motion.   Skin:     General: Skin is warm and dry.      Findings: No rash.   Neurological:      Mental Status: She is alert and " oriented to person, place, and time.     I have reexamined the patient and the results are consistent with the previously documented exam. NICOLA Schmidt         RECENT LABS:  Hematology WBC   Date Value Ref Range Status   04/21/2023 6.90 3.40 - 10.80 10*3/mm3 Final   12/14/2021 12.38 (H) 4.5 - 11.0 10*3/uL Final     RBC   Date Value Ref Range Status   04/21/2023 4.26 3.77 - 5.28 10*6/mm3 Final   12/14/2021 4.24 4.0 - 5.2 10*6/uL Final     Hemoglobin   Date Value Ref Range Status   04/21/2023 12.4 12.0 - 15.9 g/dL Final   12/14/2021 12.2 12.0 - 16.0 g/dL Final     Hematocrit   Date Value Ref Range Status   04/21/2023 38.1 34.0 - 46.6 % Final   12/14/2021 37.3 36.0 - 46.0 % Final     Platelets   Date Value Ref Range Status   04/21/2023 393 140 - 450 10*3/mm3 Final   12/14/2021 495 (H) 140 - 440 10*3/uL Final       CBC:    WBC   Date Value Ref Range Status   04/21/2023 6.90 3.40 - 10.80 10*3/mm3 Final   12/14/2021 12.38 (H) 4.5 - 11.0 10*3/uL Final     RBC   Date Value Ref Range Status   04/21/2023 4.26 3.77 - 5.28 10*6/mm3 Final   12/14/2021 4.24 4.0 - 5.2 10*6/uL Final     Hemoglobin   Date Value Ref Range Status   04/21/2023 12.4 12.0 - 15.9 g/dL Final   12/14/2021 12.2 12.0 - 16.0 g/dL Final     Hematocrit   Date Value Ref Range Status   04/21/2023 38.1 34.0 - 46.6 % Final   12/14/2021 37.3 36.0 - 46.0 % Final     MCV   Date Value Ref Range Status   04/21/2023 89.4 79.0 - 97.0 fL Final   12/14/2021 88.0 80.0 - 100.0 fL Final     MCH   Date Value Ref Range Status   04/21/2023 29.1 26.6 - 33.0 pg Final   12/14/2021 28.8 26.0 - 34.0 pg Final     MCHC   Date Value Ref Range Status   04/21/2023 32.5 31.5 - 35.7 g/dL Final   12/14/2021 32.7 31.0 - 37.0 g/dL Final     RDW   Date Value Ref Range Status   04/21/2023 15.2 12.3 - 15.4 % Final   12/14/2021 13.9 12.0 - 16.8 % Final     RDW-SD   Date Value Ref Range Status   04/21/2023 49.8 37.0 - 54.0 fl Final     MPV   Date Value Ref Range Status   04/21/2023 10.1 6.0 -  12.0 fL Final   12/14/2021 9.5 8.4 - 12.4 fL Final     Platelets   Date Value Ref Range Status   04/21/2023 393 140 - 450 10*3/mm3 Final   12/14/2021 495 (H) 140 - 440 10*3/uL Final     Neutrophil Rel %   Date Value Ref Range Status   12/14/2021 63.8 45 - 80 % Final     Neutrophil %   Date Value Ref Range Status   04/21/2023 54.7 42.7 - 76.0 % Final     Lymphocyte Rel %   Date Value Ref Range Status   12/14/2021 24.4 15 - 50 % Final     Lymphocyte %   Date Value Ref Range Status   04/21/2023 28.0 19.6 - 45.3 % Final     Monocyte Rel %   Date Value Ref Range Status   12/14/2021 9.0 0 - 15 % Final     Monocyte %   Date Value Ref Range Status   04/21/2023 13.3 (H) 5.0 - 12.0 % Final     Eosinophil %   Date Value Ref Range Status   04/21/2023 3.0 0.3 - 6.2 % Final   12/14/2021 1.4 0 - 7 % Final     Basophil Rel %   Date Value Ref Range Status   12/14/2021 0.6 0 - 2 % Final     Basophil %   Date Value Ref Range Status   04/21/2023 0.3 0.0 - 1.5 % Final     Immature Grans %   Date Value Ref Range Status   04/21/2023 0.7 (H) 0.0 - 0.5 % Final   12/14/2021 0.8 0.0 - 1.0 % Final     Neutrophils Absolute   Date Value Ref Range Status   12/14/2021 7.89 2.0 - 8.8 10*3/uL Final     Neutrophils, Absolute   Date Value Ref Range Status   04/21/2023 3.77 1.70 - 7.00 10*3/mm3 Final     Lymphocytes Absolute   Date Value Ref Range Status   12/14/2021 3.02 0.7 - 5.5 10*3/uL Final     Lymphocytes, Absolute   Date Value Ref Range Status   04/21/2023 1.93 0.70 - 3.10 10*3/mm3 Final     Monocytes Absolute   Date Value Ref Range Status   12/14/2021 1.12 0.0 - 1.7 10*3/uL Final     Monocytes, Absolute   Date Value Ref Range Status   04/21/2023 0.92 (H) 0.10 - 0.90 10*3/mm3 Final     Eosinophils Absolute   Date Value Ref Range Status   12/14/2021 0.17 0.0 - 0.8 10*3/uL Final     Eosinophils, Absolute   Date Value Ref Range Status   04/21/2023 0.21 0.00 - 0.40 10*3/mm3 Final     Basophils Absolute   Date Value Ref Range Status   12/14/2021 0.08  0.0 - 0.2 10*3/uL Final     Basophils, Absolute   Date Value Ref Range Status   04/21/2023 0.02 0.00 - 0.20 10*3/mm3 Final     Immature Grans, Absolute   Date Value Ref Range Status   04/21/2023 0.05 0.00 - 0.05 10*3/mm3 Final   12/14/2021 0.10 0.00 - 0.10 10*3/uL Final     nRBC   Date Value Ref Range Status   04/21/2023 0.0 0.0 - 0.2 /100 WBC Final        CMP:    Glucose   Date Value Ref Range Status   04/07/2023 99 74 - 124 mg/dL Final     BUN   Date Value Ref Range Status   04/07/2023 10 6 - 20 mg/dL Final   08/07/2020 5 (L) 7 - 20 mg/dL Final     Creatinine   Date Value Ref Range Status   04/07/2023 0.80 0.60 - 1.10 mg/dL Final   08/07/2020 0.6 (L) 0.7 - 1.5 mg/dL Final     Sodium   Date Value Ref Range Status   04/07/2023 140 134 - 145 mmol/L Final   08/07/2020 137 137 - 145 mmol/L Final     Potassium   Date Value Ref Range Status   04/07/2023 4.3 3.5 - 4.7 mmol/L Final   08/07/2020 3.5 3.5 - 5.1 mmol/L Final     Chloride   Date Value Ref Range Status   04/07/2023 105 98 - 107 mmol/L Final   08/07/2020 106 98 - 107 mmol/L Final     CO2   Date Value Ref Range Status   04/07/2023 24.0 22.0 - 29.0 mmol/L Final     Total CO2   Date Value Ref Range Status   08/07/2020 21 (L) 22 - 30 mmol/L Final     Calcium   Date Value Ref Range Status   04/07/2023 9.7 8.5 - 10.2 mg/dL Final   08/07/2020 8.4 8.4 - 10.2 mg/dL Final     Total Protein   Date Value Ref Range Status   04/07/2023 7.2 6.3 - 8.0 g/dL Final   08/05/2020 7.5 6.3 - 8.2 g/dL Final     Albumin   Date Value Ref Range Status   04/07/2023 4.2 3.5 - 5.2 g/dL Final   04/07/2023 3.4 2.9 - 4.4 g/dL Final   08/05/2020 4.1 3.5 - 5.0 g/dL Final     ALT (SGPT)   Date Value Ref Range Status   04/07/2023 19 0 - 33 U/L Final   08/05/2020 25 0 - 35 U/L Final     Comment:     If ALT testing ordered prior to 2359 on 11/16/19, please use reference range: Male 0-50, Female 0-35.  [employer] switched to a new ALT assay on 11/16/19 which yields results 10 U/L  lower than the old assay.     AST (SGOT)   Date Value Ref Range Status   04/07/2023 19 0 - 32 U/L Final   08/05/2020 39 15 - 46 U/L Final     Alkaline Phosphatase   Date Value Ref Range Status   04/07/2023 61 38 - 116 U/L Final   08/05/2020 65 38 - 126 U/L Final     Total Bilirubin   Date Value Ref Range Status   04/07/2023 0.2 0.2 - 1.2 mg/dL Final   08/05/2020 0.3 0.2 - 1.3 mg/dL Final     Globulin   Date Value Ref Range Status   04/07/2023 3.0 1.8 - 3.5 gm/dL Final   08/05/2020 3.4 1.5 - 4.5 g/dL Final     A/G Ratio   Date Value Ref Range Status   04/07/2023 1.4 1.1 - 2.4 g/dL Final     BUN/Creatinine Ratio   Date Value Ref Range Status   04/07/2023 12.5 7.3 - 30.0 Final   08/07/2020 8.6 RATIO Final     Anion Gap   Date Value Ref Range Status   04/07/2023 11.0 5.0 - 15.0 mmol/L Final           Assessment & Plan     There are no diagnoses linked to this encounter.   1.  Multiple Myeloma:    · Referred 11/14/2022 for leukocytosis and thrombocytosis by rheumatology, Dr. Ayala.  Diagnosed with Sjogren's by Dr. Ayala.  · History of anemia when she had a cerebral aneurysm clip done in 2019.  · Patient found to have serum protein electrophoresis that disclosed a monoclonal protein   · IgG monoclonal protein in the 2000 category, Bence-Moon protein in the urine, very small amount that is not quantifiable.  · Recommend patient proceed with bone marrow testing.  · 1/24/2023 bone marrow biopsy  · Bone marrow biopsy showing 18% plasma cells infiltrating in the bone marrow. Also absence of iron stores. The patient had no evidence of myelofibrosis, lymphoma, leukemia, or myelodysplasia.   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 [place] red stain was negative for amyloid deposition  · Recommended treatment with combination of Darzalex once a week, Velcade subcutaneously once a week, 3 weeks out of 4, Revlimid 15 mg a day for 3 weeks out of a month and dexamethasone 20 mg  once a week.  · Plan to treat the patient for the next 4 months following her monoclonal protein and eventually referred to [facility] for consideration of high-dose chemotherapy and stem cell transplant.  · No evidence of lytic lesions, therefore will not use Zometa or Xgeva at this time.  · 2/3/2023 cycle 1 Darzalex Faspro and Velcade.  · 02/10/2023 the patient was further reviewed the day that she comes for her 2nd Darzalex infusion and 2nd Velcade injection. So far the patient has not encountered any side effects of the medicines.  · Seen 2/24/2023 due for cycle 1 day 22.  Patient started her Revlimid 15 mg daily for 3 out of 4 weeks last Wednesday.  So far she is tolerating all of her treatment fairly well without any significant side effects other than ongoing fatigue.  · 3/10/2023 here for cycle 2, day 8 Velcade, Darzalex fast pro, continuing on Revlimid 15 mg daily for 3 weeks on, 1 week off as well as dexamethasone 20 mg weekly.  We will recheck paraprotein studies today.  On 03/17/2023,  She has had resolution of pain in her lower extremities.  Her monoclonal protein has dropped by half in only 1 month of therapy.  She questions referral to BMT, which will be made to Dr. Plasencia in May or June.  3/31/2023: C3D1 Darzalex and Velcade.  Continues Revlimid at home, currently on her second week on Revlimid.  Continues dexamethasone weekly.  Tolerating well.  4/7/2023 seen today for cycle 3, day 8.  She will receive Velcade only today (Darzalex which is every other week).  She will continue her Revlimid 15 mg daily for 21 out of 28 days, as well as dexamethasone 20 mg weekly.  Repeat paraprotein studies pending for today.  4/14/2023 due for cycle 3, day 15 Velcade continuing to complain of leg pain.  We reviewed M spike improved to 0.5,Free light chain kappa 22.2, kappa/lambda ratio 1.8.   4/21/2023 patient returns for cycle 3-day 22 for follow-up of her leg pain.  X-rays performed 4/19/2023 show  bilateral knee degeneration however otherwise unremarkable.  Patient did see rheumatology this week and they made multiple medication changes suggestions for her primary care and have asked the patient to make an appointment with them.  We will go ahead and place a referral for cardiology evaluation as recommended by rheumatology.  Case discussed with Dr. Freedman today and we will proceed with bilateral lower extremity venous Doppler prior to her next visit.  Dr. Freedman will see her in 1 week.  Additional labs obtained today including B12, folate, CK all unremarkable.    2.  Nodular goiter: she has a normal TSH and a normal T4. I will let Beau Frye MD, the patient's Primary Physician address this issue eventually with an ultrasound and clinical examination. The patient has antithyroid antibody positivity and very likely in my opinion she probably has a component of Hashimoto's thyroiditis.   On 03/17/2023 her goiter is no different today than what it was during the original consultation. This will be watched in absence of any other intervention.    3.  Prophylaxis: recommend she start low-dose Xarelto 2.5 mg p.o. daily after she has had her molar extracted for initiation and before initiation of her Revlimid administration.   Continue taking low-dose Xarelto 2.5 mg a day for prevention of thrombosis associated with Revlimid.  Tolerating well without signs or symptoms of bleeding or DVT.      4.  Iron deficiency:  · given her absence of iron stores in her bone marrow she will require IV iron therapy. This could be accommodated at some point between all the infusions and treatments that she needs to have.  3/30/2023: Hemoglobin  12.3.  4/21/2023 hemoglobin 12.4.    5.  Sjogren's syndrome with myopathy:  · Followed by rheumatology  · On 03/17/2023 the patient has minimal symptomatology pertinent to this at this time besides minimal dryness in her eyes and oral mucosas.    6.  Skin rash-called 3/23/2023 to report skin  rash to the bilateral cheeks, neck, spreading towards her shoulders.  She was advised to hold Bactrim.  She was also sent clindamycin gel which she started on Tuesday.  Skin rash is now only present to the bilateral cheeks and much improved.  She will continue clindamycin gel until resolution.  She will continue to hold Bactrim.  · Skin rash improved continuing on clindamycin gel.  · 4/14/20203 currently resolved    PLAN:   · Bilateral lower extremity venous Doppler prior to next visit  · Patient to make appointment with primary care to discuss rheumatology recommendations  · Referral to cardiology for evaluation shortness of breath, bilateral lower extremity weakness and pain  · Continue Revlimid 15 mg daily for days 1 through 21 of a 28-day cycle.  · Continue dexamethasone 20 mg weekly.  Continue to hold Bactrim.  · Continue clindamycin gel if needed for skin rash.  · Continue low-dose Xarelto 2.5 mg daily.  · Continue to hold Bactrim.  · Return in 1 week for follow-up visit with Dr. Freedman with repeat labs reevaluation due for cycle 4, day 1 Velcade, Darzalex fas-pro.    Patient is on a high risk medication requiring close monitoring for toxicity.    Jo-Ann Camilo, APRN  04/21/2023

## 2023-04-21 NOTE — TELEPHONE ENCOUNTER
I have obtained her copay card for the Darzalex Faspro. Please call Nydia Little's direct line at [phone].

## 2023-04-27 ENCOUNTER — SPECIALTY PHARMACY (OUTPATIENT)
Dept: PHARMACY | Facility: HOSPITAL | Age: 53
End: 2023-04-27
Payer: COMMERCIAL

## 2023-04-27 ENCOUNTER — HOSPITAL ENCOUNTER (OUTPATIENT)
Dept: CARDIOLOGY | Facility: HOSPITAL | Age: 53
Discharge: HOME | End: 2023-04-27
Payer: COMMERCIAL

## 2023-04-27 DIAGNOSIS — C90.00 MULTIPLE MYELOMA NOT HAVING ACHIEVED REMISSION: ICD-10-CM

## 2023-04-27 DIAGNOSIS — M79.604 PAIN IN BOTH LOWER EXTREMITIES: ICD-10-CM

## 2023-04-27 DIAGNOSIS — R60.0 BILATERAL LEG EDEMA: ICD-10-CM

## 2023-04-27 DIAGNOSIS — M79.605 PAIN IN BOTH LOWER EXTREMITIES: ICD-10-CM

## 2023-04-27 LAB

## 2023-04-27 PROCEDURE — 93970 EXTREMITY STUDY: CPT

## 2023-04-27 RX ORDER — LENALIDOMIDE 15 MG/1
CAPSULE ORAL
Qty: 21 CAPSULE | Refills: 0 | Status: SHIPPED | OUTPATIENT
Start: 2023-04-27

## 2023-04-27 NOTE — PROGRESS NOTES
Re: Refills of Oral Specialty Medication - Revlimid (lenalidomide)    • Drug-Drug Interactions: The current medication list was reviewed and there are no relevant drug-drug interactions.  • Medication Allergies: The patient has no relevant allergies as it relates to their oral specialty medication  • Review of Labs/Dose Adjustments: NO DOSE CHANGE - I reviewed the most recent note and labs and the patient will continue without any dose changes.  I sent refills as described below.    Drug: Revlimid (lenalidomide)  Strength: 15 mg  Directions: Take 1 capsule by mouth daily for 21 days on, then 7 days off  Quantity: 21  Refills: 0  Pharmacy prescription sent to: [facility]    Name/Credentials Dinorah Hood RPh, BCOP    4/27/2023  08:53 EDT       Completed independent double check on medication order/RX.    Tomasz Turner, Narendra, BCOP

## 2023-04-27 NOTE — PROGRESS NOTES
Subjective        REASONS FOR FOLLOWUP:    1. 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 NO ANEMIA, NORMAL CREATININE,NORMAL CALCIUM, NO BONE PAIN NO ADENOPATHIES , NORMAL DIFFERENTIAL IN WBC.  2. LEUKOCYTOSIS AND THROMBOCYTOSIS.  3. GOITER VERY LIKELY HASHIMOTO'S THYROIDITIS.  4. SJOGREN'S SYNDROME.    HISTORY OF PRESENT ILLNESS:  Patient is a 52-year-old female with the above-mentioned history who is here today for lab review and evaluation prior to cycle 4-day 1 Darzalex and Velcade.  She also continues on Revlimid 15 mg daily days 1-21 every 28 days. She is seen today with her daughter present.  She thinks she is currently on her third week of Revlimid, and will start her off week on Sunday.  She does have this written down at home, though.  Appetite is stable.  She has occasional diarrhea at night time, feels this may be related to diet.  Having 1-2 diarrhea episodes at a time, not requiring antidiarrheals.  She continues to have swelling/tightness in the bilateral lower extremities, this remains stable.  She did see her PCP who discontinued amlodipine and started her on losartan.  She is scheduled to see cardiology 5/12/2023.  Denies fever or chills. Denies nausea or vomiting. Denies new or worsening pain.    Past Medical History:   Diagnosis Date   • Achilles tendonitis    • Anemia    • Anxiety    • Arthritis     ankles, shoulders   • Asthma     childhood   • Benign essential hypertension    • Cerebral aneurysm    • GERD (gastroesophageal reflux disease)    • Headache    • History of anemia    • History of E. coli septicemia    • Multiple myeloma 2023   • Rotator cuff syndrome    • Seasonal allergies         Past Surgical History:   Procedure Laterality Date   • ABSCESS DRAINAGE  2000    Renal abscess   • CEREBRAL ANGIOGRAM  2020   • CRANIOTOMY  2020    with angiogram for aneurysm   • HYSTERECTOMY  2017    partial, both ovaries remain   • ROTATOR CUFF REPAIR Right 2012   •  SHOULDER SURGERY          Current Outpatient Medications on File Prior to Visit   Medication Sig Dispense Refill   • acyclovir (ZOVIRAX) 400 MG tablet Take 1 tablet by mouth 2 (Two) Times a Day. 60 tablet 3   • albuterol sulfate  (90 Base) MCG/ACT inhaler Ventolin HFA 90 mcg/actuation aerosol inhaler     • clindamycin (Clindagel) 1 % gel Apply 1 application topically to the appropriate area as directed 2 (Two) Times a Day. 30 g 0   • dexamethasone (DECADRON) 4 MG tablet Take 5 tablets by mouth 1 (One) Time Per Week. Take in the morning with food. 20 tablet 3   • Emgality 120 MG/ML auto-injector pen INJECT 1 ML SUBCUTANEOUSLY ONCE EVERY MONTH     • lenalidomide (Revlimid) 15 MG capsule TAKE 1 CAPSULE BY MOUTH ONCE DAILY FOR 21 DAYS ON AND 7 DAYS OFF 21 capsule 0   • levothyroxine (SYNTHROID, LEVOTHROID) 25 MCG tablet      • losartan (COZAAR) 50 MG tablet TAKE 1 TABLET BY MOUTH ONCE DAILY WITH MEALS FOR 30 DAYS     • metoprolol tartrate (LOPRESSOR) 50 MG tablet Take 1 tablet by mouth Every Morning.     • OLANZapine (ZyPREXA) 2.5 MG tablet Take 1 tablet by mouth Every Night. (Patient taking differently: Take 1 tablet by mouth Every Night. Taking PRN) 30 tablet 3   • ondansetron (ZOFRAN) 8 MG tablet Take 1 tablet by mouth 3 (Three) Times a Day As Needed for Nausea or Vomiting. (Patient taking differently: Take 1 tablet by mouth 3 (Three) Times a Day As Needed for Nausea or Vomiting. Taking PRN) 30 tablet 5   • Rimegepant Sulfate (NURTEC) 75 MG tablet dispersible tablet Take 1 tablet by mouth Daily As Needed.     • Rivaroxaban (Xarelto) 2.5 MG tablet Take 1 tablet by mouth Daily. 30 tablet 2   • sulfamethoxazole-trimethoprim (BACTRIM DS,SEPTRA DS) 800-160 MG per tablet Take 1 tablet by mouth 3 (Three) Times a Week. Take 1 tablet on Mondays, Wednesdays and Fridays. 12 tablet 3   • [DISCONTINUED] amLODIPine (NORVASC) 5 MG tablet        No current facility-administered medications on file prior to visit.     "    ALLERGIES:    Allergies   Allergen Reactions   • Coconut Shortness Of Breath   • Eggs Or Egg-Derived Products Diarrhea        Social History     Socioeconomic History   • Marital status:    • Number of children: 3   Tobacco Use   • Smoking status: Never   • Smokeless tobacco: Never   Substance and Sexual Activity   • Alcohol use: Yes   • Drug use: Never        Family History   Problem Relation Age of Onset   • Hypertension Father    • Multiple sclerosis Brother    • Diabetes Brother    • Colon cancer Paternal Aunt    • Heart attack Maternal Grandmother    • Heart disease Maternal Grandmother    • Pancreatic cancer Maternal Grandfather    • Colon cancer Paternal Grandmother    • Heart attack Paternal Grandfather    • Cancer Paternal Grandfather       Objective     Vitals:    04/28/23 1337   BP: 146/93   Pulse: 64   Resp: 18   Temp: 98.6 °F (37 °C)   TempSrc: Temporal   SpO2: 98%   Weight: 89.3 kg (196 lb 12.8 oz)   Height: 165.1 cm (65\")   PainSc:   3   PainLoc: Abdomen         4/28/2023     1:32 PM   Current Status   ECOG score 0     Physical Exam  Vitals reviewed.   Constitutional:       General: She is not in acute distress.     Appearance: Normal appearance. She is well-developed.   HENT:      Head: Normocephalic and atraumatic.   Eyes:      Pupils: Pupils are equal, round, and reactive to light.   Cardiovascular:      Rate and Rhythm: Normal rate and regular rhythm.      Heart sounds: Normal heart sounds. No murmur heard.  Pulmonary:      Effort: Pulmonary effort is normal. No respiratory distress.      Breath sounds: Normal breath sounds. No wheezing, rhonchi or rales.   Abdominal:      General: Bowel sounds are normal. There is no distension.      Palpations: Abdomen is soft.   Musculoskeletal:         General: No swelling. Normal range of motion.      Cervical back: Normal range of motion.   Skin:     General: Skin is warm and dry.      Findings: No rash.   Neurological:      Mental Status: She is " alert and oriented to person, place, and time.     I have reexamined the patient and the results are consistent with the previously documented exam. NICOLA Read         RECENT LABS:  Hematology WBC   Date Value Ref Range Status   04/28/2023 9.94 3.40 - 10.80 10*3/mm3 Final   12/14/2021 12.38 (H) 4.5 - 11.0 10*3/uL Final     RBC   Date Value Ref Range Status   04/28/2023 4.10 3.77 - 5.28 10*6/mm3 Final   12/14/2021 4.24 4.0 - 5.2 10*6/uL Final     Hemoglobin   Date Value Ref Range Status   04/28/2023 12.0 12.0 - 15.9 g/dL Final   12/14/2021 12.2 12.0 - 16.0 g/dL Final     Hematocrit   Date Value Ref Range Status   04/28/2023 37.5 34.0 - 46.6 % Final   12/14/2021 37.3 36.0 - 46.0 % Final     Platelets   Date Value Ref Range Status   04/28/2023 427 140 - 450 10*3/mm3 Final   12/14/2021 495 (H) 140 - 440 10*3/uL Final       CBC:    WBC   Date Value Ref Range Status   04/28/2023 9.94 3.40 - 10.80 10*3/mm3 Final   12/14/2021 12.38 (H) 4.5 - 11.0 10*3/uL Final     RBC   Date Value Ref Range Status   04/28/2023 4.10 3.77 - 5.28 10*6/mm3 Final   12/14/2021 4.24 4.0 - 5.2 10*6/uL Final     Hemoglobin   Date Value Ref Range Status   04/28/2023 12.0 12.0 - 15.9 g/dL Final   12/14/2021 12.2 12.0 - 16.0 g/dL Final     Hematocrit   Date Value Ref Range Status   04/28/2023 37.5 34.0 - 46.6 % Final   12/14/2021 37.3 36.0 - 46.0 % Final     MCV   Date Value Ref Range Status   04/28/2023 91.5 79.0 - 97.0 fL Final   12/14/2021 88.0 80.0 - 100.0 fL Final     MCH   Date Value Ref Range Status   04/28/2023 29.3 26.6 - 33.0 pg Final   12/14/2021 28.8 26.0 - 34.0 pg Final     MCHC   Date Value Ref Range Status   04/28/2023 32.0 31.5 - 35.7 g/dL Final   12/14/2021 32.7 31.0 - 37.0 g/dL Final     RDW   Date Value Ref Range Status   04/28/2023 15.4 12.3 - 15.4 % Final   12/14/2021 13.9 12.0 - 16.8 % Final     RDW-SD   Date Value Ref Range Status   04/28/2023 51.1 37.0 - 54.0 fl Final     MPV   Date Value Ref Range Status   04/28/2023  9.9 6.0 - 12.0 fL Final   12/14/2021 9.5 8.4 - 12.4 fL Final     Platelets   Date Value Ref Range Status   04/28/2023 427 140 - 450 10*3/mm3 Final   12/14/2021 495 (H) 140 - 440 10*3/uL Final     Neutrophil Rel %   Date Value Ref Range Status   12/14/2021 63.8 45 - 80 % Final     Neutrophil %   Date Value Ref Range Status   04/28/2023 66.5 42.7 - 76.0 % Final     Lymphocyte Rel %   Date Value Ref Range Status   12/14/2021 24.4 15 - 50 % Final     Lymphocyte %   Date Value Ref Range Status   04/28/2023 19.5 (L) 19.6 - 45.3 % Final     Monocyte Rel %   Date Value Ref Range Status   12/14/2021 9.0 0 - 15 % Final     Monocyte %   Date Value Ref Range Status   04/28/2023 9.9 5.0 - 12.0 % Final     Eosinophil %   Date Value Ref Range Status   04/28/2023 2.9 0.3 - 6.2 % Final   12/14/2021 1.4 0 - 7 % Final     Basophil Rel %   Date Value Ref Range Status   12/14/2021 0.6 0 - 2 % Final     Basophil %   Date Value Ref Range Status   04/28/2023 0.5 0.0 - 1.5 % Final     Immature Grans %   Date Value Ref Range Status   04/28/2023 0.7 (H) 0.0 - 0.5 % Final   12/14/2021 0.8 0.0 - 1.0 % Final     Neutrophils Absolute   Date Value Ref Range Status   12/14/2021 7.89 2.0 - 8.8 10*3/uL Final     Neutrophils, Absolute   Date Value Ref Range Status   04/28/2023 6.61 1.70 - 7.00 10*3/mm3 Final     Lymphocytes Absolute   Date Value Ref Range Status   12/14/2021 3.02 0.7 - 5.5 10*3/uL Final     Lymphocytes, Absolute   Date Value Ref Range Status   04/28/2023 1.94 0.70 - 3.10 10*3/mm3 Final     Monocytes Absolute   Date Value Ref Range Status   12/14/2021 1.12 0.0 - 1.7 10*3/uL Final     Monocytes, Absolute   Date Value Ref Range Status   04/28/2023 0.98 (H) 0.10 - 0.90 10*3/mm3 Final     Eosinophils Absolute   Date Value Ref Range Status   12/14/2021 0.17 0.0 - 0.8 10*3/uL Final     Eosinophils, Absolute   Date Value Ref Range Status   04/28/2023 0.29 0.00 - 0.40 10*3/mm3 Final     Basophils Absolute   Date Value Ref Range Status    12/14/2021 0.08 0.0 - 0.2 10*3/uL Final     Basophils, Absolute   Date Value Ref Range Status   04/28/2023 0.05 0.00 - 0.20 10*3/mm3 Final     Immature Grans, Absolute   Date Value Ref Range Status   04/28/2023 0.07 (H) 0.00 - 0.05 10*3/mm3 Final   12/14/2021 0.10 0.00 - 0.10 10*3/uL Final     nRBC   Date Value Ref Range Status   04/28/2023 0.0 0.0 - 0.2 /100 WBC Final        CMP:    Glucose   Date Value Ref Range Status   04/21/2023 88 74 - 124 mg/dL Final     BUN   Date Value Ref Range Status   04/21/2023 13 6 - 20 mg/dL Final   08/07/2020 5 (L) 7 - 20 mg/dL Final     Creatinine   Date Value Ref Range Status   04/21/2023 0.73 0.60 - 1.10 mg/dL Final   08/07/2020 0.6 (L) 0.7 - 1.5 mg/dL Final     Sodium   Date Value Ref Range Status   04/21/2023 138 134 - 145 mmol/L Final   08/07/2020 137 137 - 145 mmol/L Final     Potassium   Date Value Ref Range Status   04/21/2023 3.8 3.5 - 4.7 mmol/L Final   08/07/2020 3.5 3.5 - 5.1 mmol/L Final     Chloride   Date Value Ref Range Status   04/21/2023 102 98 - 107 mmol/L Final   08/07/2020 106 98 - 107 mmol/L Final     CO2   Date Value Ref Range Status   04/21/2023 24.5 22.0 - 29.0 mmol/L Final     Total CO2   Date Value Ref Range Status   08/07/2020 21 (L) 22 - 30 mmol/L Final     Calcium   Date Value Ref Range Status   04/21/2023 9.7 8.5 - 10.2 mg/dL Final   08/07/2020 8.4 8.4 - 10.2 mg/dL Final     Total Protein   Date Value Ref Range Status   04/21/2023 7.2 6.3 - 8.0 g/dL Final   08/05/2020 7.5 6.3 - 8.2 g/dL Final     Albumin   Date Value Ref Range Status   04/21/2023 4.4 3.5 - 5.2 g/dL Final   08/05/2020 4.1 3.5 - 5.0 g/dL Final     ALT (SGPT)   Date Value Ref Range Status   04/21/2023 19 0 - 33 U/L Final   08/05/2020 25 0 - 35 U/L Final     Comment:     If ALT testing ordered prior to 2359 on 11/16/19, please use reference range: Male 0-50, Female 0-35.  [facility] [employer] switched to a new ALT assay on 11/16/19 which yields results 10 U/L lower than the old  assay.     AST (SGOT)   Date Value Ref Range Status   04/21/2023 18 0 - 32 U/L Final   08/05/2020 39 15 - 46 U/L Final     Alkaline Phosphatase   Date Value Ref Range Status   04/21/2023 65 38 - 116 U/L Final   08/05/2020 65 38 - 126 U/L Final     Total Bilirubin   Date Value Ref Range Status   04/21/2023 0.2 0.2 - 1.2 mg/dL Final   08/05/2020 0.3 0.2 - 1.3 mg/dL Final     Globulin   Date Value Ref Range Status   04/21/2023 2.8 1.8 - 3.5 gm/dL Final   08/05/2020 3.4 1.5 - 4.5 g/dL Final     A/G Ratio   Date Value Ref Range Status   04/21/2023 1.6 1.1 - 2.4 g/dL Final     BUN/Creatinine Ratio   Date Value Ref Range Status   04/21/2023 17.8 7.3 - 30.0 Final   08/07/2020 8.6 RATIO Final     Anion Gap   Date Value Ref Range Status   04/21/2023 11.5 5.0 - 15.0 mmol/L Final           Assessment & Plan     Diagnoses and all orders for this visit:    1. Multiple myeloma not having achieved remission (Primary)  -     CBC and Differential; Future  -     Comprehensive metabolic panel; Future    Other orders  -     famotidine (PEPCID) tablet 20 mg  -     acetaminophen (TYLENOL) tablet 1,000 mg  -     diphenhydrAMINE (BENADRYL) capsule 25 mg  -     bortezomib (VELCADE) chemo injection 2.5 mg  -     daratumumab-hyaluronidase-fihj (DARZALEX FASPRO) [id number] MG-UT/15ML injection 1,800 mg  -     Hydrocortisone Sod Suc (PF) (Solu-CORTEF) injection 100 mg  -     diphenhydrAMINE (BENADRYL) injection 50 mg  -     famotidine (PEPCID) injection 20 mg       1.  Multiple Myeloma:    · Referred 11/14/2022 for leukocytosis and thrombocytosis by rheumatology, Dr. Ayala.  Diagnosed with Sjogren's by Dr. Ayala.  · History of anemia when she had a cerebral aneurysm clip done in 2019.  · Patient found to have serum protein electrophoresis that disclosed a monoclonal protein   · IgG monoclonal protein in the 2000 category, Bence-Moon protein in the urine, very small amount that is not quantifiable.  · Recommend patient proceed with bone marrow  testing.  · 1/24/2023 bone marrow biopsy  · Bone marrow biopsy showing 18% plasma cells infiltrating in the bone marrow. Also absence of iron stores. The patient had no evidence of myelofibrosis, lymphoma, leukemia, or myelodysplasia.   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 [place] red stain was negative for amyloid deposition  · Recommended treatment with combination of Darzalex once a week, Velcade subcutaneously once a week, 3 weeks out of 4, Revlimid 15 mg a day for 3 weeks out of a month and dexamethasone 20 mg once a week.  · Plan to treat the patient for the next 4 months following her monoclonal protein and eventually referred to [facility] for consideration of high-dose chemotherapy and stem cell transplant.  · No evidence of lytic lesions, therefore will not use Zometa or Xgeva at this time.  · 2/3/2023 cycle 1 Darzalex Faspro and Velcade.  · 02/10/2023 the patient was further reviewed the day that she comes for her 2nd Darzalex infusion and 2nd Velcade injection. So far the patient has not encountered any side effects of the medicines.  · Seen 2/24/2023 due for cycle 1 day 22.  Patient started her Revlimid 15 mg daily for 3 out of 4 weeks last Wednesday.  So far she is tolerating all of her treatment fairly well without any significant side effects other than ongoing fatigue.  · 3/10/2023 here for cycle 2, day 8 Velcade, Darzalex fast pro, continuing on Revlimid 15 mg daily for 3 weeks on, 1 week off as well as dexamethasone 20 mg weekly.  We will recheck paraprotein studies today.  On 03/17/2023,  She has had resolution of pain in her lower extremities.  Her monoclonal protein has dropped by half in only 1 month of therapy.  She questions referral to BMT, which will be made to Dr. Plasencia in May or June.  3/31/2023: C3D1 Darzalex and Velcade.  Continues Revlimid at home, currently on her second week on Revlimid.  Continues dexamethasone  weekly.  Tolerating well.  4/7/2023 seen today for cycle 3, day 8.  She will receive Velcade only today (Darzalex which is every other week).  She will continue her Revlimid 15 mg daily for 21 out of 28 days, as well as dexamethasone 20 mg weekly.  Repeat paraprotein studies pending for today.  4/14/2023 due for cycle 3, day 15 Velcade continuing to complain of leg pain.  We reviewed M spike improved to 0.5,Free light chain kappa 22.2, kappa/lambda ratio 1.8.   4/21/2023 patient returns for cycle 3-day 22 for follow-up of her leg pain.  X-rays performed 4/19/2023 show bilateral knee degeneration however otherwise unremarkable.  Patient did see rheumatology this week and they made multiple medication changes suggestions for her primary care and have asked the patient to make an appointment with them.  We will go ahead and place a referral for cardiology evaluation as recommended by rheumatology.  Case discussed with Dr. Freedman today and we will proceed with bilateral lower extremity venous Doppler prior to her next visit.   Additional labs obtained including B12, folate, CK all unremarkable.  4/28/2023: Proceed with cycle 4-day 1 Darzalex and Velcade today.  Bilateral Doppler negative for DVT.  Consult with cardiology scheduled 5/12/2023.  Continues with swelling/tightness to the bilateral lower extremities.  Saw her PCP who discontinued amlodipine and started losartan.  Scheduled to see cardiology 5/12/2023.  She does feel the swelling/tightness is stable.    2.  Nodular goiter: she has a normal TSH and a normal T4. I will let Beau Frye MD, the patient's Primary Physician address this issue eventually with an ultrasound and clinical examination. The patient has antithyroid antibody positivity and very likely in my opinion she probably has a component of Hashimoto's thyroiditis.   On 03/17/2023 her goiter is no different today than what it was during the original consultation. This will be watched in absence of any  other intervention.    3.  Prophylaxis: recommend she start low-dose Xarelto 2.5 mg p.o. daily after she has had her molar extracted for initiation and before initiation of her Revlimid administration.   Continue taking low-dose Xarelto 2.5 mg a day for prevention of thrombosis associated with Revlimid.  Tolerating well without signs or symptoms of bleeding or DVT.      4.  Iron deficiency:  · given her absence of iron stores in her bone marrow she will require IV iron therapy. This could be accommodated at some point between all the infusions and treatments that she needs to have.  3/30/2023: Hemoglobin  12.3.  4/21/2023 hemoglobin 12.4.  Hemoglobin today 12.0.    5.  Sjogren's syndrome with myopathy:  · Followed by rheumatology  · On 03/17/2023 the patient has minimal symptomatology pertinent to this at this time besides minimal dryness in her eyes and oral mucosas.    6.  Skin rash-called 3/23/2023 to report skin rash to the bilateral cheeks, neck, spreading towards her shoulders.  She was advised to hold Bactrim.  She was also sent clindamycin gel which she started on Tuesday.  Skin rash is now only present to the bilateral cheeks and much improved.  She will continue clindamycin gel until resolution.  She will continue to hold Bactrim.  · Skin rash improved continuing on clindamycin gel.  · Resolved.    PLAN:   · Proceed with cycle 4-day 1 Darzalex and Velcade.  · Continue Revlimid 15 mg daily days 1-21 every 28 days.  · Continue dexamethasone 20 mg weekly.  · Reviewed bilateral lower extremity venous Doppler, negative.  · Patient will schedule follow-up with primary care to discuss rheumatology recommendations  · Referral to cardiology for evaluation shortness of breath, bilateral lower extremity weakness and pain.  Scheduled 5/12/2023.  · Continue to hold Bactrim.  · Continue clindamycin gel if needed for skin rash.  · Continue low-dose Xarelto 2.5 mg daily.  · Return in 1 week for MD/NP and cycle 4-day 8  Velcade.  · Return in 2 weeks for MD/NP and cycle 4-day 15 Darzalex and Velcade.    Patient is on a high risk medication requiring close monitoring for toxicity.    Veronica Luther, NICOLA  04/28/2023

## 2023-04-28 ENCOUNTER — INFUSION (OUTPATIENT)
Dept: ONCOLOGY | Facility: HOSPITAL | Age: 53
End: 2023-04-28
Payer: COMMERCIAL

## 2023-04-28 ENCOUNTER — LAB (OUTPATIENT)
Dept: LAB | Facility: HOSPITAL | Age: 53
End: 2023-04-28
Payer: COMMERCIAL

## 2023-04-28 ENCOUNTER — OFFICE VISIT (OUTPATIENT)
Dept: ONCOLOGY | Facility: CLINIC | Age: 53
End: 2023-04-28
Payer: COMMERCIAL

## 2023-04-28 VITALS
TEMPERATURE: 98.6 F | WEIGHT: 196.8 LBS | SYSTOLIC BLOOD PRESSURE: 146 MMHG | HEART RATE: 64 BPM | HEIGHT: 65 IN | RESPIRATION RATE: 18 BRPM | DIASTOLIC BLOOD PRESSURE: 93 MMHG | OXYGEN SATURATION: 98 % | BODY MASS INDEX: 32.79 KG/M2

## 2023-04-28 DIAGNOSIS — C90.00 MULTIPLE MYELOMA NOT HAVING ACHIEVED REMISSION: Primary | ICD-10-CM

## 2023-04-28 DIAGNOSIS — E04.9 GOITER, NODULAR: ICD-10-CM

## 2023-04-28 DIAGNOSIS — C90.00 MULTIPLE MYELOMA NOT HAVING ACHIEVED REMISSION: ICD-10-CM

## 2023-04-28 DIAGNOSIS — R60.0 BILATERAL LEG EDEMA: ICD-10-CM

## 2023-04-28 DIAGNOSIS — M79.10 MUSCLE PAIN: ICD-10-CM

## 2023-04-28 DIAGNOSIS — Z79.899 HIGH RISK MEDICATION USE: ICD-10-CM

## 2023-04-28 LAB
ALBUMIN SERPL-MCNC: 4.2 G/DL (ref 3.5–5.2)
ALBUMIN/GLOB SERPL: 1.6 G/DL (ref 1.1–2.4)
ALP SERPL-CCNC: 65 U/L (ref 38–116)
ALT SERPL W P-5'-P-CCNC: 14 U/L (ref 0–33)
ANION GAP SERPL CALCULATED.3IONS-SCNC: 14.8 MMOL/L (ref 5–15)
AST SERPL-CCNC: 13 U/L (ref 0–32)
BASOPHILS # BLD AUTO: 0.05 10*3/MM3 (ref 0–0.2)
BASOPHILS NFR BLD AUTO: 0.5 % (ref 0–1.5)
BILIRUB SERPL-MCNC: 0.2 MG/DL (ref 0.2–1.2)
BUN SERPL-MCNC: 11 MG/DL (ref 6–20)
BUN/CREAT SERPL: 15.9 (ref 7.3–30)
CALCIUM SPEC-SCNC: 9.6 MG/DL (ref 8.5–10.2)
CHLORIDE SERPL-SCNC: 102 MMOL/L (ref 98–107)
CO2 SERPL-SCNC: 22.2 MMOL/L (ref 22–29)
CREAT SERPL-MCNC: 0.69 MG/DL (ref 0.6–1.1)
DEPRECATED RDW RBC AUTO: 51.1 FL (ref 37–54)
EGFRCR SERPLBLD CKD-EPI 2021: 105.9 ML/MIN/1.73
EOSINOPHIL # BLD AUTO: 0.29 10*3/MM3 (ref 0–0.4)
EOSINOPHIL NFR BLD AUTO: 2.9 % (ref 0.3–6.2)
ERYTHROCYTE [DISTWIDTH] IN BLOOD BY AUTOMATED COUNT: 15.4 % (ref 12.3–15.4)
GLOBULIN UR ELPH-MCNC: 2.7 GM/DL (ref 1.8–3.5)
GLUCOSE SERPL-MCNC: 97 MG/DL (ref 74–124)
HCT VFR BLD AUTO: 37.5 % (ref 34–46.6)
HGB BLD-MCNC: 12 G/DL (ref 12–15.9)
IMM GRANULOCYTES # BLD AUTO: 0.07 10*3/MM3 (ref 0–0.05)
IMM GRANULOCYTES NFR BLD AUTO: 0.7 % (ref 0–0.5)
LYMPHOCYTES # BLD AUTO: 1.94 10*3/MM3 (ref 0.7–3.1)
LYMPHOCYTES NFR BLD AUTO: 19.5 % (ref 19.6–45.3)
MCH RBC QN AUTO: 29.3 PG (ref 26.6–33)
MCHC RBC AUTO-ENTMCNC: 32 G/DL (ref 31.5–35.7)
MCV RBC AUTO: 91.5 FL (ref 79–97)
MONOCYTES # BLD AUTO: 0.98 10*3/MM3 (ref 0.1–0.9)
MONOCYTES NFR BLD AUTO: 9.9 % (ref 5–12)
NEUTROPHILS NFR BLD AUTO: 6.61 10*3/MM3 (ref 1.7–7)
NEUTROPHILS NFR BLD AUTO: 66.5 % (ref 42.7–76)
NRBC BLD AUTO-RTO: 0 /100 WBC (ref 0–0.2)
PLATELET # BLD AUTO: 427 10*3/MM3 (ref 140–450)
PMV BLD AUTO: 9.9 FL (ref 6–12)
POTASSIUM SERPL-SCNC: 3.6 MMOL/L (ref 3.5–4.7)
PROT SERPL-MCNC: 6.9 G/DL (ref 6.3–8)
RBC # BLD AUTO: 4.1 10*6/MM3 (ref 3.77–5.28)
SODIUM SERPL-SCNC: 139 MMOL/L (ref 134–145)
WBC NRBC COR # BLD: 9.94 10*3/MM3 (ref 3.4–10.8)

## 2023-04-28 PROCEDURE — 80053 COMPREHEN METABOLIC PANEL: CPT

## 2023-04-28 PROCEDURE — 36415 COLL VENOUS BLD VENIPUNCTURE: CPT

## 2023-04-28 PROCEDURE — 25010000002 BORTEZOMIB PER 0.1 MG: Performed by: NURSE PRACTITIONER

## 2023-04-28 PROCEDURE — 25010000002 DARATUMUMAB-HYALURONIDASE-FIHJ 1800-30000 MG-UT/15ML SOLUTION: Performed by: NURSE PRACTITIONER

## 2023-04-28 PROCEDURE — 85025 COMPLETE CBC W/AUTO DIFF WBC: CPT

## 2023-04-28 PROCEDURE — 96401 CHEMO ANTI-NEOPL SQ/IM: CPT

## 2023-04-28 PROCEDURE — 63710000001 DIPHENHYDRAMINE PER 50 MG: Performed by: NURSE PRACTITIONER

## 2023-04-28 RX ORDER — FAMOTIDINE 20 MG/1
20 TABLET, FILM COATED ORAL ONCE
Status: CANCELLED
Start: 2023-04-28 | End: 2023-04-28

## 2023-04-28 RX ORDER — BORTEZOMIB 3.5 MG/1
1.3 INJECTION, POWDER, LYOPHILIZED, FOR SOLUTION INTRAVENOUS; SUBCUTANEOUS ONCE
Status: COMPLETED | OUTPATIENT
Start: 2023-04-28 | End: 2023-04-28

## 2023-04-28 RX ORDER — ACETAMINOPHEN 500 MG
1000 TABLET ORAL ONCE
Status: COMPLETED | OUTPATIENT
Start: 2023-04-28 | End: 2023-04-28

## 2023-04-28 RX ORDER — DIPHENHYDRAMINE HCL 25 MG
25 CAPSULE ORAL ONCE
Status: CANCELLED | OUTPATIENT
Start: 2023-04-28

## 2023-04-28 RX ORDER — BORTEZOMIB 3.5 MG/1
1.3 INJECTION, POWDER, LYOPHILIZED, FOR SOLUTION INTRAVENOUS; SUBCUTANEOUS ONCE
Status: CANCELLED | OUTPATIENT
Start: 2023-04-28

## 2023-04-28 RX ORDER — FAMOTIDINE 10 MG/ML
20 INJECTION, SOLUTION INTRAVENOUS AS NEEDED
Status: CANCELLED | OUTPATIENT
Start: 2023-04-28

## 2023-04-28 RX ORDER — ACETAMINOPHEN 500 MG
1000 TABLET ORAL ONCE
Status: CANCELLED | OUTPATIENT
Start: 2023-04-28

## 2023-04-28 RX ORDER — LOSARTAN POTASSIUM 50 MG/1
TABLET ORAL
COMMUNITY
Start: 2023-04-25

## 2023-04-28 RX ORDER — DIPHENHYDRAMINE HYDROCHLORIDE 50 MG/ML
50 INJECTION INTRAMUSCULAR; INTRAVENOUS AS NEEDED
Status: CANCELLED | OUTPATIENT
Start: 2023-04-28

## 2023-04-28 RX ORDER — FAMOTIDINE 20 MG/1
20 TABLET, FILM COATED ORAL ONCE
Status: COMPLETED | OUTPATIENT
Start: 2023-04-28 | End: 2023-04-28

## 2023-04-28 RX ORDER — DIPHENHYDRAMINE HCL 25 MG
25 CAPSULE ORAL ONCE
Status: COMPLETED | OUTPATIENT
Start: 2023-04-28 | End: 2023-04-28

## 2023-04-28 RX ADMIN — BORTEZOMIB 2.5 MG: 3.5 INJECTION, POWDER, LYOPHILIZED, FOR SOLUTION INTRAVENOUS; SUBCUTANEOUS at 14:47

## 2023-04-28 RX ADMIN — ACETAMINOPHEN 1000 MG: 500 TABLET, FILM COATED ORAL at 14:16

## 2023-04-28 RX ADMIN — FAMOTIDINE 20 MG: 20 TABLET, FILM COATED ORAL at 14:16

## 2023-04-28 RX ADMIN — DARATUMUMAB AND HYALURONIDASE-FIHJ (HUMAN RECOMBINANT) 1800 MG: 1800; 30000 INJECTION SUBCUTANEOUS at 14:47

## 2023-04-28 RX ADMIN — DIPHENHYDRAMINE HYDROCHLORIDE 25 MG: 25 CAPSULE ORAL at 14:16

## 2023-05-05 ENCOUNTER — LAB (OUTPATIENT)
Dept: LAB | Facility: HOSPITAL | Age: 53
End: 2023-05-05
Payer: COMMERCIAL

## 2023-05-05 ENCOUNTER — INFUSION (OUTPATIENT)
Dept: ONCOLOGY | Facility: HOSPITAL | Age: 53
End: 2023-05-05
Payer: COMMERCIAL

## 2023-05-05 VITALS
OXYGEN SATURATION: 98 % | WEIGHT: 196 LBS | RESPIRATION RATE: 16 BRPM | TEMPERATURE: 98 F | SYSTOLIC BLOOD PRESSURE: 145 MMHG | BODY MASS INDEX: 32.62 KG/M2 | HEART RATE: 68 BPM | DIASTOLIC BLOOD PRESSURE: 76 MMHG

## 2023-05-05 DIAGNOSIS — C90.00 MULTIPLE MYELOMA NOT HAVING ACHIEVED REMISSION: ICD-10-CM

## 2023-05-05 DIAGNOSIS — C90.00 MULTIPLE MYELOMA NOT HAVING ACHIEVED REMISSION: Primary | ICD-10-CM

## 2023-05-05 LAB
ALBUMIN SERPL-MCNC: 4.1 G/DL (ref 3.5–5.2)
ALBUMIN/GLOB SERPL: 1.4 G/DL (ref 1.1–2.4)
ALP SERPL-CCNC: 64 U/L (ref 38–116)
ALT SERPL W P-5'-P-CCNC: 17 U/L (ref 0–33)
ANION GAP SERPL CALCULATED.3IONS-SCNC: 12.9 MMOL/L (ref 5–15)
AST SERPL-CCNC: 15 U/L (ref 0–32)
BASOPHILS # BLD AUTO: 0.03 10*3/MM3 (ref 0–0.2)
BASOPHILS NFR BLD AUTO: 0.3 % (ref 0–1.5)
BILIRUB SERPL-MCNC: 0.3 MG/DL (ref 0.2–1.2)
BUN SERPL-MCNC: 13 MG/DL (ref 6–20)
BUN/CREAT SERPL: 15.9 (ref 7.3–30)
CALCIUM SPEC-SCNC: 9.8 MG/DL (ref 8.5–10.2)
CHLORIDE SERPL-SCNC: 106 MMOL/L (ref 98–107)
CO2 SERPL-SCNC: 23.1 MMOL/L (ref 22–29)
CREAT SERPL-MCNC: 0.82 MG/DL (ref 0.6–1.1)
DEPRECATED RDW RBC AUTO: 51.2 FL (ref 37–54)
EGFRCR SERPLBLD CKD-EPI 2021: 87.3 ML/MIN/1.73
EOSINOPHIL # BLD AUTO: 0.22 10*3/MM3 (ref 0–0.4)
EOSINOPHIL NFR BLD AUTO: 2.5 % (ref 0.3–6.2)
ERYTHROCYTE [DISTWIDTH] IN BLOOD BY AUTOMATED COUNT: 15.5 % (ref 12.3–15.4)
GLOBULIN UR ELPH-MCNC: 2.9 GM/DL (ref 1.8–3.5)
GLUCOSE SERPL-MCNC: 97 MG/DL (ref 74–124)
HCT VFR BLD AUTO: 38.8 % (ref 34–46.6)
HGB BLD-MCNC: 12.3 G/DL (ref 12–15.9)
IMM GRANULOCYTES # BLD AUTO: 0.04 10*3/MM3 (ref 0–0.05)
IMM GRANULOCYTES NFR BLD AUTO: 0.5 % (ref 0–0.5)
LYMPHOCYTES # BLD AUTO: 1.46 10*3/MM3 (ref 0.7–3.1)
LYMPHOCYTES NFR BLD AUTO: 16.8 % (ref 19.6–45.3)
MCH RBC QN AUTO: 28.8 PG (ref 26.6–33)
MCHC RBC AUTO-ENTMCNC: 31.7 G/DL (ref 31.5–35.7)
MCV RBC AUTO: 90.9 FL (ref 79–97)
MONOCYTES # BLD AUTO: 0.99 10*3/MM3 (ref 0.1–0.9)
MONOCYTES NFR BLD AUTO: 11.4 % (ref 5–12)
NEUTROPHILS NFR BLD AUTO: 5.94 10*3/MM3 (ref 1.7–7)
NEUTROPHILS NFR BLD AUTO: 68.5 % (ref 42.7–76)
NRBC BLD AUTO-RTO: 0 /100 WBC (ref 0–0.2)
PLATELET # BLD AUTO: 339 10*3/MM3 (ref 140–450)
PMV BLD AUTO: 10.6 FL (ref 6–12)
POTASSIUM SERPL-SCNC: 4.2 MMOL/L (ref 3.5–4.7)
PROT SERPL-MCNC: 7 G/DL (ref 6.3–8)
RBC # BLD AUTO: 4.27 10*6/MM3 (ref 3.77–5.28)
SODIUM SERPL-SCNC: 142 MMOL/L (ref 134–145)
WBC NRBC COR # BLD: 8.68 10*3/MM3 (ref 3.4–10.8)

## 2023-05-05 PROCEDURE — 80053 COMPREHEN METABOLIC PANEL: CPT

## 2023-05-05 PROCEDURE — 96401 CHEMO ANTI-NEOPL SQ/IM: CPT

## 2023-05-05 PROCEDURE — 36415 COLL VENOUS BLD VENIPUNCTURE: CPT

## 2023-05-05 PROCEDURE — 85025 COMPLETE CBC W/AUTO DIFF WBC: CPT

## 2023-05-05 PROCEDURE — 25010000002 BORTEZOMIB PER 0.1 MG: Performed by: NURSE PRACTITIONER

## 2023-05-05 RX ORDER — DIPHENHYDRAMINE HYDROCHLORIDE 50 MG/ML
50 INJECTION INTRAMUSCULAR; INTRAVENOUS AS NEEDED
Status: CANCELLED | OUTPATIENT
Start: 2023-05-05

## 2023-05-05 RX ORDER — MEPERIDINE HYDROCHLORIDE 25 MG/ML
25 INJECTION INTRAMUSCULAR; INTRAVENOUS; SUBCUTANEOUS
Status: CANCELLED | OUTPATIENT
Start: 2023-05-05

## 2023-05-05 RX ORDER — BORTEZOMIB 3.5 MG/1
1.3 INJECTION, POWDER, LYOPHILIZED, FOR SOLUTION INTRAVENOUS; SUBCUTANEOUS ONCE
Status: COMPLETED | OUTPATIENT
Start: 2023-05-05 | End: 2023-05-05

## 2023-05-05 RX ORDER — FAMOTIDINE 10 MG/ML
20 INJECTION, SOLUTION INTRAVENOUS AS NEEDED
Status: CANCELLED | OUTPATIENT
Start: 2023-05-05

## 2023-05-05 RX ADMIN — BORTEZOMIB 2.5 MG: 3.5 INJECTION, POWDER, LYOPHILIZED, FOR SOLUTION INTRAVENOUS; SUBCUTANEOUS at 15:23

## 2023-05-05 NOTE — PROGRESS NOTES
Pt reports double vision and burning sensation that started yesterday. Notified Dr. Freedman who saw pt at chairside. Per Dr. Freedman the pt is ok to treat today.

## 2023-05-12 ENCOUNTER — INFUSION (OUTPATIENT)
Dept: ONCOLOGY | Facility: HOSPITAL | Age: 53
End: 2023-05-12
Payer: COMMERCIAL

## 2023-05-12 ENCOUNTER — OFFICE VISIT (OUTPATIENT)
Dept: ONCOLOGY | Facility: CLINIC | Age: 53
End: 2023-05-12
Payer: COMMERCIAL

## 2023-05-12 ENCOUNTER — LAB (OUTPATIENT)
Dept: LAB | Facility: HOSPITAL | Age: 53
End: 2023-05-12
Payer: COMMERCIAL

## 2023-05-12 VITALS
HEART RATE: 58 BPM | WEIGHT: 195.8 LBS | OXYGEN SATURATION: 97 % | TEMPERATURE: 98.7 F | BODY MASS INDEX: 32.62 KG/M2 | HEIGHT: 65 IN

## 2023-05-12 DIAGNOSIS — C90.00 MULTIPLE MYELOMA NOT HAVING ACHIEVED REMISSION: Primary | ICD-10-CM

## 2023-05-12 DIAGNOSIS — C90.00 MULTIPLE MYELOMA NOT HAVING ACHIEVED REMISSION: ICD-10-CM

## 2023-05-12 LAB
BASOPHILS # BLD AUTO: 0.05 10*3/MM3 (ref 0–0.2)
BASOPHILS NFR BLD AUTO: 0.6 % (ref 0–1.5)
DEPRECATED RDW RBC AUTO: 49.9 FL (ref 37–54)
EOSINOPHIL # BLD AUTO: 0.17 10*3/MM3 (ref 0–0.4)
EOSINOPHIL NFR BLD AUTO: 1.9 % (ref 0.3–6.2)
ERYTHROCYTE [DISTWIDTH] IN BLOOD BY AUTOMATED COUNT: 15.3 % (ref 12.3–15.4)
HCT VFR BLD AUTO: 37.8 % (ref 34–46.6)
HGB BLD-MCNC: 12.3 G/DL (ref 12–15.9)
IMM GRANULOCYTES # BLD AUTO: 0.1 10*3/MM3 (ref 0–0.05)
IMM GRANULOCYTES NFR BLD AUTO: 1.1 % (ref 0–0.5)
LYMPHOCYTES # BLD AUTO: 2.35 10*3/MM3 (ref 0.7–3.1)
LYMPHOCYTES NFR BLD AUTO: 26.9 % (ref 19.6–45.3)
MCH RBC QN AUTO: 29.1 PG (ref 26.6–33)
MCHC RBC AUTO-ENTMCNC: 32.5 G/DL (ref 31.5–35.7)
MCV RBC AUTO: 89.4 FL (ref 79–97)
MONOCYTES # BLD AUTO: 1.54 10*3/MM3 (ref 0.1–0.9)
MONOCYTES NFR BLD AUTO: 17.6 % (ref 5–12)
NEUTROPHILS NFR BLD AUTO: 4.54 10*3/MM3 (ref 1.7–7)
NEUTROPHILS NFR BLD AUTO: 51.9 % (ref 42.7–76)
NRBC BLD AUTO-RTO: 0 /100 WBC (ref 0–0.2)
PLATELET # BLD AUTO: 276 10*3/MM3 (ref 140–450)
PMV BLD AUTO: 11.8 FL (ref 6–12)
RBC # BLD AUTO: 4.23 10*6/MM3 (ref 3.77–5.28)
WBC NRBC COR # BLD: 8.75 10*3/MM3 (ref 3.4–10.8)

## 2023-05-12 PROCEDURE — 85025 COMPLETE CBC W/AUTO DIFF WBC: CPT

## 2023-05-12 PROCEDURE — 25010000002 DARATUMUMAB-HYALURONIDASE-FIHJ 1800-30000 MG-UT/15ML SOLUTION: Performed by: INTERNAL MEDICINE

## 2023-05-12 PROCEDURE — 96401 CHEMO ANTI-NEOPL SQ/IM: CPT

## 2023-05-12 PROCEDURE — 63710000001 DIPHENHYDRAMINE PER 50 MG: Performed by: INTERNAL MEDICINE

## 2023-05-12 PROCEDURE — 36415 COLL VENOUS BLD VENIPUNCTURE: CPT

## 2023-05-12 RX ORDER — MEPERIDINE HYDROCHLORIDE 25 MG/ML
25 INJECTION INTRAMUSCULAR; INTRAVENOUS; SUBCUTANEOUS
Status: CANCELLED | OUTPATIENT
Start: 2023-05-12

## 2023-05-12 RX ORDER — ACETAMINOPHEN 500 MG
1000 TABLET ORAL ONCE
Status: COMPLETED | OUTPATIENT
Start: 2023-05-12 | End: 2023-05-12

## 2023-05-12 RX ORDER — DIPHENHYDRAMINE HYDROCHLORIDE 50 MG/ML
50 INJECTION INTRAMUSCULAR; INTRAVENOUS AS NEEDED
Status: CANCELLED | OUTPATIENT
Start: 2023-05-12

## 2023-05-12 RX ORDER — DIPHENHYDRAMINE HCL 25 MG
25 CAPSULE ORAL ONCE
Status: CANCELLED | OUTPATIENT
Start: 2023-05-12

## 2023-05-12 RX ORDER — ACETAMINOPHEN 500 MG
1000 TABLET ORAL ONCE
Status: CANCELLED | OUTPATIENT
Start: 2023-05-12

## 2023-05-12 RX ORDER — FAMOTIDINE 20 MG/1
20 TABLET, FILM COATED ORAL ONCE
Status: COMPLETED | OUTPATIENT
Start: 2023-05-12 | End: 2023-05-12

## 2023-05-12 RX ORDER — FAMOTIDINE 10 MG/ML
20 INJECTION, SOLUTION INTRAVENOUS AS NEEDED
Status: CANCELLED | OUTPATIENT
Start: 2023-05-12

## 2023-05-12 RX ORDER — FAMOTIDINE 20 MG/1
20 TABLET, FILM COATED ORAL ONCE
Status: CANCELLED
Start: 2023-05-12 | End: 2023-05-12

## 2023-05-12 RX ORDER — DEXAMETHASONE 4 MG/1
20 TABLET ORAL WEEKLY
Qty: 20 TABLET | Refills: 8 | Status: SHIPPED | OUTPATIENT
Start: 2023-05-12

## 2023-05-12 RX ORDER — LOSARTAN POTASSIUM 100 MG/1
TABLET ORAL
COMMUNITY
Start: 2023-05-08

## 2023-05-12 RX ORDER — DIPHENHYDRAMINE HCL 25 MG
25 CAPSULE ORAL ONCE
Status: COMPLETED | OUTPATIENT
Start: 2023-05-12 | End: 2023-05-12

## 2023-05-12 RX ADMIN — ACETAMINOPHEN 1000 MG: 500 TABLET, FILM COATED ORAL at 12:21

## 2023-05-12 RX ADMIN — DARATUMUMAB AND HYALURONIDASE-FIHJ (HUMAN RECOMBINANT) 1800 MG: 1800; 30000 INJECTION SUBCUTANEOUS at 12:55

## 2023-05-12 RX ADMIN — FAMOTIDINE 20 MG: 20 TABLET, FILM COATED ORAL at 12:21

## 2023-05-12 RX ADMIN — DIPHENHYDRAMINE HYDROCHLORIDE 25 MG: 25 CAPSULE ORAL at 12:21

## 2023-05-12 NOTE — PROGRESS NOTES
Subjective        REASONS FOR FOLLOWUP:    1. 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 NO ANEMIA, NORMAL CREATININE,NORMAL CALCIUM, NO BONE PAIN NO ADENOPATHIES , NORMAL DIFFERENTIAL IN WBC.  2. LEUKOCYTOSIS AND THROMBOCYTOSIS.  3. GOITER VERY LIKELY HASHIMOTO'S THYROIDITIS.  4. SJOGREN'S SYNDROME.    HISTORY OF PRESENT ILLNESS:    On 05/12/2023 this patient returns today to the office for follow-up in company of her daughter in order to be reviewed. She has continued her treatment for myeloma using Darzalex, Revlimid, dexamethasone and Velcade. Unfortunately the patient run out of the dexamethasone and she has not been taking it for the last couple of weeks and she continues having significant issues in her legs that suggests to me on further questioning that she has neuropathic pain associated with Velcade. Besides this actually She is feeling very well. Her dry issues are now controlled with medication. She has very good appetite, proper bowel activity, proper urination. No cardiac or respiratory symptomatology.        Past Medical History:   Diagnosis Date   • Achilles tendonitis    • Anemia    • Anxiety    • Arthritis     ankles, shoulders   • Asthma     childhood   • Benign essential hypertension    • Cerebral aneurysm    • GERD (gastroesophageal reflux disease)    • Headache    • History of anemia    • History of E. coli septicemia    • Multiple myeloma 2023   • Rotator cuff syndrome    • Seasonal allergies         Past Surgical History:   Procedure Laterality Date   • ABSCESS DRAINAGE  2000    Renal abscess   • CEREBRAL ANGIOGRAM  2020   • CRANIOTOMY  2020    with angiogram for aneurysm   • HYSTERECTOMY  2017    partial, both ovaries remain   • ROTATOR CUFF REPAIR Right 2012   • SHOULDER SURGERY          Current Outpatient Medications on File Prior to Visit   Medication Sig Dispense Refill   • acyclovir (ZOVIRAX) 400 MG tablet Take 1 tablet by mouth 2 (Two) Times a Day. 60  tablet 3   • albuterol sulfate  (90 Base) MCG/ACT inhaler Ventolin HFA 90 mcg/actuation aerosol inhaler     • clindamycin (Clindagel) 1 % gel Apply 1 application topically to the appropriate area as directed 2 (Two) Times a Day. 30 g 0   • Emgality 120 MG/ML auto-injector pen INJECT 1 ML SUBCUTANEOUSLY ONCE EVERY MONTH     • lenalidomide (Revlimid) 15 MG capsule TAKE 1 CAPSULE BY MOUTH ONCE DAILY FOR 21 DAYS ON AND 7 DAYS OFF 21 capsule 0   • levothyroxine (SYNTHROID, LEVOTHROID) 25 MCG tablet      • losartan (COZAAR) 100 MG tablet TAKE 1 TABLET BY MOUTH ONCE DAILY WITH MEALS FOR 30 DAYS     • metoprolol tartrate (LOPRESSOR) 50 MG tablet Take 1 tablet by mouth Every Morning.     • OLANZapine (ZyPREXA) 2.5 MG tablet Take 1 tablet by mouth Every Night. (Patient taking differently: Take 1 tablet by mouth Every Night. Taking PRN) 30 tablet 3   • ondansetron (ZOFRAN) 8 MG tablet Take 1 tablet by mouth 3 (Three) Times a Day As Needed for Nausea or Vomiting. (Patient taking differently: Take 1 tablet by mouth 3 (Three) Times a Day As Needed for Nausea or Vomiting. Taking PRN) 30 tablet 5   • Rimegepant Sulfate (NURTEC) 75 MG tablet dispersible tablet Take 1 tablet by mouth Daily As Needed.     • Rivaroxaban (Xarelto) 2.5 MG tablet Take 1 tablet by mouth Daily. 30 tablet 2   • [DISCONTINUED] dexamethasone (DECADRON) 4 MG tablet Take 5 tablets by mouth 1 (One) Time Per Week. Take in the morning with food. 20 tablet 3   • [DISCONTINUED] sulfamethoxazole-trimethoprim (BACTRIM DS,SEPTRA DS) 800-160 MG per tablet Take 1 tablet by mouth 3 (Three) Times a Week. Take 1 tablet on Mondays, Wednesdays and Fridays. 12 tablet 3   • [DISCONTINUED] losartan (COZAAR) 50 MG tablet TAKE 1 TABLET BY MOUTH ONCE DAILY WITH MEALS FOR 30 DAYS (Patient not taking: Reported on 5/12/2023)       No current facility-administered medications on file prior to visit.        ALLERGIES:    Allergies   Allergen Reactions   • Coconut Shortness Of  "Breath   • Eggs Or Egg-Derived Products Diarrhea        Social History     Socioeconomic History   • Marital status:    • Number of children: 3   Tobacco Use   • Smoking status: Never   • Smokeless tobacco: Never   Substance and Sexual Activity   • Alcohol use: Yes   • Drug use: Never        Family History   Problem Relation Age of Onset   • Hypertension Father    • Multiple sclerosis Brother    • Diabetes Brother    • Colon cancer Paternal Aunt    • Heart attack Maternal Grandmother    • Heart disease Maternal Grandmother    • Pancreatic cancer Maternal Grandfather    • Colon cancer Paternal Grandmother    • Heart attack Paternal Grandfather    • Cancer Paternal Grandfather       Objective     Vitals:    05/12/23 1119   BP: Comment: might need to take after she has had time to rest   Pulse: 58   Temp: 98.7 °F (37.1 °C)   TempSrc: Temporal   SpO2: 97%   Weight: 88.8 kg (195 lb 12.8 oz)   Height: 165.1 cm (65\")   PainSc:   9   PainLoc: Leg         5/12/2023    11:17 AM   Current Status   ECOG score 0       Exam :           GENERAL:  Well-developed, Patient  in no acute distress.   SKIN:  Warm, dry ,NO purpura ,no rash.  HEENT:  Pupils were equal and reactive to light and accomodation, conjunctivae noninjected,  normal visual acuity.   NECK:  Supple with good range of motion; no thyromegaly , no JVD or bruits,.No carotid artery pain, no carotid abnormal pulsation   LYMPHATICS:  No cervical, NO supraclavicular, NO axillary, NO inguinal adenopathies.  CARDIAC   normal rate , regular rhythm, without murmur,NO rubs NO S3 NO S4   LUNGS: normal breath sounds bilateral, no wheezing, NO rhonchi, NO crackles ,NO rubs.  VASCULAR VENOUS: no cyanosis, NO collateral circulation, NO varicosities, NO edema, NO palpable cords, NO pain,NO erythema, NO pigmentation of the skin.  ABDOMEN:  Soft, NO pain,no hepatomegaly, no splenomegaly,no masses, no ascites, no collateral circulation,no distention.  EXTREMITIES  AND SPINE:  No " clubbing, no cyanosis ,no deformities , no pain .No kyphosis,  no pain in spine, no pain in ribs , no pain in pelvic bone.  NEUROLOGICAL:  Patient was awake, alert, oriented to time, person and place.Neuropathic pain in both lower extremities.              RECENT LABS:  Hematology WBC   Date Value Ref Range Status   05/12/2023 8.75 3.40 - 10.80 10*3/mm3 Final   12/14/2021 12.38 (H) 4.5 - 11.0 10*3/uL Final     RBC   Date Value Ref Range Status   05/12/2023 4.23 3.77 - 5.28 10*6/mm3 Final   12/14/2021 4.24 4.0 - 5.2 10*6/uL Final     Hemoglobin   Date Value Ref Range Status   05/12/2023 12.3 12.0 - 15.9 g/dL Final   12/14/2021 12.2 12.0 - 16.0 g/dL Final     Hematocrit   Date Value Ref Range Status   05/12/2023 37.8 34.0 - 46.6 % Final   12/14/2021 37.3 36.0 - 46.0 % Final     Platelets   Date Value Ref Range Status   05/12/2023 276 140 - 450 10*3/mm3 Final   12/14/2021 495 (H) 140 - 440 10*3/uL Final       CBC:    WBC   Date Value Ref Range Status   05/12/2023 8.75 3.40 - 10.80 10*3/mm3 Final   12/14/2021 12.38 (H) 4.5 - 11.0 10*3/uL Final     RBC   Date Value Ref Range Status   05/12/2023 4.23 3.77 - 5.28 10*6/mm3 Final   12/14/2021 4.24 4.0 - 5.2 10*6/uL Final     Hemoglobin   Date Value Ref Range Status   05/12/2023 12.3 12.0 - 15.9 g/dL Final   12/14/2021 12.2 12.0 - 16.0 g/dL Final     Hematocrit   Date Value Ref Range Status   05/12/2023 37.8 34.0 - 46.6 % Final   12/14/2021 37.3 36.0 - 46.0 % Final     MCV   Date Value Ref Range Status   05/12/2023 89.4 79.0 - 97.0 fL Final   12/14/2021 88.0 80.0 - 100.0 fL Final     MCH   Date Value Ref Range Status   05/12/2023 29.1 26.6 - 33.0 pg Final   12/14/2021 28.8 26.0 - 34.0 pg Final     MCHC   Date Value Ref Range Status   05/12/2023 32.5 31.5 - 35.7 g/dL Final   12/14/2021 32.7 31.0 - 37.0 g/dL Final     RDW   Date Value Ref Range Status   05/12/2023 15.3 12.3 - 15.4 % Final   12/14/2021 13.9 12.0 - 16.8 % Final     RDW-SD   Date Value Ref Range Status    05/12/2023 49.9 37.0 - 54.0 fl Final     MPV   Date Value Ref Range Status   05/12/2023 11.8 6.0 - 12.0 fL Final   12/14/2021 9.5 8.4 - 12.4 fL Final     Platelets   Date Value Ref Range Status   05/12/2023 276 140 - 450 10*3/mm3 Final   12/14/2021 495 (H) 140 - 440 10*3/uL Final     Neutrophil Rel %   Date Value Ref Range Status   12/14/2021 63.8 45 - 80 % Final     Neutrophil %   Date Value Ref Range Status   05/12/2023 51.9 42.7 - 76.0 % Final     Lymphocyte Rel %   Date Value Ref Range Status   12/14/2021 24.4 15 - 50 % Final     Lymphocyte %   Date Value Ref Range Status   05/12/2023 26.9 19.6 - 45.3 % Final     Monocyte Rel %   Date Value Ref Range Status   12/14/2021 9.0 0 - 15 % Final     Monocyte %   Date Value Ref Range Status   05/12/2023 17.6 (H) 5.0 - 12.0 % Final     Eosinophil %   Date Value Ref Range Status   05/12/2023 1.9 0.3 - 6.2 % Final   12/14/2021 1.4 0 - 7 % Final     Basophil Rel %   Date Value Ref Range Status   12/14/2021 0.6 0 - 2 % Final     Basophil %   Date Value Ref Range Status   05/12/2023 0.6 0.0 - 1.5 % Final     Immature Grans %   Date Value Ref Range Status   05/12/2023 1.1 (H) 0.0 - 0.5 % Final   12/14/2021 0.8 0.0 - 1.0 % Final     Neutrophils Absolute   Date Value Ref Range Status   12/14/2021 7.89 2.0 - 8.8 10*3/uL Final     Neutrophils, Absolute   Date Value Ref Range Status   05/12/2023 4.54 1.70 - 7.00 10*3/mm3 Final     Lymphocytes Absolute   Date Value Ref Range Status   12/14/2021 3.02 0.7 - 5.5 10*3/uL Final     Lymphocytes, Absolute   Date Value Ref Range Status   05/12/2023 2.35 0.70 - 3.10 10*3/mm3 Final     Monocytes Absolute   Date Value Ref Range Status   12/14/2021 1.12 0.0 - 1.7 10*3/uL Final     Monocytes, Absolute   Date Value Ref Range Status   05/12/2023 1.54 (H) 0.10 - 0.90 10*3/mm3 Final     Eosinophils Absolute   Date Value Ref Range Status   12/14/2021 0.17 0.0 - 0.8 10*3/uL Final     Eosinophils, Absolute   Date Value Ref Range Status   05/12/2023  0.17 0.00 - 0.40 10*3/mm3 Final     Basophils Absolute   Date Value Ref Range Status   12/14/2021 0.08 0.0 - 0.2 10*3/uL Final     Basophils, Absolute   Date Value Ref Range Status   05/12/2023 0.05 0.00 - 0.20 10*3/mm3 Final     Immature Grans, Absolute   Date Value Ref Range Status   05/12/2023 0.10 (H) 0.00 - 0.05 10*3/mm3 Final   12/14/2021 0.10 0.00 - 0.10 10*3/uL Final     nRBC   Date Value Ref Range Status   05/12/2023 0.0 0.0 - 0.2 /100 WBC Final        CMP:    Glucose   Date Value Ref Range Status   05/05/2023 97 74 - 124 mg/dL Final     BUN   Date Value Ref Range Status   05/05/2023 13 6 - 20 mg/dL Final   08/07/2020 5 (L) 7 - 20 mg/dL Final     Creatinine   Date Value Ref Range Status   05/05/2023 0.82 0.60 - 1.10 mg/dL Final   08/07/2020 0.6 (L) 0.7 - 1.5 mg/dL Final     Sodium   Date Value Ref Range Status   05/05/2023 142 134 - 145 mmol/L Final   08/07/2020 137 137 - 145 mmol/L Final     Potassium   Date Value Ref Range Status   05/05/2023 4.2 3.5 - 4.7 mmol/L Final   08/07/2020 3.5 3.5 - 5.1 mmol/L Final     Chloride   Date Value Ref Range Status   05/05/2023 106 98 - 107 mmol/L Final   08/07/2020 106 98 - 107 mmol/L Final     CO2   Date Value Ref Range Status   05/05/2023 23.1 22.0 - 29.0 mmol/L Final     Total CO2   Date Value Ref Range Status   08/07/2020 21 (L) 22 - 30 mmol/L Final     Calcium   Date Value Ref Range Status   05/05/2023 9.8 8.5 - 10.2 mg/dL Final   08/07/2020 8.4 8.4 - 10.2 mg/dL Final     Total Protein   Date Value Ref Range Status   05/05/2023 7.0 6.3 - 8.0 g/dL Final   08/05/2020 7.5 6.3 - 8.2 g/dL Final     Albumin   Date Value Ref Range Status   05/05/2023 4.1 3.5 - 5.2 g/dL Final   08/05/2020 4.1 3.5 - 5.0 g/dL Final     ALT (SGPT)   Date Value Ref Range Status   05/05/2023 17 0 - 33 U/L Final   08/05/2020 25 0 - 35 U/L Final     Comment:     If ALT testing ordered prior to 2359 on 11/16/19, please use reference range: Male 0-50, Female 0-35.  [facility]  switched to a new ALT assay on 11/16/19 which yields results 10 U/L lower than the old assay.     AST (SGOT)   Date Value Ref Range Status   05/05/2023 15 0 - 32 U/L Final   08/05/2020 39 15 - 46 U/L Final     Alkaline Phosphatase   Date Value Ref Range Status   05/05/2023 64 38 - 116 U/L Final   08/05/2020 65 38 - 126 U/L Final     Total Bilirubin   Date Value Ref Range Status   05/05/2023 0.3 0.2 - 1.2 mg/dL Final   08/05/2020 0.3 0.2 - 1.3 mg/dL Final     Globulin   Date Value Ref Range Status   05/05/2023 2.9 1.8 - 3.5 gm/dL Final   08/05/2020 3.4 1.5 - 4.5 g/dL Final     A/G Ratio   Date Value Ref Range Status   05/05/2023 1.4 1.1 - 2.4 g/dL Final     BUN/Creatinine Ratio   Date Value Ref Range Status   05/05/2023 15.9 7.3 - 30.0 Final   08/07/2020 8.6 RATIO Final     Anion Gap   Date Value Ref Range Status   05/05/2023 12.9 5.0 - 15.0 mmol/L Final           Assessment & Plan     Diagnoses and all orders for this visit:    1. Multiple myeloma not having achieved remission (Primary)  -     CBC & Differential; Future  -     Comprehensive Metabolic Panel; Future  -     CBC & Differential; Future  -     Comprehensive Metabolic Panel; Future  -     CBC & Differential; Future  -     Comprehensive Metabolic Panel; Future  -     SUNNY,PE and FLC, Serum  -     Protein Electrophoresis, Total; Future  -     Ambulatory Referral to Hematology / Oncology  -     dexamethasone (DECADRON) 4 MG tablet; Take 5 tablets by mouth 1 (One) Time Per Week. Take in the morning with food.  Dispense: 20 tablet; Refill: 8  -     Cancel: famotidine (PEPCID) tablet 20 mg  -     Cancel: acetaminophen (TYLENOL) tablet 1,000 mg  -     Cancel: diphenhydrAMINE (BENADRYL) capsule 25 mg  -     Cancel: daratumumab-hyaluronidase-fihj (DARZALEX FASPRO) [id number] MG-UT/15ML injection 1,800 mg    Other orders  -     Cancel: Hydrocortisone Sod Suc (PF) (Solu-CORTEF) injection 100 mg  -     Cancel: diphenhydrAMINE (BENADRYL) injection 50 mg  -     Cancel:  famotidine (PEPCID) injection 20 mg  -     Cancel: meperidine (DEMEROL) injection 25 mg       1.  Multiple Myeloma:    · Referred 11/14/2022 for leukocytosis and thrombocytosis by rheumatology, Dr. Ayala.  Diagnosed with Sjogren's by Dr. Ayala.  · History of anemia when she had a cerebral aneurysm clip done in 2019.  · Patient found to have serum protein electrophoresis that disclosed a monoclonal protein   · IgG monoclonal protein in the 2000 category, Bence-Moon protein in the urine, very small amount that is not quantifiable.  · Recommend patient proceed with bone marrow testing.  · 1/24/2023 bone marrow biopsy  · Bone marrow biopsy showing 18% plasma cells infiltrating in the bone marrow. Also absence of iron stores. The patient had no evidence of myelofibrosis, lymphoma, leukemia, or myelodysplasia.   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 Congo red stain was negative for amyloid deposition  · Recommended treatment with combination of Darzalex once a week, Velcade subcutaneously once a week, 3 weeks out of 4, Revlimid 15 mg a day for 3 weeks out of a month and dexamethasone 20 mg once a week.  · Plan to treat the patient for the next 4 months following her monoclonal protein and eventually referred to [facility] for consideration of high-dose chemotherapy and stem cell transplant.  · No evidence of lytic lesions, therefore will not use Zometa or Xgeva at this time.  · 2/3/2023 cycle 1 Darzalex Faspro and Velcade.  · 02/10/2023 the patient was further reviewed the day that she comes for her 2nd Darzalex infusion and 2nd Velcade injection. So far the patient has not encountered any side effects of the medicines.  · Seen 2/24/2023 due for cycle 1 day 22.  Patient started her Revlimid 15 mg daily for 3 out of 4 weeks last Wednesday.  So far she is tolerating all of her treatment fairly well without any significant side effects other than ongoing  fatigue.  · 3/10/2023 here for cycle 2, day 8 Velcade, Darzalex fast pro, continuing on Revlimid 15 mg daily for 3 weeks on, 1 week off as well as dexamethasone 20 mg weekly.  We will recheck paraprotein studies today.  On 03/17/2023,  She has had resolution of pain in her lower extremities.  Her monoclonal protein has dropped by half in only 1 month of therapy.  She questions referral to BMT, which will be made to Dr. Plasencia in May or June.  3/31/2023: C3D1 Darzalex and Velcade.  Continues Revlimid at home, currently on her second week on Revlimid.  Continues dexamethasone weekly.  Tolerating well.  4/7/2023 seen today for cycle 3, day 8.  She will receive Velcade only today (Darzalex which is every other week).  She will continue her Revlimid 15 mg daily for 21 out of 28 days, as well as dexamethasone 20 mg weekly.  Repeat paraprotein studies pending for today.  4/14/2023 due for cycle 3, day 15 Velcade continuing to complain of leg pain.  We reviewed M spike improved to 0.5,Free light chain kappa 22.2, kappa/lambda ratio 1.8.   4/21/2023 patient returns for cycle 3-day 22 for follow-up of her leg pain.  X-rays performed 4/19/2023 show bilateral knee degeneration however otherwise unremarkable.  Patient did see rheumatology this week and they made multiple medication changes suggestions for her primary care and have asked the patient to make an appointment with them.  We will go ahead and place a referral for cardiology evaluation as recommended by rheumatology.  Case discussed with Dr. Freedman today and we will proceed with bilateral lower extremity venous Doppler prior to her next visit.   Additional labs obtained including B12, folate, CK all unremarkable.  4/28/2023: Proceed with cycle 4-day 1 Darzalex and Velcade today.  Bilateral Doppler negative for DVT.  Consult with cardiology scheduled 5/12/2023.  Continues with swelling/tightness to the bilateral lower extremities.  Saw her PCP who discontinued  amlodipine and started losartan.  Scheduled to see cardiology 5/12/2023.  She does feel the swelling/tightness is stable.  On 05/12/2023 the patient has come in for further review. In regard to her myeloma the symptoms pertinent to this are very minimal. Actually the only symptom that she has is very likely side effect of Velcade with neuropathic symptomatology in both lower extremities. We have performed CPK evaluation that was negative normal. We have done evaluation for B12 level that has been normal and the patient continues having symptoms that again mimic neuropathic in nature. Given this fact we will discontinue her Velcade at this point altogether. I reviewed with her and her daughter in the room the dramatic drop of her monoclonal protein studies since initiation of her therapy. This means that she has had a tremendous improvement with what we are doing and I advised her to continue her Darzalex, continue and resume her dexamethasone 20 mg once a week and continue with her Revlimid for the time being. I think it is time for the patient to be seen at the [facility] Bone Marrow Transplant team in order to assess the status of her myeloma and eventually to consider high-dose chemotherapy and stem cell support.     She will continue returning to the office for her Darzalex injection and she will have a new prescription for her dexamethasone. Revlimid dose will remain the same.    ·   2.  Nodular goiter: she has a normal TSH and a normal T4. I will let Beau Frye MD, the patient's Primary Physician address this issue eventually with an ultrasound and clinical examination. The patient has antithyroid antibody positivity and very likely in my opinion she probably has a component of Hashimoto's thyroiditis.   On 03/17/2023 her goiter is no different today than what it was during the original consultation. This will be watched in absence of any other intervention.  On 05/12/2023 we know that she has  Hashimoto thyroiditis and her thyroid function has remained stable at this time.    ·   3.  Prophylaxis: recommend she start low-dose Xarelto 2.5 mg p.o. daily after she has had her molar extracted for initiation and before initiation of her Revlimid administration.   Continue taking low-dose Xarelto 2.5 mg a day for prevention of thrombosis associated with Revlimid.  Tolerating well without signs or symptoms of bleeding or DVT.    On 05/12/2023 she has not had any difficulty with her Xarelto. No clinical bleeding. No vein thrombosis.    ·   4.  Iron deficiency:  · given her absence of iron stores in her bone marrow she will require IV iron therapy. This could be accommodated at some point between all the infusions and treatments that she needs to have.  3/30/2023: Hemoglobin  12.3.  4/21/2023 hemoglobin 12.4.  Hemoglobin today 12.0.  On 05/12/2023 hemoglobin is stable. We have remeasured ferritin and has been normal.    ·   5.  Sjogren's syndrome with myopathy:  · Followed by rheumatology  · On 03/17/2023 the patient has minimal symptomatology pertinent to this at this time besides minimal dryness in her eyes and oral mucosas.  On 05/12/2023 continues having pain in the lower extremities. Sounds more neuropathic than anything else. Velcade discontinued at this time.    ·   6.  Skin rash-called 3/23/2023 to report skin rash to the bilateral cheeks, neck, spreading towards her shoulders.  She was advised to hold Bactrim.  She was also sent clindamycin gel which she started on Tuesday.  Skin rash is now only present to the bilateral cheeks and much improved.  She will continue clindamycin gel until resolution.  She will continue to hold Bactrim.  · Skin rash improved continuing on clindamycin gel.  · Resolved.  On 05/12/2023 no further rash. I think the rash was related to Bactrim. This medicine was discontinued. She has not had any further issue since then.    ·   PLAN:     1. The patient will remain on her Darzalex  according to protocol. Eventually she will move to every 2 weeks.   2. We will discontinue the Velcade at this time.  3. I have reviewed her dexamethasone to take 20 mg orally once a week.   4. The patient will remain on her Revlimid as originally scheduled.   5. The patient will remain on Xarelto 2.5 mg p.o. daily.   6. The patient will be referred to the [facility] Bone Marrow Transplant Team in consideration of assessment and future therapy with high-dose chemotherapy and stem cell rescue.     I discussed with the patient the dramatic drop in her monoclonal protein in just 2 months of therapy, that is very encouraging.     I discussed all these facts with the patient and her daughter in the room.      Patient is on a high risk medication requiring close monitoring for toxicity.    Estuardo Freedman MD  05/12/2023

## 2023-05-15 LAB
ALBUMIN SERPL ELPH-MCNC: 3.6 G/DL (ref 2.9–4.4)
ALBUMIN/GLOB SERPL: 1.2 {RATIO} (ref 0.7–1.7)
ALPHA1 GLOB SERPL ELPH-MCNC: 0.2 G/DL (ref 0–0.4)
ALPHA2 GLOB SERPL ELPH-MCNC: 0.9 G/DL (ref 0.4–1)
B-GLOBULIN SERPL ELPH-MCNC: 1.1 G/DL (ref 0.7–1.3)
GAMMA GLOB SERPL ELPH-MCNC: 0.9 G/DL (ref 0.4–1.8)
GLOBULIN SER-MCNC: 3.1 G/DL (ref 2.2–3.9)
IGA SERPL-MCNC: 69 MG/DL (ref 87–352)
IGG SERPL-MCNC: 980 MG/DL (ref 586–1602)
IGM SERPL-MCNC: 52 MG/DL (ref 26–217)
INTERPRETATION SERPL IEP-IMP: ABNORMAL
KAPPA LC FREE SER-MCNC: 21.3 MG/L (ref 3.3–19.4)
KAPPA LC FREE/LAMBDA FREE SER: 1.95 {RATIO} (ref 0.26–1.65)
LABORATORY COMMENT REPORT: ABNORMAL
LAMBDA LC FREE SERPL-MCNC: 10.9 MG/L (ref 5.7–26.3)
M PROTEIN SERPL ELPH-MCNC: 0.3 G/DL
PROT SERPL-MCNC: 6.7 G/DL (ref 6–8.5)

## 2023-05-25 ENCOUNTER — SPECIALTY PHARMACY (OUTPATIENT)
Dept: PHARMACY | Facility: HOSPITAL | Age: 53
End: 2023-05-25
Payer: COMMERCIAL

## 2023-05-25 DIAGNOSIS — C90.00 MULTIPLE MYELOMA NOT HAVING ACHIEVED REMISSION: ICD-10-CM

## 2023-05-25 RX ORDER — LENALIDOMIDE 15 MG/1
CAPSULE ORAL
Qty: 21 CAPSULE | Refills: 0 | Status: SHIPPED | OUTPATIENT
Start: 2023-05-25

## 2023-05-25 NOTE — PROGRESS NOTES
Re: Refills of Oral Specialty Medication - Revlimid (lenalidomide)    • Drug-Drug Interactions: The current medication list was reviewed and there are no relevant drug-drug interactions.  • Medication Allergies: The patient has no relevant allergies as it relates to their oral specialty medication  • Review of Labs/Dose Adjustments: NO DOSE CHANGE - I reviewed the most recent note and labs and the patient will continue without any dose changes.  I sent refills as described below.    Drug: Revlimid (lenalidomide)  Strength: 15 mg  Directions: Take one capsule by mouth daily for 21 days on then 7 days off  Quantity: 21  Refills: 0  Pharmacy prescription sent to: [facility]    Name/Credentials: Mairanna Mays, TristanD, BCPS    5/25/2023  12:09 EDT       Completed independent double check on medication order/RX. Dinorah Hood, Ruth, BCOP

## 2023-05-26 ENCOUNTER — OFFICE VISIT (OUTPATIENT)
Dept: ONCOLOGY | Facility: CLINIC | Age: 53
End: 2023-05-26
Payer: COMMERCIAL

## 2023-05-26 ENCOUNTER — INFUSION (OUTPATIENT)
Dept: ONCOLOGY | Facility: HOSPITAL | Age: 53
End: 2023-05-26
Payer: COMMERCIAL

## 2023-05-26 ENCOUNTER — APPOINTMENT (OUTPATIENT)
Dept: ONCOLOGY | Facility: HOSPITAL | Age: 53
End: 2023-05-26
Payer: COMMERCIAL

## 2023-05-26 ENCOUNTER — LAB (OUTPATIENT)
Dept: LAB | Facility: HOSPITAL | Age: 53
End: 2023-05-26
Payer: COMMERCIAL

## 2023-05-26 VITALS
BODY MASS INDEX: 32.86 KG/M2 | SYSTOLIC BLOOD PRESSURE: 123 MMHG | RESPIRATION RATE: 16 BRPM | TEMPERATURE: 98 F | DIASTOLIC BLOOD PRESSURE: 85 MMHG | HEIGHT: 65 IN | OXYGEN SATURATION: 97 % | HEART RATE: 62 BPM | WEIGHT: 197.2 LBS

## 2023-05-26 DIAGNOSIS — M79.605 PAIN IN BOTH LOWER EXTREMITIES: ICD-10-CM

## 2023-05-26 DIAGNOSIS — C90.00 MULTIPLE MYELOMA NOT HAVING ACHIEVED REMISSION: ICD-10-CM

## 2023-05-26 DIAGNOSIS — Z79.899 HIGH RISK MEDICATION USE: ICD-10-CM

## 2023-05-26 DIAGNOSIS — C90.00 MULTIPLE MYELOMA NOT HAVING ACHIEVED REMISSION: Primary | ICD-10-CM

## 2023-05-26 DIAGNOSIS — M79.604 PAIN IN BOTH LOWER EXTREMITIES: ICD-10-CM

## 2023-05-26 LAB
ALBUMIN SERPL-MCNC: 4.2 G/DL (ref 3.5–5.2)
ALBUMIN/GLOB SERPL: 1.4 G/DL (ref 1.1–2.4)
ALP SERPL-CCNC: 71 U/L (ref 38–116)
ALT SERPL W P-5'-P-CCNC: 15 U/L (ref 0–33)
ANION GAP SERPL CALCULATED.3IONS-SCNC: 11.8 MMOL/L (ref 5–15)
AST SERPL-CCNC: 20 U/L (ref 0–32)
BASOPHILS # BLD AUTO: 0.06 10*3/MM3 (ref 0–0.2)
BASOPHILS NFR BLD AUTO: 0.9 % (ref 0–1.5)
BILIRUB SERPL-MCNC: 0.2 MG/DL (ref 0.2–1.2)
BUN SERPL-MCNC: 10 MG/DL (ref 6–20)
BUN/CREAT SERPL: 13 (ref 7.3–30)
CALCIUM SPEC-SCNC: 9.4 MG/DL (ref 8.5–10.2)
CHLORIDE SERPL-SCNC: 101 MMOL/L (ref 98–107)
CO2 SERPL-SCNC: 24.2 MMOL/L (ref 22–29)
CREAT SERPL-MCNC: 0.77 MG/DL (ref 0.6–1.1)
DEPRECATED RDW RBC AUTO: 48.3 FL (ref 37–54)
EGFRCR SERPLBLD CKD-EPI 2021: 94.1 ML/MIN/1.73
EOSINOPHIL # BLD AUTO: 0.18 10*3/MM3 (ref 0–0.4)
EOSINOPHIL NFR BLD AUTO: 2.7 % (ref 0.3–6.2)
ERYTHROCYTE [DISTWIDTH] IN BLOOD BY AUTOMATED COUNT: 14.6 % (ref 12.3–15.4)
GLOBULIN UR ELPH-MCNC: 2.9 GM/DL (ref 1.8–3.5)
GLUCOSE SERPL-MCNC: 90 MG/DL (ref 74–124)
HCT VFR BLD AUTO: 37.2 % (ref 34–46.6)
HGB BLD-MCNC: 11.9 G/DL (ref 12–15.9)
IMM GRANULOCYTES # BLD AUTO: 0.03 10*3/MM3 (ref 0–0.05)
IMM GRANULOCYTES NFR BLD AUTO: 0.4 % (ref 0–0.5)
LYMPHOCYTES # BLD AUTO: 1.61 10*3/MM3 (ref 0.7–3.1)
LYMPHOCYTES NFR BLD AUTO: 23.8 % (ref 19.6–45.3)
MCH RBC QN AUTO: 28.7 PG (ref 26.6–33)
MCHC RBC AUTO-ENTMCNC: 32 G/DL (ref 31.5–35.7)
MCV RBC AUTO: 89.9 FL (ref 79–97)
MONOCYTES # BLD AUTO: 0.65 10*3/MM3 (ref 0.1–0.9)
MONOCYTES NFR BLD AUTO: 9.6 % (ref 5–12)
NEUTROPHILS NFR BLD AUTO: 4.24 10*3/MM3 (ref 1.7–7)
NEUTROPHILS NFR BLD AUTO: 62.6 % (ref 42.7–76)
NRBC BLD AUTO-RTO: 0 /100 WBC (ref 0–0.2)
PLATELET # BLD AUTO: 429 10*3/MM3 (ref 140–450)
PMV BLD AUTO: 10.1 FL (ref 6–12)
POTASSIUM SERPL-SCNC: 4.4 MMOL/L (ref 3.5–4.7)
PROT SERPL-MCNC: 7.1 G/DL (ref 6.3–8)
RBC # BLD AUTO: 4.14 10*6/MM3 (ref 3.77–5.28)
SODIUM SERPL-SCNC: 137 MMOL/L (ref 134–145)
WBC NRBC COR # BLD: 6.77 10*3/MM3 (ref 3.4–10.8)

## 2023-05-26 PROCEDURE — 63710000001 DIPHENHYDRAMINE PER 50 MG: Performed by: NURSE PRACTITIONER

## 2023-05-26 PROCEDURE — 25010000002 DARATUMUMAB-HYALURONIDASE-FIHJ 1800-30000 MG-UT/15ML SOLUTION: Performed by: NURSE PRACTITIONER

## 2023-05-26 PROCEDURE — 85025 COMPLETE CBC W/AUTO DIFF WBC: CPT

## 2023-05-26 PROCEDURE — 80053 COMPREHEN METABOLIC PANEL: CPT

## 2023-05-26 PROCEDURE — 36415 COLL VENOUS BLD VENIPUNCTURE: CPT

## 2023-05-26 PROCEDURE — 96401 CHEMO ANTI-NEOPL SQ/IM: CPT

## 2023-05-26 RX ORDER — DIPHENHYDRAMINE HCL 25 MG
25 CAPSULE ORAL ONCE
Status: COMPLETED | OUTPATIENT
Start: 2023-05-26 | End: 2023-05-26

## 2023-05-26 RX ORDER — FAMOTIDINE 20 MG/1
20 TABLET, FILM COATED ORAL ONCE
Status: COMPLETED | OUTPATIENT
Start: 2023-05-26 | End: 2023-05-26

## 2023-05-26 RX ORDER — DIPHENHYDRAMINE HYDROCHLORIDE 50 MG/ML
50 INJECTION INTRAMUSCULAR; INTRAVENOUS AS NEEDED
Status: CANCELLED | OUTPATIENT
Start: 2023-05-26

## 2023-05-26 RX ORDER — DIPHENHYDRAMINE HCL 25 MG
25 CAPSULE ORAL ONCE
Status: CANCELLED | OUTPATIENT
Start: 2023-05-26

## 2023-05-26 RX ORDER — ACETAMINOPHEN 500 MG
1000 TABLET ORAL ONCE
Status: COMPLETED | OUTPATIENT
Start: 2023-05-26 | End: 2023-05-26

## 2023-05-26 RX ORDER — ACETAMINOPHEN 500 MG
1000 TABLET ORAL ONCE
Status: CANCELLED | OUTPATIENT
Start: 2023-05-26

## 2023-05-26 RX ORDER — FAMOTIDINE 10 MG/ML
20 INJECTION, SOLUTION INTRAVENOUS AS NEEDED
Status: CANCELLED | OUTPATIENT
Start: 2023-05-26

## 2023-05-26 RX ORDER — FAMOTIDINE 20 MG/1
20 TABLET, FILM COATED ORAL ONCE
Status: CANCELLED
Start: 2023-05-26 | End: 2023-05-26

## 2023-05-26 RX ADMIN — ACETAMINOPHEN 1000 MG: 500 TABLET, FILM COATED ORAL at 12:30

## 2023-05-26 RX ADMIN — DARATUMUMAB AND HYALURONIDASE-FIHJ (HUMAN RECOMBINANT) 1800 MG: 1800; 30000 INJECTION SUBCUTANEOUS at 13:04

## 2023-05-26 RX ADMIN — DIPHENHYDRAMINE HYDROCHLORIDE 25 MG: 25 CAPSULE ORAL at 12:30

## 2023-05-26 RX ADMIN — FAMOTIDINE 20 MG: 20 TABLET ORAL at 12:30

## 2023-05-26 NOTE — PROGRESS NOTES
Subjective      REASONS FOR FOLLOWUP:    1. 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 NO ANEMIA, NORMAL CREATININE,NORMAL CALCIUM, NO BONE PAIN NO ADENOPATHIES , NORMAL DIFFERENTIAL IN WBC.  2. LEUKOCYTOSIS AND THROMBOCYTOSIS.  3. GOITER VERY LIKELY HASHIMOTO'S THYROIDITIS.  4. SJOGREN'S SYNDROME.    HISTORY OF PRESENT ILLNESS:   The patient is a 52 y.o. female with a bunch history, who returns the office today for follow-up and lab review, due today for darzalex which she receives every 2 weeks.  When evaluated last by Dr. Freedman, was recommended she stop taking the Velcade due to neuropathic pain and diarrhea.  She also continues on Revlimid 15 mg 21 out of 28 days.  She tolerates her regimen very well.  She has had improvement in her protein studies.  She continues to have pain in her legs which has been extensively evaluated.  This is overall unchanged.  She also has intermittent diarrhea    Past Medical History:   Diagnosis Date   • Achilles tendonitis    • Anemia    • Anxiety    • Arthritis     ankles, shoulders   • Asthma     childhood   • Benign essential hypertension    • Cerebral aneurysm    • GERD (gastroesophageal reflux disease)    • Headache    • History of anemia    • History of E. coli septicemia    • Multiple myeloma 2023   • Rotator cuff syndrome    • Seasonal allergies         Past Surgical History:   Procedure Laterality Date   • ABSCESS DRAINAGE  2000    Renal abscess   • CEREBRAL ANGIOGRAM  2020   • CRANIOTOMY  2020    with angiogram for aneurysm   • HYSTERECTOMY  2017    partial, both ovaries remain   • ROTATOR CUFF REPAIR Right 2012   • SHOULDER SURGERY          Current Outpatient Medications on File Prior to Visit   Medication Sig Dispense Refill   • acyclovir (ZOVIRAX) 400 MG tablet Take 1 tablet by mouth 2 (Two) Times a Day. 60 tablet 3   • albuterol sulfate  (90 Base) MCG/ACT inhaler Ventolin HFA 90 mcg/actuation aerosol inhaler     • Emgality 120  MG/ML auto-injector pen INJECT 1 ML SUBCUTANEOUSLY ONCE EVERY MONTH     • lenalidomide (Revlimid) 15 MG capsule TAKE 1 CAPSULE BY MOUTH ONCE DAILY FOR 21 DAYS ON AND 7 DAYS OFF 21 capsule 0   • levothyroxine (SYNTHROID, LEVOTHROID) 25 MCG tablet Take 1 tablet by mouth Daily.     • losartan (COZAAR) 100 MG tablet TAKE 1 TABLET BY MOUTH ONCE DAILY WITH MEALS FOR 30 DAYS     • metoprolol tartrate (LOPRESSOR) 50 MG tablet Take 1 tablet by mouth Every Morning.     • OLANZapine (ZyPREXA) 2.5 MG tablet Take 1 tablet by mouth Every Night. (Patient taking differently: Take 1 tablet by mouth Every Night. Taking PRN) 30 tablet 3   • ondansetron (ZOFRAN) 8 MG tablet Take 1 tablet by mouth 3 (Three) Times a Day As Needed for Nausea or Vomiting. (Patient taking differently: Take 1 tablet by mouth 3 (Three) Times a Day As Needed for Nausea or Vomiting. Taking PRN) 30 tablet 5   • Rimegepant Sulfate (NURTEC) 75 MG tablet dispersible tablet Take 1 tablet by mouth Daily As Needed.     • Rivaroxaban (Xarelto) 2.5 MG tablet Take 1 tablet by mouth Daily. 30 tablet 2   • clindamycin (Clindagel) 1 % gel Apply 1 application topically to the appropriate area as directed 2 (Two) Times a Day. (Patient not taking: Reported on 5/26/2023) 30 g 0   • dexamethasone (DECADRON) 4 MG tablet Take 5 tablets by mouth 1 (One) Time Per Week. Take in the morning with food. (Patient not taking: Reported on 5/26/2023) 20 tablet 8     No current facility-administered medications on file prior to visit.        ALLERGIES:    Allergies   Allergen Reactions   • Coconut Shortness Of Breath   • Eggs Or Egg-Derived Products Diarrhea        Social History     Socioeconomic History   • Marital status:    • Number of children: 3   Tobacco Use   • Smoking status: Never   • Smokeless tobacco: Never   Substance and Sexual Activity   • Alcohol use: Yes   • Drug use: Never        Family History   Problem Relation Age of Onset   • Hypertension Father    • Multiple  "sclerosis Brother    • Diabetes Brother    • Colon cancer Paternal Aunt    • Heart attack Maternal Grandmother    • Heart disease Maternal Grandmother    • Pancreatic cancer Maternal Grandfather    • Colon cancer Paternal Grandmother    • Heart attack Paternal Grandfather    • Cancer Paternal Grandfather       Objective     Vitals:    05/26/23 1150   BP: 123/85   Pulse: 62   Resp: 16   Temp: 98 °F (36.7 °C)   TempSrc: Temporal   SpO2: 97%   Weight: 89.4 kg (197 lb 3.2 oz)   Height: 165.1 cm (65\")   PainSc:   8   PainLoc: Knee  Comment: both and both legs         5/26/2023    11:51 AM   Current Status   ECOG score 0       PHYSICAL EXAM :   GENERAL:  Well-developed, well-nourished in no acute distress.   SKIN:  Warm, dry without rashes, purpura or petechiae.  HEAD:  Normocephalic.  EYES:  Pupils equal, round.  EOMs intact.  Conjunctivae normal.  EARS:  Hearing intact.  NOSE:  Septum midline.  No excoriations or nasal discharge.  CHEST:  Lungs clear to auscultation. Good airflow.  CARDIAC:  Regular rate and rhythm without murmurs. Normal S1,S2.  ABDOMEN:  Soft, nontender with no organomegaly or masses. Bowel sounds present  EXTREMITIES:  No clubbing, cyanosis or edema.  NEUROLOGICAL: No focal neurological deficits.  PSYCHIATRIC:  Normal affect and mood.      I have reexamined the patient and the results are consistent with the previously documented exam. NICOLA Robles      RECENT LABS:  Results from last 7 days   Lab Units 05/26/23  1136   WBC 10*3/mm3 6.77   NEUTROS ABS 10*3/mm3 4.24   HEMOGLOBIN g/dL 11.9*   HEMATOCRIT % 37.2   PLATELETS 10*3/mm3 429     Results from last 7 days   Lab Units 05/26/23  1136   SODIUM mmol/L 137   POTASSIUM mmol/L 4.4   CHLORIDE mmol/L 101   CO2 mmol/L 24.2   BUN mg/dL 10   CREATININE mg/dL 0.77   CALCIUM mg/dL 9.4   ALBUMIN g/dL 4.2   BILIRUBIN mg/dL 0.2   ALK PHOS U/L 71   ALT (SGPT) U/L 15   AST (SGOT) U/L 20   GLUCOSE mg/dL 90           Assessment & Plan     Diagnoses " and all orders for this visit:    1. Multiple myeloma not having achieved remission (Primary)  -     Cancel: famotidine (PEPCID) tablet 20 mg  -     Cancel: acetaminophen (TYLENOL) tablet 1,000 mg  -     Cancel: diphenhydrAMINE (BENADRYL) capsule 25 mg  -     Cancel: daratumumab-hyaluronidase-fihj (DARZALEX FASPRO) [id number] MG-UT/15ML injection 1,800 mg    2. High risk medication use    3. Pain in both lower extremities    Other orders  -     Cancel: Hydrocortisone Sod Suc (PF) (Solu-CORTEF) injection 100 mg  -     Cancel: diphenhydrAMINE (BENADRYL) injection 50 mg  -     Cancel: famotidine (PEPCID) injection 20 mg       1.  Multiple Myeloma:    · Referred 11/14/2022 for leukocytosis and thrombocytosis by rheumatology, Dr. Ayala.  Diagnosed with Sjogren's by Dr. Ayala.  · History of anemia when she had a cerebral aneurysm clip done in 2019.  · Patient found to have serum protein electrophoresis that disclosed a monoclonal protein   · IgG monoclonal protein in the 2000 category, Bence-Moon protein in the urine, very small amount that is not quantifiable.  · Recommend patient proceed with bone marrow testing.  · 1/24/2023 bone marrow biopsy  · Bone marrow biopsy showing 18% plasma cells infiltrating in the bone marrow. Also absence of iron stores. The patient had no evidence of myelofibrosis, lymphoma, leukemia, or myelodysplasia.   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 [place] red stain was negative for amyloid deposition  · Recommended treatment with combination of Darzalex once a week, Velcade subcutaneously once a week, 3 weeks out of 4, Revlimid 15 mg a day for 3 weeks out of a month and dexamethasone 20 mg once a week.  · Plan to treat the patient for the next 4 months following her monoclonal protein and eventually referred to [facility] for consideration of high-dose chemotherapy and stem cell transplant.  · No evidence of lytic lesions,  therefore will not use Zometa or Xgeva at this time.  · 2/3/2023 cycle 1 Darzalex Faspro and Velcade.  · 02/10/2023 the patient was further reviewed the day that she comes for her 2nd Darzalex infusion and 2nd Velcade injection. So far the patient has not encountered any side effects of the medicines.  · Seen 2/24/2023 due for cycle 1 day 22.  Patient started her Revlimid 15 mg daily for 3 out of 4 weeks last Wednesday.  So far she is tolerating all of her treatment fairly well without any significant side effects other than ongoing fatigue.  · 3/10/2023 here for cycle 2, day 8 Velcade, Darzalex fast pro, continuing on Revlimid 15 mg daily for 3 weeks on, 1 week off as well as dexamethasone 20 mg weekly.  We will recheck paraprotein studies today.  On 03/17/2023,  She has had resolution of pain in her lower extremities.  Her monoclonal protein has dropped by half in only 1 month of therapy.  She questions referral to BMT, which will be made to Dr. Plasencia in May or June.  3/31/2023: C3D1 Darzalex and Velcade.  Continues Revlimid at home, currently on her second week on Revlimid.  Continues dexamethasone weekly.  Tolerating well.  4/7/2023 seen today for cycle 3, day 8.  She will receive Velcade only today (Darzalex which is every other week).  She will continue her Revlimid 15 mg daily for 21 out of 28 days, as well as dexamethasone 20 mg weekly.  Repeat paraprotein studies pending for today.  4/14/2023 due for cycle 3, day 15 Velcade continuing to complain of leg pain.  We reviewed M spike improved to 0.5,Free light chain kappa 22.2, kappa/lambda ratio 1.8.   4/21/2023 patient returns for cycle 3-day 22 for follow-up of her leg pain.  X-rays performed 4/19/2023 show bilateral knee degeneration however otherwise unremarkable.  Patient did see rheumatology this week and they made multiple medication changes suggestions for her primary care and have asked the patient to make an appointment with them.  We will go ahead  and place a referral for cardiology evaluation as recommended by rheumatology.  Case discussed with Dr. Freedman today and we will proceed with bilateral lower extremity venous Doppler prior to her next visit.   Additional labs obtained including B12, folate, CK all unremarkable.  4/28/2023: Proceed with cycle 4-day 1 Darzalex and Velcade.  Bilateral Doppler negative for DVT.  Consult with cardiology scheduled 5/12/2023.  Continues with swelling/tightness to the bilateral lower extremities.  Saw her PCP who discontinued amlodipine and started losartan.  Scheduled to see cardiology 5/12/2023.  She does feel the swelling/tightness is stable.  5/12/2023, evaluation by Dr. Freedman with recommendations to discontinue Velcade.  Continue Darzalex, weekly dexamethasone 20 mg and Revlimid 15 mg 21 out of 28 days.  She was referred to [facility] bone marrow transplant team  Evaluation by [facility] bone marrow transplant team 5/16/2023.  I have requested these records.  However, the patient reports they recommended stopping chemotherapy.  We will await Dr. Freedman to make this decision  Proceed today with darzalex which is continued every 2 weeks, continue current schedule of Revlimid 15 mg 21 out of 28 days    2.  Nodular goiter: she has a normal TSH and a normal T4. I will let Beau Frye MD, the patient's Primary Physician address this issue eventually with an ultrasound and clinical examination. The patient has antithyroid antibody positivity and very likely in my opinion she probably has a component of Hashimoto's thyroiditis.   On 03/17/2023 her goiter is no different today than what it was during the original consultation. This will be watched in absence of any other intervention.  On 05/12/2023 we know that she has Hashimoto thyroiditis and her thyroid function has remained stable at this time.    3.  Prophylaxis: recommend she start low-dose Xarelto 2.5 mg p.o. daily after she has had her  molar extracted for initiation and before initiation of her Revlimid administration.   Continue taking low-dose Xarelto 2.5 mg a day for prevention of thrombosis associated with Revlimid.  Tolerating well without signs or symptoms of bleeding or DVT.    Tolerating Xarelto without signs or symptoms of bleeding    4.  Iron deficiency:  · given her absence of iron stores in her bone marrow she will require IV iron therapy. This could be accommodated at some point between all the infusions and treatments that she needs to have.  Hemoglobin stable at 11.9      5.  Sjogren's syndrome with myopathy:  · Followed by rheumatology  · On 03/17/2023 the patient has minimal symptomatology pertinent to this at this time besides minimal dryness in her eyes and oral mucosas.  On 05/12/2023 continues having pain in the lower extremities. Sounds more neuropathic than anything else. Velcade discontinued at this time.   Velcade discontinued 2 weeks ago with ongoing pain.  She was encouraged to follow-up with rheumatology or neurosurgery to review previous MRIs of the spine which do indicate degenerative disc disease      6.  Skin rash-called 3/23/2023 to report skin rash to the bilateral cheeks, neck, spreading towards her shoulders.  She was advised to hold Bactrim.  She was also sent clindamycin gel which she started on Tuesday.  Skin rash is now only present to the bilateral cheeks and much improved.  She will continue clindamycin gel until resolution.  She will continue to hold Bactrim.  · Skin rash improved continuing on clindamycin gel.  · Resolved.    PLAN:   1. Proceed today with darzalex which is continued per protocol every 2 weeks  2. Continue Revlimid 15 mg 21 out of 28 days.  She reports she has 5 days remaining of the cycle before beginning her week off  3. Continue weekly dexamethasone 20 mg daily  4. Continue Xarelto prophylaxis 2.5 mg daily  5. I requested records from [facility] bone marrow transplant  center  6. I encouraged the patient to reach out to rheumatology or neurosurgery to discuss ongoing lower extremity pain  7. Return in 2 weeks for CBC, CMP, darzalex  8. MD follow-up with Dr. Freedman in 4 weeks with CBC, CMP, darzalex    Patient is on a high risk medication requiring close monitoring for toxicity.    Swetha Armstrong, APRN  05/26/2023

## 2023-06-01 LAB
DX PRELIMINARY: NORMAL
LAB AP CASE REPORT: NORMAL
LAB AP CLINICAL INFORMATION: NORMAL
LAB AP DIAGNOSIS COMMENT: NORMAL
Lab: NORMAL
PATH REPORT.ADDENDUM SPEC: NORMAL
PATH REPORT.FINAL DX SPEC: NORMAL
PATH REPORT.GROSS SPEC: NORMAL

## 2023-06-03 DIAGNOSIS — C90.00 MULTIPLE MYELOMA NOT HAVING ACHIEVED REMISSION: ICD-10-CM

## 2023-06-05 RX ORDER — ACYCLOVIR 400 MG/1
TABLET ORAL
Qty: 60 TABLET | Refills: 0 | Status: SHIPPED | OUTPATIENT
Start: 2023-06-05

## 2023-06-09 ENCOUNTER — INFUSION (OUTPATIENT)
Dept: ONCOLOGY | Facility: HOSPITAL | Age: 53
End: 2023-06-09
Payer: COMMERCIAL

## 2023-06-09 ENCOUNTER — LAB (OUTPATIENT)
Dept: LAB | Facility: HOSPITAL | Age: 53
End: 2023-06-09
Payer: COMMERCIAL

## 2023-06-09 VITALS
DIASTOLIC BLOOD PRESSURE: 66 MMHG | SYSTOLIC BLOOD PRESSURE: 141 MMHG | HEART RATE: 63 BPM | TEMPERATURE: 98.4 F | RESPIRATION RATE: 16 BRPM | WEIGHT: 192.2 LBS | OXYGEN SATURATION: 98 % | BODY MASS INDEX: 31.98 KG/M2

## 2023-06-09 DIAGNOSIS — C90.00 MULTIPLE MYELOMA NOT HAVING ACHIEVED REMISSION: ICD-10-CM

## 2023-06-09 DIAGNOSIS — C90.00 MULTIPLE MYELOMA NOT HAVING ACHIEVED REMISSION: Primary | ICD-10-CM

## 2023-06-09 LAB
ALBUMIN SERPL-MCNC: 4 G/DL (ref 3.5–5.2)
ALBUMIN/GLOB SERPL: 1.3 G/DL (ref 1.1–2.4)
ALP SERPL-CCNC: 73 U/L (ref 38–116)
ALT SERPL W P-5'-P-CCNC: 16 U/L (ref 0–33)
ANION GAP SERPL CALCULATED.3IONS-SCNC: 13 MMOL/L (ref 5–15)
AST SERPL-CCNC: 18 U/L (ref 0–32)
BASOPHILS # BLD AUTO: 0.06 10*3/MM3 (ref 0–0.2)
BASOPHILS NFR BLD AUTO: 1 % (ref 0–1.5)
BILIRUB SERPL-MCNC: 0.2 MG/DL (ref 0.2–1.2)
BUN SERPL-MCNC: 10 MG/DL (ref 6–20)
BUN/CREAT SERPL: 14.3 (ref 7.3–30)
CALCIUM SPEC-SCNC: 9.4 MG/DL (ref 8.5–10.2)
CHLORIDE SERPL-SCNC: 105 MMOL/L (ref 98–107)
CO2 SERPL-SCNC: 22 MMOL/L (ref 22–29)
CREAT SERPL-MCNC: 0.7 MG/DL (ref 0.6–1.1)
DEPRECATED RDW RBC AUTO: 45.8 FL (ref 37–54)
EGFRCR SERPLBLD CKD-EPI 2021: 105.5 ML/MIN/1.73
EOSINOPHIL # BLD AUTO: 0.36 10*3/MM3 (ref 0–0.4)
EOSINOPHIL NFR BLD AUTO: 5.7 % (ref 0.3–6.2)
ERYTHROCYTE [DISTWIDTH] IN BLOOD BY AUTOMATED COUNT: 14.3 % (ref 12.3–15.4)
GLOBULIN UR ELPH-MCNC: 3.1 GM/DL (ref 1.8–3.5)
GLUCOSE SERPL-MCNC: 88 MG/DL (ref 74–124)
HCT VFR BLD AUTO: 38 % (ref 34–46.6)
HGB BLD-MCNC: 12.2 G/DL (ref 12–15.9)
IMM GRANULOCYTES # BLD AUTO: 0.01 10*3/MM3 (ref 0–0.05)
IMM GRANULOCYTES NFR BLD AUTO: 0.2 % (ref 0–0.5)
LYMPHOCYTES # BLD AUTO: 1.79 10*3/MM3 (ref 0.7–3.1)
LYMPHOCYTES NFR BLD AUTO: 28.5 % (ref 19.6–45.3)
MCH RBC QN AUTO: 28.4 PG (ref 26.6–33)
MCHC RBC AUTO-ENTMCNC: 32.1 G/DL (ref 31.5–35.7)
MCV RBC AUTO: 88.6 FL (ref 79–97)
MONOCYTES # BLD AUTO: 1.03 10*3/MM3 (ref 0.1–0.9)
MONOCYTES NFR BLD AUTO: 16.4 % (ref 5–12)
NEUTROPHILS NFR BLD AUTO: 3.03 10*3/MM3 (ref 1.7–7)
NEUTROPHILS NFR BLD AUTO: 48.2 % (ref 42.7–76)
NRBC BLD AUTO-RTO: 0 /100 WBC (ref 0–0.2)
PLATELET # BLD AUTO: 371 10*3/MM3 (ref 140–450)
PMV BLD AUTO: 10.2 FL (ref 6–12)
POTASSIUM SERPL-SCNC: 3.9 MMOL/L (ref 3.5–4.7)
PROT SERPL-MCNC: 7.1 G/DL (ref 6.3–8)
RBC # BLD AUTO: 4.29 10*6/MM3 (ref 3.77–5.28)
SODIUM SERPL-SCNC: 140 MMOL/L (ref 134–145)
WBC NRBC COR # BLD: 6.28 10*3/MM3 (ref 3.4–10.8)

## 2023-06-09 PROCEDURE — 63710000001 DIPHENHYDRAMINE PER 50 MG: Performed by: NURSE PRACTITIONER

## 2023-06-09 PROCEDURE — 36415 COLL VENOUS BLD VENIPUNCTURE: CPT

## 2023-06-09 PROCEDURE — 85025 COMPLETE CBC W/AUTO DIFF WBC: CPT

## 2023-06-09 PROCEDURE — 80053 COMPREHEN METABOLIC PANEL: CPT

## 2023-06-09 RX ORDER — FAMOTIDINE 20 MG/1
20 TABLET, FILM COATED ORAL ONCE
Status: COMPLETED | OUTPATIENT
Start: 2023-06-09 | End: 2023-06-09

## 2023-06-09 RX ORDER — DIPHENHYDRAMINE HYDROCHLORIDE 50 MG/ML
50 INJECTION INTRAMUSCULAR; INTRAVENOUS AS NEEDED
OUTPATIENT
Start: 2023-06-09

## 2023-06-09 RX ORDER — ACETAMINOPHEN 500 MG
1000 TABLET ORAL ONCE
Status: COMPLETED | OUTPATIENT
Start: 2023-06-09 | End: 2023-06-09

## 2023-06-09 RX ORDER — DIPHENHYDRAMINE HCL 25 MG
25 CAPSULE ORAL ONCE
Status: COMPLETED | OUTPATIENT
Start: 2023-06-09 | End: 2023-06-09

## 2023-06-09 RX ORDER — FAMOTIDINE 10 MG/ML
20 INJECTION, SOLUTION INTRAVENOUS AS NEEDED
OUTPATIENT
Start: 2023-06-09

## 2023-06-09 RX ADMIN — ACETAMINOPHEN 1000 MG: 500 TABLET, FILM COATED ORAL at 12:51

## 2023-06-09 RX ADMIN — FAMOTIDINE 20 MG: 20 TABLET ORAL at 12:51

## 2023-06-09 RX ADMIN — DIPHENHYDRAMINE HYDROCHLORIDE 25 MG: 25 CAPSULE ORAL at 12:51

## 2023-06-09 NOTE — NURSING NOTE
Pt reports severe diarrhea, but feels it has been related to Revelimid.  Pt has been discussing this with Dr. Freedman at previous appts as well.  Pt states that she missed a dose of Revelimid and did not experience diarrhea that day.  She is currently on her 7 day break from Revelimid and stool output has been more normal.    D/W Dr. Freedman and he wishes to hold both Darzalex and Revelimid until next appt day with him to discuss further.  Pt explained this plan and is agreeable.    Pharmacy notified that Darzalex faspro will not be given today and also noted on MAR.

## 2023-06-13 ENCOUNTER — TELEPHONE (OUTPATIENT)
Dept: ONCOLOGY | Facility: CLINIC | Age: 53
End: 2023-06-13
Payer: COMMERCIAL

## 2023-06-13 NOTE — TELEPHONE ENCOUNTER
Called patient to relay message we will hold treatment and keep appt with Dr. Freedman. Pt v/u. Jonelle Hernandez RN

## 2023-06-14 LAB
DX PRELIMINARY: NORMAL
LAB AP CASE REPORT: NORMAL
LAB AP CLINICAL INFORMATION: NORMAL
LAB AP DIAGNOSIS COMMENT: NORMAL
Lab: NORMAL
Lab: NORMAL
PATH REPORT.ADDENDUM SPEC: NORMAL
PATH REPORT.FINAL DX SPEC: NORMAL
PATH REPORT.GROSS SPEC: NORMAL

## 2023-07-25 ENCOUNTER — APPOINTMENT (OUTPATIENT)
Dept: WOMENS IMAGING | Facility: HOSPITAL | Age: 53
End: 2023-07-25
Payer: COMMERCIAL

## 2023-07-25 PROCEDURE — 77067 SCR MAMMO BI INCL CAD: CPT | Performed by: RADIOLOGY

## 2023-07-25 PROCEDURE — 77063 BREAST TOMOSYNTHESIS BI: CPT | Performed by: RADIOLOGY

## 2023-09-19 ENCOUNTER — LAB (OUTPATIENT)
Dept: LAB | Facility: HOSPITAL | Age: 53
End: 2023-09-19
Payer: COMMERCIAL

## 2023-09-19 DIAGNOSIS — C90.00 MULTIPLE MYELOMA NOT HAVING ACHIEVED REMISSION: Primary | ICD-10-CM

## 2023-09-19 PROCEDURE — 36415 COLL VENOUS BLD VENIPUNCTURE: CPT

## 2023-09-21 LAB
IGA SERPL-MCNC: 109 MG/DL (ref 87–352)
IGG SERPL-MCNC: 1129 MG/DL (ref 586–1602)
IGM SERPL-MCNC: 60 MG/DL (ref 26–217)
PROT PATTERN SERPL IFE-IMP: ABNORMAL

## 2023-09-26 ENCOUNTER — OFFICE VISIT (OUTPATIENT)
Dept: ONCOLOGY | Facility: CLINIC | Age: 53
End: 2023-09-26
Payer: COMMERCIAL

## 2023-09-26 VITALS
OXYGEN SATURATION: 98 % | HEART RATE: 95 BPM | SYSTOLIC BLOOD PRESSURE: 155 MMHG | BODY MASS INDEX: 31.47 KG/M2 | TEMPERATURE: 98 F | HEIGHT: 65 IN | WEIGHT: 188.9 LBS | DIASTOLIC BLOOD PRESSURE: 95 MMHG

## 2023-09-26 DIAGNOSIS — C90.00 MULTIPLE MYELOMA NOT HAVING ACHIEVED REMISSION: Primary | ICD-10-CM

## 2023-09-26 NOTE — PROGRESS NOTES
Subjective      REASONS FOR FOLLOWUP:    1. 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 NO ANEMIA, NORMAL CREATININE,NORMAL CALCIUM, NO BONE PAIN NO ADENOPATHIES , NORMAL DIFFERENTIAL IN WBC.  2. LEUKOCYTOSIS AND THROMBOCYTOSIS.  3. GOITER VERY LIKELY HASHIMOTO'S THYROIDITIS.  4. SJOGREN'S SYNDROME.    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 As per discussion with the Bone Marrow Transplantation Team at the [facility] we decided to hold off on any further therapy for myeloma waiting to see what the reaction of her monoclonal protein is going to do in the next several months.     Physically the patient feels relatively well, still with aches and pains in multiple sites of her body, especially muscles in the chest, cramping in the legs, and things of this nature. She has not developed any obvious sensory neuropathy. Appetite remains stable. Her bowel function and urination are normal. She has no new cardiovascular or respiratory issues. No fevers, chills, or sweats. No pruritus, bleeding or infections.    She has no bone pain per se.      Past Medical History:   Diagnosis Date    Achilles tendonitis     Anemia     Anxiety     Arthritis     ankles, shoulders    Asthma     childhood    Benign essential hypertension     Cerebral aneurysm     GERD (gastroesophageal reflux disease)     Headache     History of anemia     History of E. coli septicemia     Multiple myeloma 2023    Rotator cuff syndrome     Seasonal allergies         Past Surgical History:   Procedure Laterality Date    ABSCESS DRAINAGE  2000    Renal abscess    CEREBRAL ANGIOGRAM  2020    CRANIOTOMY  2020    with angiogram for aneurysm    HYSTERECTOMY  2017    partial, both ovaries remain    ROTATOR CUFF REPAIR Right 2012    SHOULDER SURGERY          Current Outpatient Medications  on File Prior to Visit   Medication Sig Dispense Refill    acyclovir (ZOVIRAX) 400 MG tablet Take 1 tablet by mouth twice daily 60 tablet 0    albuterol sulfate  (90 Base) MCG/ACT inhaler Ventolin HFA 90 mcg/actuation aerosol inhaler      clindamycin (Clindagel) 1 % gel Apply 1 application topically to the appropriate area as directed 2 (Two) Times a Day. 30 g 0    dexamethasone (DECADRON) 4 MG tablet Take 5 tablets by mouth 1 (One) Time Per Week. Take in the morning with food. 20 tablet 8    Emgality 120 MG/ML auto-injector pen INJECT 1 ML SUBCUTANEOUSLY ONCE EVERY MONTH      levothyroxine (SYNTHROID, LEVOTHROID) 25 MCG tablet Take 1 tablet by mouth Daily.      losartan (COZAAR) 100 MG tablet TAKE 1 TABLET BY MOUTH ONCE DAILY WITH MEALS FOR 30 DAYS      metoprolol tartrate (LOPRESSOR) 50 MG tablet Take 1 tablet by mouth Every Morning.      OLANZapine (ZyPREXA) 2.5 MG tablet Take 1 tablet by mouth Every Night. (Patient taking differently: Take 1 tablet by mouth Every Night. Taking PRN) 30 tablet 3    ondansetron (ZOFRAN) 8 MG tablet Take 1 tablet by mouth 3 (Three) Times a Day As Needed for Nausea or Vomiting. (Patient taking differently: Take 1 tablet by mouth 3 (Three) Times a Day As Needed for Nausea or Vomiting. Taking PRN) 30 tablet 5    Rimegepant Sulfate (NURTEC) 75 MG tablet dispersible tablet Take 1 tablet by mouth Daily As Needed.      Rivaroxaban (Xarelto) 2.5 MG tablet Take 1 tablet by mouth Daily. 30 tablet 2    Xiidra 5 % ophthalmic solution 1 drop.       No current facility-administered medications on file prior to visit.        ALLERGIES:    Allergies   Allergen Reactions    Coconut Shortness Of Breath    Eggs Or Egg-Derived Products Diarrhea        Social History     Socioeconomic History    Marital status:     Number of children: 3   Tobacco Use    Smoking status: Never    Smokeless tobacco: Never   Substance and Sexual Activity    Alcohol use: Yes    Drug use: Never        Family  "History   Problem Relation Age of Onset    Hypertension Father     Multiple sclerosis Brother     Diabetes Brother     Colon cancer Paternal Aunt     Heart attack Maternal Grandmother     Heart disease Maternal Grandmother     Pancreatic cancer Maternal Grandfather     Colon cancer Paternal Grandmother     Heart attack Paternal Grandfather     Cancer Paternal Grandfather       Objective     Vitals:    09/26/23 1356   BP: 155/95   Pulse: 95   Temp: 98 °F (36.7 °C)   TempSrc: Temporal   SpO2: 98%   Weight: 85.7 kg (188 lb 14.4 oz)   Height: 165.1 cm (65\")   PainSc:   6   PainLoc: Generalized         9/26/2023     1:55 PM   Current Status   ECOG score 0       PHYSICAL EXAM :           GENERAL:  Well-developed, Patient  in no acute distress.   SKIN:  Warm, dry ,NO purpura ,no rash.  HEENT:  Pupils were equal and reactive to light and accomodation, conjunctivae noninjected,  normal visual acuity.   NECK:  Supple with good range of motion; no thyromegaly , no JVD or bruits,.No carotid artery pain, no carotid abnormal pulsation   LYMPHATICS:  No cervical, NO supraclavicular, NO axillary, NO inguinal adenopathies.  CARDIAC   normal rate , regular rhythm, without murmur,NO rubs NO S3 NO S4   LUNGS: normal breath sounds bilateral, no wheezing, NO rhonchi, NO crackles ,NO rubs.  VASCULAR VENOUS: no cyanosis, NO collateral circulation, NO varicosities, NO edema, NO palpable cords, NO pain,NO erythema, NO pigmentation of the skin.  ABDOMEN:  Soft, NO pain,no hepatomegaly, no splenomegaly,no masses, no ascites, no collateral circulation,no distention.  EXTREMITIES  AND SPINE:  No clubbing, no cyanosis ,no deformities , no pain .No kyphosis,  no pain in spine, no pain in ribs , no pain in pelvic bone.  NEUROLOGICAL:  Patient was awake, alert, oriented to time, person and place.    RECENT LABS:  Lab Results   Component Value Date    WBC 9.43 06/23/2023    HGB 12.8 06/23/2023    HCT 40.8 06/23/2023    MCV 89.3 06/23/2023     " 06/23/2023      Lab Results   Component Value Date    GLUCOSE 99 06/23/2023    BUN 15 06/23/2023    CREATININE 0.75 06/23/2023    EGFR 97.1 06/23/2023    BCR 20.0 06/23/2023    K 4.4 06/23/2023    CO2 21.7 (L) 06/23/2023    CALCIUM 9.5 06/23/2023    PROTENTOTREF 6.9 06/23/2023    ALBUMIN 4.4 06/23/2023    ALBUMIN 3.7 06/23/2023    BILITOT 0.2 06/23/2023    AST 38 (H) 06/23/2023    ALT 20 06/23/2023                        Assessment & Plan     Diagnoses and all orders for this visit:    1. Multiple myeloma not having achieved remission (Primary)  -     CBC & Differential; Future  -     Comprehensive Metabolic Panel; Future  -     SUNNY,PE and FLC, Serum; Future       1.  Multiple Myeloma:    Referred 11/14/2022 for leukocytosis and thrombocytosis by rheumatology, Dr. Ayala.  Diagnosed with Sjogren's by Dr. Ayala.  History of anemia when she had a cerebral aneurysm clip done in 2019.  Patient found to have serum protein electrophoresis that disclosed a monoclonal protein   IgG monoclonal protein in the 2000 category, Bence-Moon protein in the urine, very small amount that is not quantifiable.  Recommend patient proceed with bone marrow testing.  1/24/2023 bone marrow biopsy  Bone marrow biopsy showing 18% plasma cells infiltrating in the bone marrow. Also absence of iron stores. The patient had no evidence of myelofibrosis, lymphoma, leukemia, or myelodysplasia.   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 [place] red stain was negative for amyloid deposition  Recommended treatment with combination of Darzalex once a week, Velcade subcutaneously once a week, 3 weeks out of 4, Revlimid 15 mg a day for 3 weeks out of a month and dexamethasone 20 mg once a week.  Plan to treat the patient for the next 4 months following her monoclonal protein and eventually referred to [facility] for consideration of high-dose chemotherapy and stem cell transplant.  No evidence  of lytic lesions, therefore will not use Zometa or Xgeva at this time.  2/3/2023 cycle 1 Darzalex Faspro and Velcade.  02/10/2023 the patient was further reviewed the day that she comes for her 2nd Darzalex infusion and 2nd Velcade injection. So far the patient has not encountered any side effects of the medicines.  Seen 2/24/2023 due for cycle 1 day 22.  Patient started her Revlimid 15 mg daily for 3 out of 4 weeks last Wednesday.  So far she is tolerating all of her treatment fairly well without any significant side effects other than ongoing fatigue.  3/10/2023 here for cycle 2, day 8 Velcade, Darzalex fast pro, continuing on Revlimid 15 mg daily for 3 weeks on, 1 week off as well as dexamethasone 20 mg weekly.  We will recheck paraprotein studies today.  On 03/17/2023,  She has had resolution of pain in her lower extremities.  Her monoclonal protein has dropped by half in only 1 month of therapy.  She questions referral to BMT, which will be made to Dr. Plasencia in May or June.  3/31/2023: C3D1 Darzalex and Velcade.  Continues Revlimid at home, currently on her second week on Revlimid.  Continues dexamethasone weekly.  Tolerating well.  4/7/2023 seen today for cycle 3, day 8.  She will receive Velcade only today (Darzalex which is every other week).  She will continue her Revlimid 15 mg daily for 21 out of 28 days, as well as dexamethasone 20 mg weekly.  Repeat paraprotein studies pending for today.  4/14/2023 due for cycle 3, day 15 Velcade continuing to complain of leg pain.  We reviewed M spike improved to 0.5,Free light chain kappa 22.2, kappa/lambda ratio 1.8.   4/21/2023 patient returns for cycle 3-day 22 for follow-up of her leg pain.  X-rays performed 4/19/2023 show bilateral knee degeneration however otherwise unremarkable.  Patient did see rheumatology this week and they made multiple medication changes suggestions for her primary care and have asked the patient to make an appointment with them.  We will  go ahead and place a referral for cardiology evaluation as recommended by rheumatology.  Case discussed with Dr. Freedman today and we will proceed with bilateral lower extremity venous Doppler prior to her next visit.   Additional labs obtained including B12, folate, CK all unremarkable.  4/28/2023: Proceed with cycle 4-day 1 Darzalex and Velcade.  Bilateral Doppler negative for DVT.  Consult with cardiology scheduled 5/12/2023.  Continues with swelling/tightness to the bilateral lower extremities.  Saw her PCP who discontinued amlodipine and started losartan.  Scheduled to see cardiology 5/12/2023.  She does feel the swelling/tightness is stable.  5/12/2023, evaluation by Dr. Freedman with recommendations to discontinue Velcade.  Continue Darzalex, weekly dexamethasone 20 mg and Revlimid 15 mg 21 out of 28 days.  She was referred to [facility] bone marrow transplant team  Evaluation by [facility] bone marrow transplant team 5/16/2023.  I have requested these records.  However, the patient reports they recommended stopping chemotherapy.  We will await Dr. Freedman to make this decision  Proceed today with darzalex which is continued every 2 weeks, continue current schedule of Revlimid 15 mg 21 out of 28 days  On 09/26/2023 the patient clinically looks stable. She has no peripheral adenopathy, hepatosplenomegaly, bone pain. Obviously, neuropathy. Her white count, hemoglobin and platelets are normal. White count differential is normal. Chemistry profile is normal. Monoclonal protein studies are pending.     I advised the patient to remain in observation from this point of view and advised her to return to see us back in 4 months with repeat CBC, CMP, and serum protein immunoelectrophoresis. Find no need for radiological assessment at this time.      2.  Nodular goiter: she has a normal TSH and a normal T4. I will let Beau Frye MD, the patient's Primary Physician address this issue eventually  with an ultrasound and clinical examination. The patient has antithyroid antibody positivity and very likely in my opinion she probably has a component of Hashimoto's thyroiditis.   On 03/17/2023 her goiter is no different today than what it was during the original consultation. This will be watched in absence of any other intervention.  On 05/12/2023 we know that she has Hashimoto thyroiditis and her thyroid function has remained stable at this time.  On 09/26/2023 her goiter is no different today than what it has been before. This will be watched in absence of any other intervention.            3.  Sjogren's syndrome with myopathy:  Followed by rheumatology  On 03/17/2023 the patient has minimal symptomatology pertinent to this at this time besides minimal dryness in her eyes and oral mucosas.  On 05/12/2023 continues having pain in the lower extremities. Sounds more neuropathic than anything else. Velcade discontinued at this time.   Velcade discontinued 2 weeks ago with ongoing pain.  She was encouraged to follow-up with rheumatology or neurosurgery to review previous MRIs of the spine which do indicate degenerative disc disease      6.  Skin rash-called 3/23/2023 to report skin rash to the bilateral cheeks, neck, spreading towards her shoulders.  She was advised to hold Bactrim.  She was also sent clindamycin gel which she started on Tuesday.  Skin rash is now only present to the bilateral cheeks and much improved.  She will continue clindamycin gel until resolution.  She will continue to hold Bactrim.  Skin rash improved continuing on clindamycin gel.  Resolved.    PLAN:   I discussed with the patient on 09/26/2023 the fact that after discussing the case with the [facility] Bone Marrow Transplant Team, we decided mutually to put the patient in observation, withholding any medications that she was receiving for her low volume myeloma. We will see how her monoclonal proteins evolve in the next  several months and see if we need to resume any form of therapy.     Today, at least her CBC and chemistry profile look very normal. She will be informed about her monoclonal protein studies once that they become available. Plan to see her back in 4 months and repeat CBC, CMP and monoclonal protein studies.        Estuardo Freedman MD  09/26/2023

## 2024-01-16 ENCOUNTER — LAB (OUTPATIENT)
Dept: LAB | Facility: HOSPITAL | Age: 54
End: 2024-01-16
Payer: COMMERCIAL

## 2024-01-16 ENCOUNTER — OFFICE VISIT (OUTPATIENT)
Dept: ONCOLOGY | Facility: CLINIC | Age: 54
End: 2024-01-16
Payer: COMMERCIAL

## 2024-01-16 VITALS
HEART RATE: 71 BPM | BODY MASS INDEX: 31.71 KG/M2 | DIASTOLIC BLOOD PRESSURE: 98 MMHG | HEIGHT: 65 IN | WEIGHT: 190.3 LBS | TEMPERATURE: 98.4 F | SYSTOLIC BLOOD PRESSURE: 140 MMHG | OXYGEN SATURATION: 98 %

## 2024-01-16 DIAGNOSIS — C90.00 MULTIPLE MYELOMA NOT HAVING ACHIEVED REMISSION: ICD-10-CM

## 2024-01-16 DIAGNOSIS — C90.00 MULTIPLE MYELOMA NOT HAVING ACHIEVED REMISSION: Primary | ICD-10-CM

## 2024-01-16 LAB
ALBUMIN SERPL-MCNC: 4.4 G/DL (ref 3.5–5.2)
ALBUMIN/GLOB SERPL: 1.3 G/DL
ALP SERPL-CCNC: 72 U/L (ref 39–117)
ALT SERPL W P-5'-P-CCNC: 17 U/L (ref 1–33)
ANION GAP SERPL CALCULATED.3IONS-SCNC: 11 MMOL/L (ref 5–15)
AST SERPL-CCNC: 23 U/L (ref 1–32)
BASOPHILS # BLD AUTO: 0.06 10*3/MM3 (ref 0–0.2)
BASOPHILS NFR BLD AUTO: 0.7 % (ref 0–1.5)
BILIRUB SERPL-MCNC: 0.4 MG/DL (ref 0–1.2)
BUN SERPL-MCNC: 12 MG/DL (ref 6–20)
BUN/CREAT SERPL: 14.3 (ref 7–25)
CALCIUM SPEC-SCNC: 9.2 MG/DL (ref 8.6–10.5)
CHLORIDE SERPL-SCNC: 104 MMOL/L (ref 98–107)
CO2 SERPL-SCNC: 24 MMOL/L (ref 22–29)
CREAT SERPL-MCNC: 0.84 MG/DL (ref 0.57–1)
DEPRECATED RDW RBC AUTO: 43.8 FL (ref 37–54)
EGFRCR SERPLBLD CKD-EPI 2021: 84.3 ML/MIN/1.73
EOSINOPHIL # BLD AUTO: 0.12 10*3/MM3 (ref 0–0.4)
EOSINOPHIL NFR BLD AUTO: 1.4 % (ref 0.3–6.2)
ERYTHROCYTE [DISTWIDTH] IN BLOOD BY AUTOMATED COUNT: 13.8 % (ref 12.3–15.4)
GLOBULIN UR ELPH-MCNC: 3.5 GM/DL
GLUCOSE SERPL-MCNC: 84 MG/DL (ref 65–99)
HCT VFR BLD AUTO: 43.1 % (ref 34–46.6)
HGB BLD-MCNC: 13.8 G/DL (ref 12–15.9)
IMM GRANULOCYTES # BLD AUTO: 0.05 10*3/MM3 (ref 0–0.05)
IMM GRANULOCYTES NFR BLD AUTO: 0.6 % (ref 0–0.5)
LYMPHOCYTES # BLD AUTO: 2.8 10*3/MM3 (ref 0.7–3.1)
LYMPHOCYTES NFR BLD AUTO: 31.9 % (ref 19.6–45.3)
MCH RBC QN AUTO: 27.8 PG (ref 26.6–33)
MCHC RBC AUTO-ENTMCNC: 32 G/DL (ref 31.5–35.7)
MCV RBC AUTO: 86.7 FL (ref 79–97)
MONOCYTES # BLD AUTO: 0.68 10*3/MM3 (ref 0.1–0.9)
MONOCYTES NFR BLD AUTO: 7.8 % (ref 5–12)
NEUTROPHILS NFR BLD AUTO: 5.06 10*3/MM3 (ref 1.7–7)
NEUTROPHILS NFR BLD AUTO: 57.6 % (ref 42.7–76)
NRBC BLD AUTO-RTO: 0 /100 WBC (ref 0–0.2)
PLATELET # BLD AUTO: 516 10*3/MM3 (ref 140–450)
PMV BLD AUTO: 9.7 FL (ref 6–12)
POTASSIUM SERPL-SCNC: 3.7 MMOL/L (ref 3.5–5.2)
PROT SERPL-MCNC: 7.9 G/DL (ref 6–8.5)
RBC # BLD AUTO: 4.97 10*6/MM3 (ref 3.77–5.28)
SODIUM SERPL-SCNC: 139 MMOL/L (ref 136–145)
WBC NRBC COR # BLD AUTO: 8.77 10*3/MM3 (ref 3.4–10.8)

## 2024-01-16 PROCEDURE — 36415 COLL VENOUS BLD VENIPUNCTURE: CPT

## 2024-01-16 PROCEDURE — 85025 COMPLETE CBC W/AUTO DIFF WBC: CPT

## 2024-01-16 PROCEDURE — 80053 COMPREHEN METABOLIC PANEL: CPT

## 2024-01-16 RX ORDER — HYDROXYCHLOROQUINE SULFATE 200 MG/1
TABLET, FILM COATED ORAL
COMMUNITY
Start: 2024-01-13

## 2024-01-16 NOTE — PROGRESS NOTES
Subjective      REASONS FOR FOLLOWUP:    1. 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 NO ANEMIA, NORMAL CREATININE,NORMAL CALCIUM, NO BONE PAIN NO ADENOPATHIES , NORMAL DIFFERENTIAL IN WBC.  2. LEUKOCYTOSIS AND THROMBOCYTOSIS.  3. GOITER VERY LIKELY HASHIMOTO'S THYROIDITIS.  4. SJOGREN'S SYNDROME.    HISTORY OF PRESENT ILLNESS:  Patient returns today for follow up.  She states that she has been sick since Christmas.  She has actually been on 2 Z-Ajith's, and completed her last dose a few days ago.  She states that she had a sinus infection, double ear infection, as well as RSV.  She feels like her symptoms are finally improving.  She does try to wear a mask and gloves at work because it is so josseline/dirty however feels like she gets sick just being at work.  She continues to complain of pain in her arms and legs intermittently.  Her pain is about the same as it has been since August.      Past Medical History:   Diagnosis Date    Achilles tendonitis     Anemia     Anxiety     Arthritis     ankles, shoulders    Asthma     childhood    Benign essential hypertension     Cerebral aneurysm     GERD (gastroesophageal reflux disease)     Headache     History of anemia     History of E. coli septicemia     Multiple myeloma 2023    Rotator cuff syndrome     Seasonal allergies         Past Surgical History:   Procedure Laterality Date    ABSCESS DRAINAGE  2000    Renal abscess    CEREBRAL ANGIOGRAM  2020    CRANIOTOMY  2020    with angiogram for aneurysm    HYSTERECTOMY  2017    partial, both ovaries remain    ROTATOR CUFF REPAIR Right 2012    SHOULDER SURGERY          Current Outpatient Medications on File Prior to Visit   Medication Sig Dispense Refill    albuterol sulfate  (90 Base) MCG/ACT inhaler Ventolin HFA 90 mcg/actuation aerosol inhaler      Emgality 120 MG/ML auto-injector pen INJECT 1 ML SUBCUTANEOUSLY ONCE EVERY MONTH      hydroxychloroquine (PLAQUENIL) 200 MG tablet        "levothyroxine (SYNTHROID, LEVOTHROID) 25 MCG tablet Take 1 tablet by mouth Daily.      losartan (COZAAR) 100 MG tablet TAKE 1 TABLET BY MOUTH ONCE DAILY WITH MEALS FOR 30 DAYS      metoprolol tartrate (LOPRESSOR) 50 MG tablet Take 1 tablet by mouth Every Morning.      Xiidra 5 % ophthalmic solution 1 drop.      ondansetron (ZOFRAN) 8 MG tablet Take 1 tablet by mouth 3 (Three) Times a Day As Needed for Nausea or Vomiting. (Patient not taking: Reported on 1/16/2024) 30 tablet 5    Rimegepant Sulfate (NURTEC) 75 MG tablet dispersible tablet Take 1 tablet by mouth Daily As Needed. (Patient not taking: Reported on 1/16/2024)       No current facility-administered medications on file prior to visit.        ALLERGIES:    Allergies   Allergen Reactions    Coconut Shortness Of Breath    Eggs Or Egg-Derived Products Diarrhea        Social History     Socioeconomic History    Marital status:     Number of children: 3   Tobacco Use    Smoking status: Never    Smokeless tobacco: Never   Substance and Sexual Activity    Alcohol use: Yes    Drug use: Never        Family History   Problem Relation Age of Onset    Hypertension Father     Multiple sclerosis Brother     Diabetes Brother     Colon cancer Paternal Aunt     Heart attack Maternal Grandmother     Heart disease Maternal Grandmother     Pancreatic cancer Maternal Grandfather     Colon cancer Paternal Grandmother     Heart attack Paternal Grandfather     Cancer Paternal Grandfather       Objective     Vitals:    01/16/24 1513 01/16/24 1553   BP: (!) 158/101 140/98  Comment: manual left arm   Pulse: 71    Temp: 98.4 °F (36.9 °C)    TempSrc: Temporal    SpO2: 98%    Weight: 86.3 kg (190 lb 4.8 oz)    Height: 165.1 cm (65\")    PainSc:   7    PainLoc: Generalized  Comment: legs and knees          1/16/2024     3:03 PM   Current Status   ECOG score 0       Physical Exam  Vitals reviewed.   Constitutional:       General: She is not in acute distress.     Appearance: Normal " appearance. She is well-developed.   HENT:      Head: Normocephalic and atraumatic.   Eyes:      Pupils: Pupils are equal, round, and reactive to light.   Cardiovascular:      Rate and Rhythm: Normal rate and regular rhythm.      Heart sounds: Normal heart sounds. No murmur heard.  Pulmonary:      Effort: Pulmonary effort is normal. No respiratory distress.      Breath sounds: Normal breath sounds. No wheezing, rhonchi or rales.   Abdominal:      General: Bowel sounds are normal. There is no distension.      Palpations: Abdomen is soft.   Musculoskeletal:         General: Normal range of motion.      Cervical back: Normal range of motion.   Skin:     General: Skin is warm and dry.      Findings: No rash.   Neurological:      Mental Status: She is alert and oriented to person, place, and time.           RECENT LABS:  Lab Results   Component Value Date    WBC 8.77 01/16/2024    HGB 13.8 01/16/2024    HCT 43.1 01/16/2024    MCV 86.7 01/16/2024     (H) 01/16/2024      Lab Results   Component Value Date    GLUCOSE 84 01/16/2024    BUN 12 01/16/2024    CREATININE 0.84 01/16/2024    EGFR 84.3 01/16/2024    BCR 14.3 01/16/2024    K 3.7 01/16/2024    CO2 24.0 01/16/2024    CALCIUM 9.2 01/16/2024    PROTENTOTREF 6.9 06/23/2023    ALBUMIN 4.4 01/16/2024    BILITOT 0.4 01/16/2024    AST 23 01/16/2024    ALT 17 01/16/2024      Results from last 7 days   Lab Units 01/16/24  1503   WBC 10*3/mm3 8.77   NEUTROS ABS 10*3/mm3 5.06   HEMOGLOBIN g/dL 13.8   HEMATOCRIT % 43.1   PLATELETS 10*3/mm3 516*       Results from last 7 days   Lab Units 01/16/24  1503   SODIUM mmol/L 139   POTASSIUM mmol/L 3.7   CHLORIDE mmol/L 104   CO2 mmol/L 24.0   BUN mg/dL 12   CREATININE mg/dL 0.84   CALCIUM mg/dL 9.2   ALBUMIN g/dL 4.4   BILIRUBIN mg/dL 0.4   ALK PHOS U/L 72   ALT (SGPT) U/L 17   AST (SGOT) U/L 23   GLUCOSE mg/dL 84             Assessment & Plan     Diagnoses and all orders for this visit:    1. Multiple myeloma not having achieved  remission (Primary)         1.  Multiple Myeloma:    Referred 11/14/2022 for leukocytosis and thrombocytosis by rheumatology, Dr. Ayala.  Diagnosed with Sjogren's by Dr. Ayala.  History of anemia when she had a cerebral aneurysm clip done in 2019.  Patient found to have serum protein electrophoresis that disclosed a monoclonal protein   IgG monoclonal protein in the 2000 category, Bence-Moon protein in the urine, very small amount that is not quantifiable.  Recommend patient proceed with bone marrow testing.  1/24/2023 bone marrow biopsy  Bone marrow biopsy showing 18% plasma cells infiltrating in the bone marrow. Also absence of iron stores. The patient had no evidence of myelofibrosis, lymphoma, leukemia, or myelodysplasia.   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 [place] red stain was negative for amyloid deposition  Recommended treatment with combination of Darzalex once a week, Velcade subcutaneously once a week, 3 weeks out of 4, Revlimid 15 mg a day for 3 weeks out of a month and dexamethasone 20 mg once a week.  Plan to treat the patient for the next 4 months following her monoclonal protein and eventually referred to [facility] for consideration of high-dose chemotherapy and stem cell transplant.  No evidence of lytic lesions, therefore will not use Zometa or Xgeva at this time.  2/3/2023 cycle 1 Darzalex Faspro and Velcade.  02/10/2023 the patient was further reviewed the day that she comes for her 2nd Darzalex infusion and 2nd Velcade injection. So far the patient has not encountered any side effects of the medicines.  Seen 2/24/2023 due for cycle 1 day 22.  Patient started her Revlimid 15 mg daily for 3 out of 4 weeks last Wednesday.  So far she is tolerating all of her treatment fairly well without any significant side effects other than ongoing fatigue.  3/10/2023 here for cycle 2, day 8 Velcade, Darzalex fast pro, continuing on Revlimid  15 mg daily for 3 weeks on, 1 week off as well as dexamethasone 20 mg weekly.  We will recheck paraprotein studies today.  On 03/17/2023,  She has had resolution of pain in her lower extremities.  Her monoclonal protein has dropped by half in only 1 month of therapy.  She questions referral to BMT, which will be made to Dr. Plasencia in May or June.  3/31/2023: C3D1 Darzalex and Velcade.  Continues Revlimid at home, currently on her second week on Revlimid.  Continues dexamethasone weekly.  Tolerating well.  4/7/2023 seen today for cycle 3, day 8.  She will receive Velcade only today (Darzalex which is every other week).  She will continue her Revlimid 15 mg daily for 21 out of 28 days, as well as dexamethasone 20 mg weekly.  Repeat paraprotein studies pending for today.  4/14/2023 due for cycle 3, day 15 Velcade continuing to complain of leg pain.  We reviewed M spike improved to 0.5,Free light chain kappa 22.2, kappa/lambda ratio 1.8.   4/21/2023 patient returns for cycle 3-day 22 for follow-up of her leg pain.  X-rays performed 4/19/2023 show bilateral knee degeneration however otherwise unremarkable.  Patient did see rheumatology this week and they made multiple medication changes suggestions for her primary care and have asked the patient to make an appointment with them.  We will go ahead and place a referral for cardiology evaluation as recommended by rheumatology.  Case discussed with Dr. Freedman today and we will proceed with bilateral lower extremity venous Doppler prior to her next visit.   Additional labs obtained including B12, folate, CK all unremarkable.  4/28/2023: Proceed with cycle 4-day 1 Darzalex and Velcade.  Bilateral Doppler negative for DVT.  Consult with cardiology scheduled 5/12/2023.  Continues with swelling/tightness to the bilateral lower extremities.  Saw her PCP who discontinued amlodipine and started losartan.  Scheduled to see cardiology 5/12/2023.  She does feel the swelling/tightness  is stable.  5/12/2023, evaluation by Dr. Freedman with recommendations to discontinue Velcade.  Continue Darzalex, weekly dexamethasone 20 mg and Revlimid 15 mg 21 out of 28 days.  She was referred to [facility] bone marrow transplant team  Evaluation by [facility] bone marrow transplant team 5/16/2023.  I have requested these records.  However, the patient reports they recommended stopping chemotherapy.  We will await Dr. Freedman to make this decision  Proceed today with darzalex which is continued every 2 weeks, continue current schedule of Revlimid 15 mg 21 out of 28 days   09/26/2023 the patient clinically looks stable. She has no peripheral adenopathy, hepatosplenomegaly, bone pain. Obviously, neuropathy. Her white count, hemoglobin and platelets are normal. White count differential is normal. Chemistry profile is normal. Monoclonal protein studies are pending. I advised the patient to remain in observation from this point of view and advised her to return to see us back in 4 months with repeat CBC, CMP, and serum protein immunoelectrophoresis. Find no need for radiological assessment at this time.  Returns 1/16/2024 Continuing in observation in regards to her myeloma.  Continues to report intermittent issues with pain although it is unchanged from her previous visit.  Repeat paraprotein studies from today are pending.    2.  Nodular goiter: she has a normal TSH and a normal T4. I will let Beau Frye MD, the patient's Primary Physician address this issue eventually with an ultrasound and clinical examination. The patient has antithyroid antibody positivity and very likely in my opinion she probably has a component of Hashimoto's thyroiditis.   On 03/17/2023 her goiter is no different today than what it was during the original consultation. This will be watched in absence of any other intervention.  On 05/12/2023 we know that she has Hashimoto thyroiditis and her thyroid function has  remained stable at this time.  09/26/2023 her goiter is no different today than what it has been before. This will be watched in absence of any other intervention.    3.  Sjogren's syndrome with myopathy:  Followed by rheumatology  On 03/17/2023 the patient has minimal symptomatology pertinent to this at this time besides minimal dryness in her eyes and oral mucosas.  On 05/12/2023 continues having pain in the lower extremities. Sounds more neuropathic than anything else. Velcade discontinued at this time.   Velcade discontinued 2 weeks ago with ongoing pain.  She was encouraged to follow-up with rheumatology or neurosurgery to review previous MRIs of the spine which do indicate degenerative disc disease  Continues to follow with rheumatology.    4.  Skin rash-called 3/23/2023 to report skin rash to the bilateral cheeks, neck, spreading towards her shoulders.  She was advised to hold Bactrim.  She was also sent clindamycin gel which she started on Tuesday.  Skin rash is now only present to the bilateral cheeks and much improved.  She will continue clindamycin gel until resolution.  She will continue to hold Bactrim.  Skin rash improved continuing on clindamycin gel.  Resolved.    PLAN:   Repeat paraprotein studies from today are currently pending.  The patient will be called once we receive those results.  Otherwise for now she will return for follow-up visit in 3 months with Dr. Freedman with repeat labs at that time.  Continue to follow with rheumatology.  Call/ return sooner should the patient develop any new concerns or problems.        Mayra Muñoz, NICOLA  01/16/2024    I spent 33 minutes caring for Yudy on this date of service. This time includes time spent by me in the following activities: preparing for the visit, reviewing tests, obtaining and/or reviewing a separately obtained history, performing a medically appropriate examination and/or evaluation, counseling and educating the  patient/family/caregiver, referring and communicating with other health care professionals, documenting information in the medical record, and independently interpreting results and communicating that information with the patient/family/caregiver

## 2024-01-17 LAB
ALBUMIN SERPL ELPH-MCNC: 4.2 G/DL (ref 2.9–4.4)
ALBUMIN/GLOB SERPL: 1.3 {RATIO} (ref 0.7–1.7)
ALPHA1 GLOB SERPL ELPH-MCNC: 0.2 G/DL (ref 0–0.4)
ALPHA2 GLOB SERPL ELPH-MCNC: 0.9 G/DL (ref 0.4–1)
B-GLOBULIN SERPL ELPH-MCNC: 1 G/DL (ref 0.7–1.3)
GAMMA GLOB SERPL ELPH-MCNC: 1.3 G/DL (ref 0.4–1.8)
GLOBULIN SER-MCNC: 3.3 G/DL (ref 2.2–3.9)
IGA SERPL-MCNC: 159 MG/DL (ref 87–352)
IGG SERPL-MCNC: 1413 MG/DL (ref 586–1602)
IGM SERPL-MCNC: 73 MG/DL (ref 26–217)
INTERPRETATION SERPL IEP-IMP: ABNORMAL
KAPPA LC FREE SER-MCNC: 22.6 MG/L (ref 3.3–19.4)
KAPPA LC FREE/LAMBDA FREE SER: 1.92 {RATIO} (ref 0.26–1.65)
LABORATORY COMMENT REPORT: ABNORMAL
LAMBDA LC FREE SERPL-MCNC: 11.8 MG/L (ref 5.7–26.3)
M PROTEIN SERPL ELPH-MCNC: 0.3 G/DL
PROT SERPL-MCNC: 7.5 G/DL (ref 6–8.5)

## 2024-04-16 ENCOUNTER — LAB (OUTPATIENT)
Dept: LAB | Facility: HOSPITAL | Age: 54
End: 2024-04-16
Payer: COMMERCIAL

## 2024-04-16 ENCOUNTER — OFFICE VISIT (OUTPATIENT)
Dept: ONCOLOGY | Facility: CLINIC | Age: 54
End: 2024-04-16
Payer: COMMERCIAL

## 2024-04-16 VITALS
RESPIRATION RATE: 16 BRPM | HEART RATE: 63 BPM | TEMPERATURE: 98.2 F | BODY MASS INDEX: 32.99 KG/M2 | DIASTOLIC BLOOD PRESSURE: 99 MMHG | SYSTOLIC BLOOD PRESSURE: 170 MMHG | HEIGHT: 65 IN | WEIGHT: 198 LBS | OXYGEN SATURATION: 98 %

## 2024-04-16 DIAGNOSIS — C90.00 MULTIPLE MYELOMA NOT HAVING ACHIEVED REMISSION: ICD-10-CM

## 2024-04-16 DIAGNOSIS — D47.2 MONOCLONAL (M) PROTEIN DISEASE, MULTIPLE 'M' PROTEIN: Primary | ICD-10-CM

## 2024-04-16 LAB
ALBUMIN SERPL-MCNC: 4.3 G/DL (ref 3.5–5.2)
ALBUMIN/GLOB SERPL: 1.3 G/DL
ALP SERPL-CCNC: 82 U/L (ref 39–117)
ALT SERPL W P-5'-P-CCNC: 19 U/L (ref 1–33)
ANION GAP SERPL CALCULATED.3IONS-SCNC: 11.6 MMOL/L (ref 5–15)
AST SERPL-CCNC: 28 U/L (ref 1–32)
BASOPHILS # BLD AUTO: 0.08 10*3/MM3 (ref 0–0.2)
BASOPHILS NFR BLD AUTO: 0.7 % (ref 0–1.5)
BILIRUB SERPL-MCNC: 0.2 MG/DL (ref 0–1.2)
BUN SERPL-MCNC: 14 MG/DL (ref 6–20)
BUN/CREAT SERPL: 13.2 (ref 7–25)
CALCIUM SPEC-SCNC: 9.5 MG/DL (ref 8.6–10.5)
CHLORIDE SERPL-SCNC: 101 MMOL/L (ref 98–107)
CO2 SERPL-SCNC: 23.4 MMOL/L (ref 22–29)
CREAT SERPL-MCNC: 1.06 MG/DL (ref 0.57–1)
DEPRECATED RDW RBC AUTO: 45.1 FL (ref 37–54)
EGFRCR SERPLBLD CKD-EPI 2021: 63.7 ML/MIN/1.73
EOSINOPHIL # BLD AUTO: 0.12 10*3/MM3 (ref 0–0.4)
EOSINOPHIL NFR BLD AUTO: 1 % (ref 0.3–6.2)
ERYTHROCYTE [DISTWIDTH] IN BLOOD BY AUTOMATED COUNT: 14.6 % (ref 12.3–15.4)
GLOBULIN UR ELPH-MCNC: 3.4 GM/DL
GLUCOSE SERPL-MCNC: 100 MG/DL (ref 65–99)
HCT VFR BLD AUTO: 40.1 % (ref 34–46.6)
HGB BLD-MCNC: 13.1 G/DL (ref 12–15.9)
IMM GRANULOCYTES # BLD AUTO: 0.08 10*3/MM3 (ref 0–0.05)
IMM GRANULOCYTES NFR BLD AUTO: 0.7 % (ref 0–0.5)
LYMPHOCYTES # BLD AUTO: 3.11 10*3/MM3 (ref 0.7–3.1)
LYMPHOCYTES NFR BLD AUTO: 25.9 % (ref 19.6–45.3)
MCH RBC QN AUTO: 28.2 PG (ref 26.6–33)
MCHC RBC AUTO-ENTMCNC: 32.7 G/DL (ref 31.5–35.7)
MCV RBC AUTO: 86.2 FL (ref 79–97)
MONOCYTES # BLD AUTO: 0.85 10*3/MM3 (ref 0.1–0.9)
MONOCYTES NFR BLD AUTO: 7.1 % (ref 5–12)
NEUTROPHILS NFR BLD AUTO: 64.6 % (ref 42.7–76)
NEUTROPHILS NFR BLD AUTO: 7.77 10*3/MM3 (ref 1.7–7)
NRBC BLD AUTO-RTO: 0 /100 WBC (ref 0–0.2)
PLATELET # BLD AUTO: 477 10*3/MM3 (ref 140–450)
PMV BLD AUTO: 9.7 FL (ref 6–12)
POTASSIUM SERPL-SCNC: 3.8 MMOL/L (ref 3.5–5.2)
PROT SERPL-MCNC: 7.7 G/DL (ref 6–8.5)
RBC # BLD AUTO: 4.65 10*6/MM3 (ref 3.77–5.28)
SODIUM SERPL-SCNC: 136 MMOL/L (ref 136–145)
WBC NRBC COR # BLD AUTO: 12.01 10*3/MM3 (ref 3.4–10.8)

## 2024-04-16 PROCEDURE — 85025 COMPLETE CBC W/AUTO DIFF WBC: CPT

## 2024-04-16 PROCEDURE — 80053 COMPREHEN METABOLIC PANEL: CPT

## 2024-04-16 PROCEDURE — 36415 COLL VENOUS BLD VENIPUNCTURE: CPT

## 2024-04-16 NOTE — PROGRESS NOTES
Subjective      REASONS FOR FOLLOWUP:    1. 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 NO ANEMIA, NORMAL CREATININE,NORMAL CALCIUM, NO BONE PAIN NO ADENOPATHIES , NORMAL DIFFERENTIAL IN WBC.  2. LEUKOCYTOSIS AND THROMBOCYTOSIS.  3. GOITER VERY LIKELY HASHIMOTO'S THYROIDITIS.  4. SJOGREN'S SYNDROME.    HISTORY OF PRESENT ILLNESS:  On 04/16/2024, this 53-year-old female returns to the office for follow-up. She continues having issues pertinent to aches and pains in her muscles mostly in upper and lower extremities with no trigger points and no localized inflammation. Besides this, she has a good appetite. She has gained some weight. Bowel activity and urination are normal. She has previous history of myeloma that we treated her for, achieving a significant improvement in her monoclonal protein and on further evaluation at the [facility] was advised to place in observation. Physically besides this the patient has not had any fever or infection, no abnormal bleeding, and no new bone pain. She continues working at the [employer] with no limitations even though it is long hours. She has not had any other new health issues.        Past Medical History:   Diagnosis Date    Achilles tendonitis     Anemia     Anxiety     Arthritis     ankles, shoulders    Asthma     childhood    Benign essential hypertension     Cerebral aneurysm     GERD (gastroesophageal reflux disease)     Headache     History of anemia     History of E. coli septicemia     Multiple myeloma 2023    Rotator cuff syndrome     Seasonal allergies         Past Surgical History:   Procedure Laterality Date    ABSCESS DRAINAGE  2000    Renal abscess    CEREBRAL ANGIOGRAM  2020    CRANIOTOMY  2020    with angiogram for aneurysm    HYSTERECTOMY  2017    partial, both ovaries remain    ROTATOR CUFF REPAIR Right 2012    SHOULDER SURGERY          Current Outpatient Medications on File Prior to Visit   Medication Sig Dispense  "Refill    albuterol sulfate  (90 Base) MCG/ACT inhaler Ventolin HFA 90 mcg/actuation aerosol inhaler      Emgality 120 MG/ML auto-injector pen INJECT 1 ML SUBCUTANEOUSLY ONCE EVERY MONTH      hydroxychloroquine (PLAQUENIL) 200 MG tablet       levothyroxine (SYNTHROID, LEVOTHROID) 25 MCG tablet Take 1 tablet by mouth Daily.      losartan (COZAAR) 100 MG tablet TAKE 1 TABLET BY MOUTH ONCE DAILY WITH MEALS FOR 30 DAYS      metoprolol tartrate (LOPRESSOR) 50 MG tablet Take 1 tablet by mouth Every Morning.      Xiidra 5 % ophthalmic solution 1 drop.      [DISCONTINUED] ondansetron (ZOFRAN) 8 MG tablet Take 1 tablet by mouth 3 (Three) Times a Day As Needed for Nausea or Vomiting. (Patient not taking: Reported on 1/16/2024) 30 tablet 5    [DISCONTINUED] Rimegepant Sulfate (NURTEC) 75 MG tablet dispersible tablet Take 1 tablet by mouth Daily As Needed. (Patient not taking: Reported on 1/16/2024)       No current facility-administered medications on file prior to visit.        ALLERGIES:    Allergies   Allergen Reactions    Coconut Shortness Of Breath    Egg-Derived Products Diarrhea        Social History     Socioeconomic History    Marital status:     Number of children: 3   Tobacco Use    Smoking status: Never    Smokeless tobacco: Never   Substance and Sexual Activity    Alcohol use: Yes    Drug use: Never        Family History   Problem Relation Age of Onset    Hypertension Father     Multiple sclerosis Brother     Diabetes Brother     Colon cancer Paternal Aunt     Heart attack Maternal Grandmother     Heart disease Maternal Grandmother     Pancreatic cancer Maternal Grandfather     Colon cancer Paternal Grandmother     Heart attack Paternal Grandfather     Cancer Paternal Grandfather       Objective     Vitals:    04/16/24 1400   BP: 170/99   Pulse: 63   Resp: 16   Temp: 98.2 °F (36.8 °C)   TempSrc: Temporal   SpO2: 98%   Weight: 89.8 kg (198 lb)   Height: 165.1 cm (65\")   PainSc:   8   PainLoc: Leg "           4/16/2024     1:55 PM   Current Status   ECOG score 0       PHYSICAL EXAM:          GENERAL:  Well-developed, Patient  in no acute distress.   SKIN:  Warm, dry ,NO purpura ,no rash.  HEENT:  Pupils were equal and reactive to light and accomodation, conjunctivae noninjected,  normal visual acuity.   NECK:  Supple with good range of motion; no thyromegaly , no JVD or bruits,.No carotid artery pain, no carotid abnormal pulsation   LYMPHATICS:  No cervical, NO supraclavicular, NO axillary, NO inguinal adenopathies.  CARDIAC   normal rate , regular rhythm, without murmur,NO rubs NO S3 NO S4   LUNGS: normal breath sounds bilateral, no wheezing, NO rhonchi, NO crackles ,NO rubs.  VASCULAR VENOUS: no cyanosis, NO collateral circulation, NO varicosities, NO edema, NO palpable cords, NO pain,NO erythema, NO pigmentation of the skin.  ABDOMEN:  Soft, NO pain,no hepatomegaly, no splenomegaly,no masses, no ascites, no collateral circulation,no distention.  EXTREMITIES  AND SPINE:  No clubbing, no cyanosis ,no deformities , MILD DIFFUSE MUSCLE pain .No kyphosis,  no pain in spine, no pain in ribs , no pain in pelvic bone.  NEUROLOGICAL:  Patient was awake, alert, oriented to time, person and place.        RECENT LABS:  Lab Results   Component Value Date    WBC 12.01 (H) 04/16/2024    HGB 13.1 04/16/2024    HCT 40.1 04/16/2024    MCV 86.2 04/16/2024     (H) 04/16/2024      Lab Results   Component Value Date    GLUCOSE 100 (H) 04/16/2024    BUN 14 04/16/2024    CREATININE 1.06 (H) 04/16/2024    EGFR 63.7 04/16/2024    BCR 13.2 04/16/2024    K 3.8 04/16/2024    CO2 23.4 04/16/2024    CALCIUM 9.5 04/16/2024    PROTENTOTREF 7.5 01/16/2024    ALBUMIN 4.3 04/16/2024    BILITOT 0.2 04/16/2024    AST 28 04/16/2024    ALT 19 04/16/2024            Assessment & Plan     Diagnoses and all orders for this visit:    1. Monoclonal (M) protein disease, multiple 'M' protein (Primary)  -     CBC & Differential; Future  -      Comprehensive Metabolic Panel; Future  -     SUNNY,PE and FLC, Serum; Future           1.  Multiple Myeloma:    Referred 11/14/2022 for leukocytosis and thrombocytosis by rheumatology, Dr. Ayala.  Diagnosed with Sjogren's by Dr. Ayala.  History of anemia when she had a cerebral aneurysm clip done in 2019.  Patient found to have serum protein electrophoresis that disclosed a monoclonal protein   IgG monoclonal protein in the 2000 category, Bence-Moon protein in the urine, very small amount that is not quantifiable.  Recommend patient proceed with bone marrow testing.  1/24/2023 bone marrow biopsy  Bone marrow biopsy showing 18% plasma cells infiltrating in the bone marrow. Also absence of iron stores. The patient had no evidence of myelofibrosis, lymphoma, leukemia, or myelodysplasia.   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 Congo red stain was negative for amyloid deposition  Recommended treatment with combination of Darzalex once a week, Velcade subcutaneously once a week, 3 weeks out of 4, Revlimid 15 mg a day for 3 weeks out of a month and dexamethasone 20 mg once a week.  Plan to treat the patient for the next 4 months following her monoclonal protein and eventually referred to [facility] for consideration of high-dose chemotherapy and stem cell transplant.  No evidence of lytic lesions, therefore will not use Zometa or Xgeva at this time.  2/3/2023 cycle 1 Darzalex Faspro and Velcade.  02/10/2023 the patient was further reviewed the day that she comes for her 2nd Darzalex infusion and 2nd Velcade injection. So far the patient has not encountered any side effects of the medicines.  Seen 2/24/2023 due for cycle 1 day 22.  Patient started her Revlimid 15 mg daily for 3 out of 4 weeks last Wednesday.  So far she is tolerating all of her treatment fairly well without any significant side effects other than ongoing fatigue.  3/10/2023 here for cycle 2,  day 8 Velcade, Darzalex fast pro, continuing on Revlimid 15 mg daily for 3 weeks on, 1 week off as well as dexamethasone 20 mg weekly.  We will recheck paraprotein studies today.  On 03/17/2023,  She has had resolution of pain in her lower extremities.  Her monoclonal protein has dropped by half in only 1 month of therapy.  She questions referral to BMT, which will be made to Dr. Plasencia in May or June.  3/31/2023: C3D1 Darzalex and Velcade.  Continues Revlimid at home, currently on her second week on Revlimid.  Continues dexamethasone weekly.  Tolerating well.  4/7/2023 seen today for cycle 3, day 8.  She will receive Velcade only today (Darzalex which is every other week).  She will continue her Revlimid 15 mg daily for 21 out of 28 days, as well as dexamethasone 20 mg weekly.  Repeat paraprotein studies pending for today.  4/14/2023 due for cycle 3, day 15 Velcade continuing to complain of leg pain.  We reviewed M spike improved to 0.5,Free light chain kappa 22.2, kappa/lambda ratio 1.8.   4/21/2023 patient returns for cycle 3-day 22 for follow-up of her leg pain.  X-rays performed 4/19/2023 show bilateral knee degeneration however otherwise unremarkable.  Patient did see rheumatology this week and they made multiple medication changes suggestions for her primary care and have asked the patient to make an appointment with them.  We will go ahead and place a referral for cardiology evaluation as recommended by rheumatology.  Case discussed with Dr. Freedman today and we will proceed with bilateral lower extremity venous Doppler prior to her next visit.   Additional labs obtained including B12, folate, CK all unremarkable.  4/28/2023: Proceed with cycle 4-day 1 Darzalex and Velcade.  Bilateral Doppler negative for DVT.  Consult with cardiology scheduled 5/12/2023.  Continues with swelling/tightness to the bilateral lower extremities.  Saw her PCP who discontinued amlodipine and started losartan.  Scheduled to see  cardiology 5/12/2023.  She does feel the swelling/tightness is stable.  5/12/2023, evaluation by Dr. Freedman with recommendations to discontinue Velcade.  Continue Darzalex, weekly dexamethasone 20 mg and Revlimid 15 mg 21 out of 28 days.  She was referred to [facility] bone marrow transplant team  Evaluation by [facility] bone marrow transplant team 5/16/2023.  I have requested these records.  However, the patient reports they recommended stopping chemotherapy.  We will await Dr. Freedman to make this decision  Proceed today with darzalex which is continued every 2 weeks, continue current schedule of Revlimid 15 mg 21 out of 28 days   09/26/2023 the patient clinically looks stable. She has no peripheral adenopathy, hepatosplenomegaly, bone pain. Obviously, neuropathy. Her white count, hemoglobin and platelets are normal. White count differential is normal. Chemistry profile is normal. Monoclonal protein studies are pending. I advised the patient to remain in observation from this point of view and advised her to return to see us back in 4 months with repeat CBC, CMP, and serum protein immunoelectrophoresis. Find no need for radiological assessment at this time.  Returns 1/16/2024 Continuing in observation in regards to her myeloma.  Continues to report intermittent issues with pain although it is unchanged from her previous visit.  Repeat paraprotein studies from today are pending.  On 04/16/2024, the patient was further reviewed. She has not had any bone pain. All her pains are muscle related and is variable from time to time. She has no fever or infections. No abnormal bleeding. Her clinical exam is otherwise unremarkable. Her white count, hemoglobin and platelets are normal. White count differential is normal. Chemistry profile and monoclonal protein studies are pending.     From my point of view, I believe that we will give her a call in regard to her monoclonal protein studies but  otherwise she will remain in observation, returning to see us back in 4 months for further review. On that occasion, CBC, CMP, and serum protein electrophoresis will be performed.        2.  Nodular goiter: she has a normal TSH and a normal T4. I will let Beau Frye MD, the patient's Primary Physician address this issue eventually with an ultrasound and clinical examination. The patient has antithyroid antibody positivity and very likely in my opinion she probably has a component of Hashimoto's thyroiditis.   On 03/17/2023 her goiter is no different today than what it was during the original consultation. This will be watched in absence of any other intervention.  On 05/12/2023 we know that she has Hashimoto thyroiditis and her thyroid function has remained stable at this time.  09/26/2023 her goiter is no different today than what it has been before. This will be watched in absence of any other intervention.  On 04/16/2024, her multinodular goiter remains unchanged.        3.  Sjogren's syndrome with myopathy:  Followed by rheumatology  On 03/17/2023 the patient has minimal symptomatology pertinent to this at this time besides minimal dryness in her eyes and oral mucosas.  On 05/12/2023 continues having pain in the lower extremities. Sounds more neuropathic than anything else. Velcade discontinued at this time.   Velcade discontinued 2 weeks ago with ongoing pain.  She was encouraged to follow-up with rheumatology or neurosurgery to review previous MRIs of the spine which do indicate degenerative disc disease  Continues to follow with rheumatology.  On 04/16/2024, her Sjogren syndrome remains ongoing. Minimal dryness in her eyes. Mostly proper amount of saliva. No other GI manifestations.        4.  Skin rash-called 3/23/2023 to report skin rash to the bilateral cheeks, neck, spreading towards her shoulders.  She was advised to hold Bactrim.  She was also sent clindamycin gel which she started on Tuesday.  Skin  rash is now only present to the bilateral cheeks and much improved.  She will continue clindamycin gel until resolution.  She will continue to hold Bactrim.  Skin rash improved continuing on clindamycin gel.  Resolved.  On 04/16/2024, no rash of any nature.        PLAN:   On 04/16/2024, the patient will remain in observation from my point of view. She will be returning to see us back in 4 months with repeat CBC, CMP and serum protein immunoelectrophoresis. She will be called at home with the report of her monoclonal proteins. I have not prescribed any medicine, neither modified any other medicines that she takes on routine basis.            Estuardo Freedman MD  04/16/2024

## 2024-06-28 ENCOUNTER — LAB (OUTPATIENT)
Dept: LAB | Facility: HOSPITAL | Age: 54
End: 2024-06-28
Payer: COMMERCIAL

## 2024-06-28 ENCOUNTER — OFFICE VISIT (OUTPATIENT)
Dept: ONCOLOGY | Facility: CLINIC | Age: 54
End: 2024-06-28
Payer: COMMERCIAL

## 2024-06-28 VITALS
WEIGHT: 199 LBS | TEMPERATURE: 98.6 F | HEIGHT: 65 IN | BODY MASS INDEX: 33.15 KG/M2 | DIASTOLIC BLOOD PRESSURE: 82 MMHG | SYSTOLIC BLOOD PRESSURE: 152 MMHG | HEART RATE: 60 BPM | RESPIRATION RATE: 16 BRPM | OXYGEN SATURATION: 98 %

## 2024-06-28 DIAGNOSIS — C90.00 MULTIPLE MYELOMA NOT HAVING ACHIEVED REMISSION: ICD-10-CM

## 2024-06-28 DIAGNOSIS — D47.2 MONOCLONAL (M) PROTEIN DISEASE, MULTIPLE 'M' PROTEIN: Primary | ICD-10-CM

## 2024-06-28 DIAGNOSIS — D47.2 MONOCLONAL (M) PROTEIN DISEASE, MULTIPLE 'M' PROTEIN: ICD-10-CM

## 2024-06-28 DIAGNOSIS — M79.10 MYALGIA: ICD-10-CM

## 2024-06-28 LAB
ALBUMIN SERPL-MCNC: 4.2 G/DL (ref 3.5–5.2)
ALBUMIN/GLOB SERPL: 1.3 G/DL
ALP SERPL-CCNC: 76 U/L (ref 39–117)
ALT SERPL W P-5'-P-CCNC: 13 U/L (ref 1–33)
ANION GAP SERPL CALCULATED.3IONS-SCNC: 9.8 MMOL/L (ref 5–15)
AST SERPL-CCNC: 20 U/L (ref 1–32)
BASOPHILS # BLD AUTO: 0.05 10*3/MM3 (ref 0–0.2)
BASOPHILS NFR BLD AUTO: 0.5 % (ref 0–1.5)
BILIRUB SERPL-MCNC: 0.2 MG/DL (ref 0–1.2)
BUN SERPL-MCNC: 13 MG/DL (ref 6–20)
BUN/CREAT SERPL: 15.7 (ref 7–25)
CALCIUM SPEC-SCNC: 9.8 MG/DL (ref 8.6–10.5)
CHLORIDE SERPL-SCNC: 105 MMOL/L (ref 98–107)
CK SERPL-CCNC: 95 U/L (ref 20–180)
CO2 SERPL-SCNC: 23.2 MMOL/L (ref 22–29)
CREAT SERPL-MCNC: 0.83 MG/DL (ref 0.57–1)
DEPRECATED RDW RBC AUTO: 45.7 FL (ref 37–54)
EGFRCR SERPLBLD CKD-EPI 2021: 85.5 ML/MIN/1.73
EOSINOPHIL # BLD AUTO: 0.11 10*3/MM3 (ref 0–0.4)
EOSINOPHIL NFR BLD AUTO: 1 % (ref 0.3–6.2)
ERYTHROCYTE [DISTWIDTH] IN BLOOD BY AUTOMATED COUNT: 14.4 % (ref 12.3–15.4)
ERYTHROCYTE [SEDIMENTATION RATE] IN BLOOD: 12 MM/HR (ref 0–30)
GLOBULIN UR ELPH-MCNC: 3.2 GM/DL
GLUCOSE SERPL-MCNC: 93 MG/DL (ref 65–99)
HCT VFR BLD AUTO: 40 % (ref 34–46.6)
HGB BLD-MCNC: 13.3 G/DL (ref 12–15.9)
IMM GRANULOCYTES # BLD AUTO: 0.09 10*3/MM3 (ref 0–0.05)
IMM GRANULOCYTES NFR BLD AUTO: 0.8 % (ref 0–0.5)
LYMPHOCYTES # BLD AUTO: 2.91 10*3/MM3 (ref 0.7–3.1)
LYMPHOCYTES NFR BLD AUTO: 27.2 % (ref 19.6–45.3)
MCH RBC QN AUTO: 29 PG (ref 26.6–33)
MCHC RBC AUTO-ENTMCNC: 33.3 G/DL (ref 31.5–35.7)
MCV RBC AUTO: 87.3 FL (ref 79–97)
MONOCYTES # BLD AUTO: 0.93 10*3/MM3 (ref 0.1–0.9)
MONOCYTES NFR BLD AUTO: 8.7 % (ref 5–12)
NEUTROPHILS NFR BLD AUTO: 6.62 10*3/MM3 (ref 1.7–7)
NEUTROPHILS NFR BLD AUTO: 61.8 % (ref 42.7–76)
NRBC BLD AUTO-RTO: 0 /100 WBC (ref 0–0.2)
PLATELET # BLD AUTO: 467 10*3/MM3 (ref 140–450)
PMV BLD AUTO: 9.8 FL (ref 6–12)
POTASSIUM SERPL-SCNC: 5.1 MMOL/L (ref 3.5–5.2)
PROT SERPL-MCNC: 7.4 G/DL (ref 6–8.5)
RBC # BLD AUTO: 4.58 10*6/MM3 (ref 3.77–5.28)
SODIUM SERPL-SCNC: 138 MMOL/L (ref 136–145)
WBC NRBC COR # BLD AUTO: 10.71 10*3/MM3 (ref 3.4–10.8)

## 2024-06-28 PROCEDURE — 80053 COMPREHEN METABOLIC PANEL: CPT

## 2024-06-28 PROCEDURE — 85652 RBC SED RATE AUTOMATED: CPT | Performed by: NURSE PRACTITIONER

## 2024-06-28 PROCEDURE — 82550 ASSAY OF CK (CPK): CPT | Performed by: NURSE PRACTITIONER

## 2024-06-28 PROCEDURE — 85025 COMPLETE CBC W/AUTO DIFF WBC: CPT

## 2024-06-28 PROCEDURE — 36415 COLL VENOUS BLD VENIPUNCTURE: CPT

## 2024-06-28 NOTE — PROGRESS NOTES
Subjective      REASONS FOR FOLLOWUP:    1. 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 NO ANEMIA, NORMAL CREATININE,NORMAL CALCIUM, NO BONE PAIN NO ADENOPATHIES , NORMAL DIFFERENTIAL IN WBC.  2. LEUKOCYTOSIS AND THROMBOCYTOSIS.  3. GOITER VERY LIKELY HASHIMOTO'S THYROIDITIS.  4. SJOGREN'S SYNDROME.    HISTORY OF PRESENT ILLNESS:  Yudy Hinton is a 53-year-old female with the above-mentioned history who is seen 6/28/2024 with complaints of worsening pain, especially in her legs.  She states that she has been miserable the past couple of months.  She states that she has excruciating pain in her legs bilaterally.  She feels like her legs are swollen at the end of the day when she works.  She does wear compression socks to work.  She feels miserable, and has no energy.  This does limit her going to work at times.  She states that her arms also bother her, but they are nowhere near what her legs are at this time.  She is also complaining of pain in her shoulder blades.  She states from shoulder blade shoulder blade it feels like a Sarah Ann is selling her back.  She does have a history of fibromyalgia and is followed by Dr. Darrin Ayala with rheumatology.  She has not been seen by them recently.      Past Medical History:   Diagnosis Date    Achilles tendonitis     Anemia     Anxiety     Arthritis     ankles, shoulders    Asthma     childhood    Benign essential hypertension     Cerebral aneurysm     GERD (gastroesophageal reflux disease)     Headache     History of anemia     History of E. coli septicemia     Multiple myeloma 2023    Rotator cuff syndrome     Seasonal allergies         Past Surgical History:   Procedure Laterality Date    ABSCESS DRAINAGE  2000    Renal abscess    CEREBRAL ANGIOGRAM  2020    CRANIOTOMY  2020    with angiogram for aneurysm    HYSTERECTOMY  2017    partial, both ovaries remain    ROTATOR CUFF REPAIR Right 2012    SHOULDER SURGERY          Current Outpatient  "Medications on File Prior to Visit   Medication Sig Dispense Refill    albuterol sulfate  (90 Base) MCG/ACT inhaler Ventolin HFA 90 mcg/actuation aerosol inhaler      levothyroxine (SYNTHROID, LEVOTHROID) 25 MCG tablet Take 1 tablet by mouth Daily.      losartan (COZAAR) 100 MG tablet TAKE 1 TABLET BY MOUTH ONCE DAILY WITH MEALS FOR 30 DAYS      metoprolol tartrate (LOPRESSOR) 50 MG tablet Take 1 tablet by mouth Every Morning.      Xiidra 5 % ophthalmic solution 1 drop.       No current facility-administered medications on file prior to visit.        ALLERGIES:    Allergies   Allergen Reactions    Coconut Shortness Of Breath    Egg-Derived Products Diarrhea        Social History     Socioeconomic History    Marital status:     Number of children: 3   Tobacco Use    Smoking status: Never    Smokeless tobacco: Never   Substance and Sexual Activity    Alcohol use: Yes    Drug use: Never        Family History   Problem Relation Age of Onset    Hypertension Father     Multiple sclerosis Brother     Diabetes Brother     Colon cancer Paternal Aunt     Heart attack Maternal Grandmother     Heart disease Maternal Grandmother     Pancreatic cancer Maternal Grandfather     Colon cancer Paternal Grandmother     Heart attack Paternal Grandfather     Cancer Paternal Grandfather       Objective     Vitals:    06/28/24 1431   BP: 152/82   Pulse: 60   Resp: 16   Temp: 98.6 °F (37 °C)   TempSrc: Oral   SpO2: 98%   Weight: 90.3 kg (199 lb)   Height: 165.1 cm (65\")   PainSc:   7   PainLoc: Generalized  Comment: All over body           6/28/2024     2:31 PM   Current Status   ECOG score 0       Physical Exam  Vitals reviewed.   Constitutional:       General: She is not in acute distress.     Appearance: Normal appearance. She is well-developed.   HENT:      Head: Normocephalic and atraumatic.   Eyes:      Pupils: Pupils are equal, round, and reactive to light.   Cardiovascular:      Rate and Rhythm: Normal rate and " regular rhythm.      Heart sounds: Normal heart sounds. No murmur heard.  Pulmonary:      Effort: Pulmonary effort is normal. No respiratory distress.      Breath sounds: Normal breath sounds. No wheezing, rhonchi or rales.   Abdominal:      General: Bowel sounds are normal. There is no distension.      Palpations: Abdomen is soft.   Musculoskeletal:         General: Tenderness (generalized) present. No swelling. Normal range of motion.      Cervical back: Normal range of motion.   Skin:     General: Skin is warm and dry.      Findings: No rash.   Neurological:      Mental Status: She is alert and oriented to person, place, and time.       RECENT LABS:  Lab Results   Component Value Date    WBC 10.71 06/28/2024    HGB 13.3 06/28/2024    HCT 40.0 06/28/2024    MCV 87.3 06/28/2024     (H) 06/28/2024      Lab Results   Component Value Date    GLUCOSE 93 06/28/2024    BUN 13 06/28/2024    CREATININE 0.83 06/28/2024    EGFR 85.5 06/28/2024    BCR 15.7 06/28/2024    K 5.1 06/28/2024    CO2 23.2 06/28/2024    CALCIUM 9.8 06/28/2024    PROTENTOTREF 7.5 01/16/2024    ALBUMIN 4.2 06/28/2024    BILITOT 0.2 06/28/2024    AST 20 06/28/2024    ALT 13 06/28/2024            Assessment & Plan     Diagnoses and all orders for this visit:    1. Monoclonal (M) protein disease, multiple 'M' protein (Primary)  -     SUNNY, PE & Free LT Chains, Ser  -     CK  -     Sedimentation Rate    2. Myalgia  -     CK  -     Sedimentation Rate           1.  Multiple Myeloma:    Referred 11/14/2022 for leukocytosis and thrombocytosis by rheumatology, Dr. Ayala.  Diagnosed with Sjogren's by Dr. Ayala.  History of anemia when she had a cerebral aneurysm clip done in 2019.  Patient found to have serum protein electrophoresis that disclosed a monoclonal protein   IgG monoclonal protein in the 2000 category, Bence-Moon protein in the urine, very small amount that is not quantifiable.  Recommend patient proceed with bone marrow testing.  1/24/2023  bone marrow biopsy  Bone marrow biopsy showing 18% plasma cells infiltrating in the bone marrow. Also absence of iron stores. The patient had no evidence of myelofibrosis, lymphoma, leukemia, or myelodysplasia.   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 [place] red stain was negative for amyloid deposition  Recommended treatment with combination of Darzalex once a week, Velcade subcutaneously once a week, 3 weeks out of 4, Revlimid 15 mg a day for 3 weeks out of a month and dexamethasone 20 mg once a week.  Plan to treat the patient for the next 4 months following her monoclonal protein and eventually referred to [facility] for consideration of high-dose chemotherapy and stem cell transplant.  No evidence of lytic lesions, therefore will not use Zometa or Xgeva at this time.  2/3/2023 cycle 1 Darzalex Faspro and Velcade.  02/10/2023 the patient was further reviewed the day that she comes for her 2nd Darzalex infusion and 2nd Velcade injection. So far the patient has not encountered any side effects of the medicines.  Seen 2/24/2023 due for cycle 1 day 22.  Patient started her Revlimid 15 mg daily for 3 out of 4 weeks last Wednesday.  So far she is tolerating all of her treatment fairly well without any significant side effects other than ongoing fatigue.  3/10/2023 here for cycle 2, day 8 Velcade, Darzalex fast pro, continuing on Revlimid 15 mg daily for 3 weeks on, 1 week off as well as dexamethasone 20 mg weekly.  We will recheck paraprotein studies today.  On 03/17/2023,  She has had resolution of pain in her lower extremities.  Her monoclonal protein has dropped by half in only 1 month of therapy.  She questions referral to BMT, which will be made to Dr. Plasencia in May or June.  3/31/2023: C3D1 Darzalex and Velcade.  Continues Revlimid at home, currently on her second week on Revlimid.  Continues dexamethasone weekly.  Tolerating well.  4/7/2023 seen  today for cycle 3, day 8.  She will receive Velcade only today (Darzalex which is every other week).  She will continue her Revlimid 15 mg daily for 21 out of 28 days, as well as dexamethasone 20 mg weekly.  Repeat paraprotein studies pending for today.  4/14/2023 due for cycle 3, day 15 Velcade continuing to complain of leg pain.  We reviewed M spike improved to 0.5,Free light chain kappa 22.2, kappa/lambda ratio 1.8.   4/21/2023 patient returns for cycle 3-day 22 for follow-up of her leg pain.  X-rays performed 4/19/2023 show bilateral knee degeneration however otherwise unremarkable.  Patient did see rheumatology this week and they made multiple medication changes suggestions for her primary care and have asked the patient to make an appointment with them.  We will go ahead and place a referral for cardiology evaluation as recommended by rheumatology.  Case discussed with Dr. Freedman today and we will proceed with bilateral lower extremity venous Doppler prior to her next visit.   Additional labs obtained including B12, folate, CK all unremarkable.  4/28/2023: Proceed with cycle 4-day 1 Darzalex and Velcade.  Bilateral Doppler negative for DVT.  Consult with cardiology scheduled 5/12/2023.  Continues with swelling/tightness to the bilateral lower extremities.  Saw her PCP who discontinued amlodipine and started losartan.  Scheduled to see cardiology 5/12/2023.  She does feel the swelling/tightness is stable.  5/12/2023, evaluation by Dr. Freedman with recommendations to discontinue Velcade.  Continue Darzalex, weekly dexamethasone 20 mg and Revlimid 15 mg 21 out of 28 days.  She was referred to [facility] bone marrow transplant team  Evaluation by [facility] bone marrow transplant team 5/16/2023.  I have requested these records.  However, the patient reports they recommended stopping chemotherapy.  We will await Dr. Freedman to make this decision  Proceed today with darzalex which is  continued every 2 weeks, continue current schedule of Revlimid 15 mg 21 out of 28 days   09/26/2023 the patient clinically looks stable. She has no peripheral adenopathy, hepatosplenomegaly, bone pain. Obviously, neuropathy. Her white count, hemoglobin and platelets are normal. White count differential is normal. Chemistry profile is normal. Monoclonal protein studies are pending. I advised the patient to remain in observation from this point of view and advised her to return to see us back in 4 months with repeat CBC, CMP, and serum protein immunoelectrophoresis. Find no need for radiological assessment at this time.  Returns 1/16/2024 Continuing in observation in regards to her myeloma.  Continues to report intermittent issues with pain although it is unchanged from her previous visit.  Repeat paraprotein studies from today are pending.  04/16/2024, the patient was further reviewed. She has not had any bone pain. All her pains are muscle related and is variable from time to time. She has no fever or infections. No abnormal bleeding. Her clinical exam is otherwise unremarkable. Her white count, hemoglobin and platelets are normal. White count differential is normal. Chemistry profile and monoclonal protein studies are pending.   Patient seen back in the office on 6/28/2024 with complaints of worsening pain especially in her lower extremities, bilaterally.  This has been worsening over the past few months.  The pain is excruciating at times.  She is complaining of worsening fatigue and weakness.  She also has pain in her upper extremities and back, but the lower extremity pain is the most bothersome.  Repeat paraprotein studies for today are pending.  I have discussed with Dr. Freedman.  We will also check a CPK, and sed rate.  Discussed patient will likely need to follow-up with her rheumatologist, pending protein studies.      2.  Nodular goiter: she has a normal TSH and a normal T4. I will let Beau Frye MD, the  patient's Primary Physician address this issue eventually with an ultrasound and clinical examination. The patient has antithyroid antibody positivity and very likely in my opinion she probably has a component of Hashimoto's thyroiditis.   On 03/17/2023 her goiter is no different today than what it was during the original consultation. This will be watched in absence of any other intervention.  On 05/12/2023 we know that she has Hashimoto thyroiditis and her thyroid function has remained stable at this time.  09/26/2023 her goiter is no different today than what it has been before. This will be watched in absence of any other intervention.  On 04/16/2024, her multinodular goiter remains unchanged.        3.  Sjogren's syndrome with myopathy:  Followed by rheumatology  On 03/17/2023 the patient has minimal symptomatology pertinent to this at this time besides minimal dryness in her eyes and oral mucosas.  On 05/12/2023 continues having pain in the lower extremities. Sounds more neuropathic than anything else. Velcade discontinued at this time.   Velcade discontinued 2 weeks ago with ongoing pain.  She was encouraged to follow-up with rheumatology or neurosurgery to review previous MRIs of the spine which do indicate degenerative disc disease  Continues to follow with rheumatology.  On 04/16/2024, her Sjogren syndrome remains ongoing. Minimal dryness in her eyes. Mostly proper amount of saliva. No other GI manifestations.        4.  Skin rash-called 3/23/2023 to report skin rash to the bilateral cheeks, neck, spreading towards her shoulders.  She was advised to hold Bactrim.  She was also sent clindamycin gel which she started on Tuesday.  Skin rash is now only present to the bilateral cheeks and much improved.  She will continue clindamycin gel until resolution.  She will continue to hold Bactrim.  Skin rash improved continuing on clindamycin gel.  Resolved.  On 04/16/2024, no rash of any nature.        PLAN:   Will  check repeat serum paraprotein studies, as well as a CPK, and sed rate.  Patient will follow-up as scheduled in August with Dr. Riggins to establish care.  Will call her once we have the labs from today.  She will also continue follow-up with Dr. Ayala her rheumatologist.  Call/ return sooner should the patient develop any new concerns or problems.          Mayra Muñoz, NICOLA  06/28/2024

## 2024-07-01 LAB
ALBUMIN SERPL ELPH-MCNC: 3.7 G/DL (ref 2.9–4.4)
ALBUMIN/GLOB SERPL: 1 {RATIO} (ref 0.7–1.7)
ALPHA1 GLOB SERPL ELPH-MCNC: 0.3 G/DL (ref 0–0.4)
ALPHA2 GLOB SERPL ELPH-MCNC: 0.9 G/DL (ref 0.4–1)
B-GLOBULIN SERPL ELPH-MCNC: 1.2 G/DL (ref 0.7–1.3)
GAMMA GLOB SERPL ELPH-MCNC: 1.5 G/DL (ref 0.4–1.8)
GLOBULIN SER-MCNC: 3.9 G/DL (ref 2.2–3.9)
IGA SERPL-MCNC: 181 MG/DL (ref 87–352)
IGG SERPL-MCNC: 1519 MG/DL (ref 586–1602)
IGM SERPL-MCNC: 82 MG/DL (ref 26–217)
INTERPRETATION SERPL IEP-IMP: ABNORMAL
KAPPA LC FREE SER-MCNC: 22.8 MG/L (ref 3.3–19.4)
KAPPA LC FREE/LAMBDA FREE SER: 1.55 {RATIO} (ref 0.26–1.65)
LABORATORY COMMENT REPORT: ABNORMAL
LAMBDA LC FREE SERPL-MCNC: 14.7 MG/L (ref 5.7–26.3)
M PROTEIN SERPL ELPH-MCNC: 0.5 G/DL
PROT SERPL-MCNC: 7.6 G/DL (ref 6–8.5)

## 2024-07-02 ENCOUNTER — TELEPHONE (OUTPATIENT)
Dept: ONCOLOGY | Facility: CLINIC | Age: 54
End: 2024-07-02
Payer: COMMERCIAL

## 2024-07-02 NOTE — TELEPHONE ENCOUNTER
----- Message from Estuardo Freedman sent at 7/2/2024  7:52 AM EDT -----  Call her m protein has not change great

## 2024-07-20 NOTE — SERVICEHPINOTES
Session ID: [id number]  Language: Yakut   ID: #[id number]   Name: Rosana WOO BARNESER  49  female  1970   presents for a bypass screening colonoscopy at [facility].

## 2024-08-01 DIAGNOSIS — D47.2 MONOCLONAL (M) PROTEIN DISEASE, MULTIPLE 'M' PROTEIN: Primary | ICD-10-CM

## 2024-08-01 DIAGNOSIS — C90.00 MULTIPLE MYELOMA NOT HAVING ACHIEVED REMISSION: ICD-10-CM

## 2024-08-12 ENCOUNTER — LAB (OUTPATIENT)
Dept: LAB | Facility: HOSPITAL | Age: 54
End: 2024-08-12
Payer: COMMERCIAL

## 2024-08-12 ENCOUNTER — OFFICE VISIT (OUTPATIENT)
Dept: ONCOLOGY | Facility: CLINIC | Age: 54
End: 2024-08-12
Payer: COMMERCIAL

## 2024-08-12 ENCOUNTER — INFUSION (OUTPATIENT)
Dept: ONCOLOGY | Facility: HOSPITAL | Age: 54
End: 2024-08-12
Payer: COMMERCIAL

## 2024-08-12 VITALS
BODY MASS INDEX: 33.15 KG/M2 | WEIGHT: 199 LBS | RESPIRATION RATE: 17 BRPM | HEART RATE: 87 BPM | DIASTOLIC BLOOD PRESSURE: 105 MMHG | HEIGHT: 65 IN | TEMPERATURE: 98.3 F | OXYGEN SATURATION: 98 % | SYSTOLIC BLOOD PRESSURE: 176 MMHG

## 2024-08-12 VITALS — DIASTOLIC BLOOD PRESSURE: 105 MMHG | SYSTOLIC BLOOD PRESSURE: 176 MMHG

## 2024-08-12 DIAGNOSIS — D47.2 SMOLDERING MULTIPLE MYELOMA: Primary | ICD-10-CM

## 2024-08-12 DIAGNOSIS — D47.2 MONOCLONAL (M) PROTEIN DISEASE, MULTIPLE 'M' PROTEIN: ICD-10-CM

## 2024-08-12 DIAGNOSIS — M35.03 SJOGREN'S SYNDROME WITH MYOPATHY: ICD-10-CM

## 2024-08-12 DIAGNOSIS — Z86.39 HISTORY OF IRON DEFICIENCY: ICD-10-CM

## 2024-08-12 DIAGNOSIS — C90.00 MULTIPLE MYELOMA NOT HAVING ACHIEVED REMISSION: ICD-10-CM

## 2024-08-12 DIAGNOSIS — D75.839 THROMBOCYTOSIS: ICD-10-CM

## 2024-08-12 LAB
ALBUMIN SERPL-MCNC: 4.3 G/DL (ref 3.5–5.2)
ALBUMIN/GLOB SERPL: 1.2 G/DL
ALP SERPL-CCNC: 86 U/L (ref 39–117)
ALT SERPL W P-5'-P-CCNC: 10 U/L (ref 1–33)
ANION GAP SERPL CALCULATED.3IONS-SCNC: 9 MMOL/L (ref 5–15)
AST SERPL-CCNC: 19 U/L (ref 1–32)
BASOPHILS # BLD AUTO: 0.07 10*3/MM3 (ref 0–0.2)
BASOPHILS NFR BLD AUTO: 0.6 % (ref 0–1.5)
BILIRUB SERPL-MCNC: 0.2 MG/DL (ref 0–1.2)
BUN SERPL-MCNC: 14 MG/DL (ref 6–20)
BUN/CREAT SERPL: 17.9 (ref 7–25)
CALCIUM SPEC-SCNC: 9.6 MG/DL (ref 8.6–10.5)
CHLORIDE SERPL-SCNC: 102 MMOL/L (ref 98–107)
CO2 SERPL-SCNC: 26 MMOL/L (ref 22–29)
CREAT SERPL-MCNC: 0.78 MG/DL (ref 0.57–1)
DEPRECATED RDW RBC AUTO: 44.3 FL (ref 37–54)
EGFRCR SERPLBLD CKD-EPI 2021: 92.1 ML/MIN/1.73
EOSINOPHIL # BLD AUTO: 0.16 10*3/MM3 (ref 0–0.4)
EOSINOPHIL NFR BLD AUTO: 1.5 % (ref 0.3–6.2)
ERYTHROCYTE [DISTWIDTH] IN BLOOD BY AUTOMATED COUNT: 13.7 % (ref 12.3–15.4)
FERRITIN SERPL-MCNC: 35.2 NG/ML (ref 13–150)
GLOBULIN UR ELPH-MCNC: 3.7 GM/DL
GLUCOSE SERPL-MCNC: 96 MG/DL (ref 65–99)
HCT VFR BLD AUTO: 40.6 % (ref 34–46.6)
HGB BLD-MCNC: 13.1 G/DL (ref 12–15.9)
IMM GRANULOCYTES # BLD AUTO: 0.06 10*3/MM3 (ref 0–0.05)
IMM GRANULOCYTES NFR BLD AUTO: 0.6 % (ref 0–0.5)
IRON 24H UR-MRATE: 63 MCG/DL (ref 37–145)
IRON SATN MFR SERPL: 17 % (ref 20–50)
LYMPHOCYTES # BLD AUTO: 2.95 10*3/MM3 (ref 0.7–3.1)
LYMPHOCYTES NFR BLD AUTO: 27.1 % (ref 19.6–45.3)
MCH RBC QN AUTO: 28.4 PG (ref 26.6–33)
MCHC RBC AUTO-ENTMCNC: 32.3 G/DL (ref 31.5–35.7)
MCV RBC AUTO: 88.1 FL (ref 79–97)
MONOCYTES # BLD AUTO: 0.85 10*3/MM3 (ref 0.1–0.9)
MONOCYTES NFR BLD AUTO: 7.8 % (ref 5–12)
NEUTROPHILS NFR BLD AUTO: 6.81 10*3/MM3 (ref 1.7–7)
NEUTROPHILS NFR BLD AUTO: 62.4 % (ref 42.7–76)
NRBC BLD AUTO-RTO: 0 /100 WBC (ref 0–0.2)
PLATELET # BLD AUTO: 502 10*3/MM3 (ref 140–450)
PMV BLD AUTO: 9.8 FL (ref 6–12)
POTASSIUM SERPL-SCNC: 4.1 MMOL/L (ref 3.5–5.2)
PROT SERPL-MCNC: 8 G/DL (ref 6–8.5)
RBC # BLD AUTO: 4.61 10*6/MM3 (ref 3.77–5.28)
SODIUM SERPL-SCNC: 137 MMOL/L (ref 136–145)
TIBC SERPL-MCNC: 381 MCG/DL (ref 298–536)
TRANSFERRIN SERPL-MCNC: 256 MG/DL (ref 200–360)
WBC NRBC COR # BLD AUTO: 10.9 10*3/MM3 (ref 3.4–10.8)

## 2024-08-12 PROCEDURE — 82728 ASSAY OF FERRITIN: CPT | Performed by: INTERNAL MEDICINE

## 2024-08-12 PROCEDURE — 99215 OFFICE O/P EST HI 40 MIN: CPT | Performed by: INTERNAL MEDICINE

## 2024-08-12 PROCEDURE — 85025 COMPLETE CBC W/AUTO DIFF WBC: CPT

## 2024-08-12 PROCEDURE — 80053 COMPREHEN METABOLIC PANEL: CPT

## 2024-08-12 PROCEDURE — 36415 COLL VENOUS BLD VENIPUNCTURE: CPT

## 2024-08-12 PROCEDURE — 84466 ASSAY OF TRANSFERRIN: CPT | Performed by: INTERNAL MEDICINE

## 2024-08-12 PROCEDURE — 83540 ASSAY OF IRON: CPT | Performed by: INTERNAL MEDICINE

## 2024-08-12 RX ORDER — AMLODIPINE BESYLATE 5 MG/1
5 TABLET ORAL DAILY
Qty: 30 TABLET | Refills: 3 | Status: SHIPPED | OUTPATIENT
Start: 2024-08-12

## 2024-08-12 RX ORDER — CLONIDINE HYDROCHLORIDE 0.1 MG/1
0.1 TABLET ORAL ONCE
Status: COMPLETED | OUTPATIENT
Start: 2024-08-12 | End: 2024-08-12

## 2024-08-12 RX ORDER — ONDANSETRON 4 MG/1
1 TABLET, FILM COATED ORAL EVERY 8 HOURS PRN
COMMUNITY

## 2024-08-12 RX ADMIN — CLONIDINE HYDROCHLORIDE 0.1 MG: 0.1 TABLET ORAL at 14:46

## 2024-08-12 NOTE — PROGRESS NOTES
Subjective      REASONS FOR FOLLOWUP:    1. 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 NO ANEMIA, NORMAL CREATININE,NORMAL CALCIUM, NO BONE PAIN NO ADENOPATHIES , NORMAL DIFFERENTIAL IN WBC.  2. LEUKOCYTOSIS AND THROMBOCYTOSIS.  3. GOITER VERY LIKELY HASHIMOTO'S THYROIDITIS.  4. SJOGREN'S SYNDROME.    HISTORY OF PRESENT ILLNESS:    History of Present Illness  The patient is a 53 years of female who presents for a 6-week follow-up evaluation. This patient usually follows by my colleague of Dr. Freedman who has retired recently. I am taking over the patient's care today as requested by Dr. Freedman.    She reports constant discomfort, which she attributes to her fibromyalgia. Her fibromyalgia symptoms improved during chemotherapy, but returned approximately 2 months later. She often feels cold.      She also has high blood pressure at 176/105. She takes losartan and metoprolol every morning for her blood pressure. She does not have a blood pressure monitor at home.     Her primary care physician advised her to walk regularly, but this has not improved her condition. She has reduced her intake of wheat and gluten. She reports swelling in her legs and fingers.     Her rheumatologist has advised her to follow up every 6 months to monitor her progress.    She was previously diagnosed with iron deficiency and was prescribed iron supplements, which caused constipation. She has been off iron pills for 3 years.  She had a partial hysterectomy in 2017 because of heavy menses, cramping, and fibroids.    Results  Laboratory Studies 6/28/2024  Monoclonal serum IgG kappa M spike is 0.5 g/dL. Normal serum IgG 1519, free IgA 181, IgM 82, free kappa chain 22.8, free lambda chain 14.7, and kappa/lambda ratio 1.55.     Labs today on 8/12/2024 normal hemoglobin 13.1, MCV 88.1, platelets 502,000, WBC 10,900, neutrophils 6810, lymphocytes 2950.  Pending CMP, iron studies and also serum paraprotein  study.        Past Medical History:   Diagnosis Date    Achilles tendonitis     Anemia     Anxiety     Arthritis     ankles, shoulders    Asthma     childhood    Benign essential hypertension     Cerebral aneurysm     GERD (gastroesophageal reflux disease)     Headache     History of anemia     History of E. coli septicemia     Multiple myeloma 2023    Rotator cuff syndrome     Seasonal allergies         Past Surgical History:   Procedure Laterality Date    ABSCESS DRAINAGE  2000    Renal abscess    CEREBRAL ANGIOGRAM  2020    CRANIOTOMY  2020    with angiogram for aneurysm    HYSTERECTOMY  2017    partial, both ovaries remain    ROTATOR CUFF REPAIR Right 2012    SHOULDER SURGERY          Current Outpatient Medications on File Prior to Visit   Medication Sig Dispense Refill    albuterol sulfate  (90 Base) MCG/ACT inhaler Ventolin HFA 90 mcg/actuation aerosol inhaler      levothyroxine (SYNTHROID, LEVOTHROID) 25 MCG tablet Take 1 tablet by mouth Daily.      losartan (COZAAR) 100 MG tablet TAKE 1 TABLET BY MOUTH ONCE DAILY WITH MEALS FOR 30 DAYS      metoprolol tartrate (LOPRESSOR) 50 MG tablet Take 1 tablet by mouth Every Morning.      ondansetron (ZOFRAN) 4 MG tablet Take 1 tablet by mouth Every 8 (Eight) Hours As Needed.      Xiidra 5 % ophthalmic solution 1 drop.       No current facility-administered medications on file prior to visit.        ALLERGIES:    Allergies   Allergen Reactions    Coconut Shortness Of Breath    Egg-Derived Products Diarrhea        Social History     Socioeconomic History    Marital status:     Number of children: 3   Tobacco Use    Smoking status: Never    Smokeless tobacco: Never   Vaping Use    Vaping status: Never Used   Substance and Sexual Activity    Alcohol use: Yes    Drug use: Never        Family History   Problem Relation Age of Onset    Hypertension Father     Multiple sclerosis Brother     Diabetes Brother     Colon cancer Paternal Aunt     Heart attack  "Maternal Grandmother     Heart disease Maternal Grandmother     Pancreatic cancer Maternal Grandfather     Colon cancer Paternal Grandmother     Heart attack Paternal Grandfather     Cancer Paternal Grandfather       Objective     Vitals:    08/12/24 1348   BP: (!) 176/105   Pulse: 87   Resp: 17   Temp: 98.3 °F (36.8 °C)   TempSrc: Skin   SpO2: 98%   Weight: 90.3 kg (199 lb)   Height: 165.1 cm (65\")           8/12/2024     1:49 PM   Current Status   ECOG score 0     Physical Exam  Vital Signs  Vitals show a blood pressure of 176/105, heart rate at 87, temperature at 98.3, and oxygen saturation at 98 percent on room air.    Physical Exam  Vitals reviewed.   Constitutional:       General: She is not in acute distress.     Appearance: Normal appearance. She is well-developed.   HENT:      Head: Normocephalic and atraumatic.      Nose: Nose normal.   Eyes:      Conjunctiva/sclera: Conjunctivae normal.   Cardiovascular:      Rate and Rhythm: Normal rate and regular rhythm.      Heart sounds: Normal heart sounds.   Pulmonary:      Effort: Pulmonary effort is normal. No respiratory distress.      Breath sounds: Normal breath sounds. No wheezing.   Abdominal:      General: Bowel sounds are normal. There is no distension.      Palpations: Abdomen is soft.      Tenderness: There is no abdominal tenderness.   Musculoskeletal:         General: Tenderness (generalized) present. No swelling.   Lymphadenopathy:      Cervical: No cervical adenopathy.   Skin:     General: Skin is warm.      Findings: No rash.   Neurological:      Mental Status: She is alert and oriented to person, place, and time.       RECENT LABS:  Lab Results   Component Value Date    WBC 10.90 (H) 08/12/2024    HGB 13.1 08/12/2024    HCT 40.6 08/12/2024    MCV 88.1 08/12/2024     (H) 08/12/2024      Lab Results   Component Value Date    GLUCOSE 96 08/12/2024    BUN 14 08/12/2024    CREATININE 0.78 08/12/2024     08/12/2024    K 4.1 08/12/2024    CL " 102 08/12/2024    CALCIUM 9.6 08/12/2024    PROTEINTOT 8.0 08/12/2024    ALBUMIN 4.3 08/12/2024    ALBUMIN 3.8 08/12/2024    ALT 10 08/12/2024    AST 19 08/12/2024    ALKPHOS 86 08/12/2024    BILITOT 0.2 08/12/2024    GLOB 3.7 08/12/2024    AGRATIO 1.2 08/12/2024    BCR 17.9 08/12/2024    ANIONGAP 9.0 08/12/2024    EGFR 92.1 08/12/2024             Assessment & Plan     Assessment & Plan       1.  Multiple Myeloma:    Referred 11/14/2022 for leukocytosis and thrombocytosis by rheumatology, Dr. Ayala.  Diagnosed with Sjogren's by Dr. Ayala.  History of anemia when she had a cerebral aneurysm clip done in 2019.  Patient found to have serum protein electrophoresis that disclosed a monoclonal protein   IgG monoclonal protein in the 2000 category, Bence-Moon protein in the urine, very small amount that is not quantifiable.  Recommend patient proceed with bone marrow testing.  1/24/2023 bone marrow biopsy  Bone marrow biopsy showing 18% plasma cells infiltrating in the bone marrow. Also absence of iron stores. The patient had no evidence of myelofibrosis, lymphoma, leukemia, or myelodysplasia.   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 [place] red stain was negative for amyloid deposition  Recommended treatment with combination of Darzalex once a week, Velcade subcutaneously once a week, 3 weeks out of 4, Revlimid 15 mg a day for 3 weeks out of a month and dexamethasone 20 mg once a week.  Plan to treat the patient for the next 4 months following her monoclonal protein and eventually referred to [facility] for consideration of high-dose chemotherapy and stem cell transplant.  No evidence of lytic lesions, therefore will not use Zometa or Xgeva at this time.  2/3/2023 cycle 1 Darzalex Faspro and Velcade.  02/10/2023 the patient was further reviewed the day that she comes for her 2nd Darzalex infusion and 2nd Velcade injection. So far the patient has not  encountered any side effects of the medicines.  Seen 2/24/2023 due for cycle 1 day 22.  Patient started her Revlimid 15 mg daily for 3 out of 4 weeks last Wednesday.  So far she is tolerating all of her treatment fairly well without any significant side effects other than ongoing fatigue.  3/10/2023 here for cycle 2, day 8 Velcade, Darzalex fast pro, continuing on Revlimid 15 mg daily for 3 weeks on, 1 week off as well as dexamethasone 20 mg weekly.  We will recheck paraprotein studies today.  On 03/17/2023,  She has had resolution of pain in her lower extremities.  Her monoclonal protein has dropped by half in only 1 month of therapy.  She questions referral to BMT, which will be made to Dr. Plasencia in May or June.  3/31/2023: C3D1 Darzalex and Velcade.  Continues Revlimid at home, currently on her second week on Revlimid.  Continues dexamethasone weekly.  Tolerating well.  4/7/2023 seen today for cycle 3, day 8.  She will receive Velcade only today (Darzalex which is every other week).  She will continue her Revlimid 15 mg daily for 21 out of 28 days, as well as dexamethasone 20 mg weekly.  Repeat paraprotein studies pending for today.  4/14/2023 due for cycle 3, day 15 Velcade continuing to complain of leg pain.  We reviewed M spike improved to 0.5,Free light chain kappa 22.2, kappa/lambda ratio 1.8.   4/21/2023 patient returns for cycle 3-day 22 for follow-up of her leg pain.  X-rays performed 4/19/2023 show bilateral knee degeneration however otherwise unremarkable.  Patient did see rheumatology this week and they made multiple medication changes suggestions for her primary care and have asked the patient to make an appointment with them.  We will go ahead and place a referral for cardiology evaluation as recommended by rheumatology.  Case discussed with Dr. Freedman today and we will proceed with bilateral lower extremity venous Doppler prior to her next visit.   Additional labs obtained including B12, folate, CK  all unremarkable.  4/28/2023: Proceed with cycle 4-day 1 Darzalex and Velcade.  Bilateral Doppler negative for DVT.  Consult with cardiology scheduled 5/12/2023.  Continues with swelling/tightness to the bilateral lower extremities.  Saw her PCP who discontinued amlodipine and started losartan.  Scheduled to see cardiology 5/12/2023.  She does feel the swelling/tightness is stable.  5/12/2023, evaluation by Dr. Freedman with recommendations to discontinue Velcade.  Continue Darzalex, weekly dexamethasone 20 mg and Revlimid 15 mg 21 out of 28 days.  She was referred to [facility] bone marrow transplant team  Evaluation by [facility] bone marrow transplant team 5/16/2023.  However, the patient reports they recommended stopping chemotherapy.    Last chemotherapy was on 5/26/2023 for cycle 5-day 1 and chemotherapy was stopped.   09/26/2023 the patient clinically looks stable. She has no peripheral adenopathy, hepatosplenomegaly, bone pain. Obviously, neuropathy. Her white count, hemoglobin and platelets are normal. White count differential is normal. Chemistry profile is normal. Monoclonal protein studies are pending. I advised the patient to remain in observation from this point of view and advised her to return to see us back in 4 months with repeat CBC, CMP, and serum protein immunoelectrophoresis. Find no need for radiological assessment at this time.  Returns 1/16/2024 Continuing in observation in regards to her myeloma.  Continues to report intermittent issues with pain although it is unchanged from her previous visit.  Repeat paraprotein studies from today are pending.  04/16/2024, the patient was further reviewed. She has not had any bone pain. All her pains are muscle related and is variable from time to time. She has no fever or infections. No abnormal bleeding. Her clinical exam is otherwise unremarkable. Her white count, hemoglobin and platelets are normal. White count differential  is normal. Chemistry profile and monoclonal protein studies are pending.   Patient seen back in the office on 6/28/2024 with complaints of worsening pain especially in her lower extremities, bilaterally.  This has been worsening over the past few months.  The pain is excruciating at times.  She is complaining of worsening fatigue and weakness.  She also has pain in her upper extremities and back, but the lower extremity pain is the most bothersome.  Repeat paraprotein studies for today are pending.  I have discussed with Dr. Freedman.  We will also check a CPK, and sed rate.  Discussed patient will likely need to follow-up with her rheumatologist.  On 6/28/2024 Monoclonal serum IgG kappa M spike is 0.5 g/dL. Normal serum IgG 1519, free IgA 181, IgM 82, free kappa chain 22.8, free lambda chain 14.7, and kappa/lambda ratio 1.55.   Evaluated patient today on 8/12/2024 pending serum paraprotein study.  She has normal renal function, and calcium 9.6, hemoglobin 13.1.       2.  Nodular goiter: she has a normal TSH and a normal T4. I will let Beau Frye MD, the patient's Primary Physician address this issue eventually with an ultrasound and clinical examination. The patient has antithyroid antibody positivity and very likely in my opinion she probably has a component of Hashimoto's thyroiditis.   On 03/17/2023 her goiter is no different today than what it was during the original consultation. This will be watched in absence of any other intervention.  On 05/12/2023 we know that she has Hashimoto thyroiditis and her thyroid function has remained stable at this time.  09/26/2023 her goiter is no different today than what it has been before. This will be watched in absence of any other intervention.  On 04/16/2024, her multinodular goiter remains unchanged.  8/12/2024 stable condition.    3.  Sjogren's syndrome with myopathy:  Followed by rheumatology  On 03/17/2023 the patient has minimal symptomatology pertinent to this at  this time besides minimal dryness in her eyes and oral mucosas.  On 05/12/2023 continues having pain in the lower extremities. Sounds more neuropathic than anything else. Velcade discontinued at this time.   Velcade discontinued 2 weeks ago with ongoing pain.  She was encouraged to follow-up with rheumatology or neurosurgery to review previous MRIs of the spine which do indicate degenerative disc disease  Continues to follow with rheumatology.  On 04/16/2024, her Sjogren syndrome remains ongoing. Minimal dryness in her eyes. Mostly proper amount of saliva. No other GI manifestations.  8/12/2024 patient will continue follow-up by rheumatology service.      4.  Skin rash-called 3/23/2023 to report skin rash to the bilateral cheeks, neck, spreading towards her shoulders.  She was advised to hold Bactrim.  She was also sent clindamycin gel which she started on Tuesday.  Skin rash is now only present to the bilateral cheeks and much improved.  She will continue clindamycin gel until resolution.  She will continue to hold Bactrim.  Skin rash improved continuing on clindamycin gel.  Resolved.  On 04/16/2024, no rash of any nature.  8/12/2024 no skin rashes.      5. Hypertension.  Her blood pressure is elevated today 176/105.  Patient already has been taking Lopressor 50 mg daily and losartan 100 mg daily.    I will start amlodipine 5 mg daily.  She was advised to monitor her blood pressure at home.    6.  Chronic fatigue and history of iron deficiency.  History of iron deficiency.  Patient continues having severe fatigue.  Today 8/12/2024, she is not anemic Hb 13.1 MCV 88.1, but has a history of iron deficiency. Her iron levels will be checked today.    7.  Mild thrombocytosis.  Lab study today on 8/12/2024 reported platelets 502,000.  No signs of infection.  Suspected this is reactive.  May be related to iron deficiency, and could be also related to her rheumatoid disease.  Continue to monitor.    PLAN:   Pending serum  paraprotein studies today.  Checking iron studies with ferritin and iron profile.  Start Norvasc 5 mg daily E scribed to her pharmacy.  She will continue losartan and Lopressor, followed by PCP for management of hypertension.  I will see patient in 4 months for reevaluation, with CBC, CMP, LDH beta-2 myoglobin in the serum paraprotein studies.  She will also continue follow-up with Dr. Ayala her rheumatologist.  Call/ return sooner should the patient develop any new concerns or problems.    This patient is complete new to me for all the problems listed above.  This is a first time of seeing this patient, she used to followed by my colleague Dr. Freedman who has retired.    I spent 50 minutes caring for Yudy on this date of service. This time includes time spent by me in the following activities: preparing for the visit, reviewing tests, obtaining and/or reviewing a separately obtained history, performing a medically appropriate examination and/or evaluation, counseling and educating the patient/family/caregiver, ordering medications, tests, or procedures, referring and communicating with other health care professionals, documenting information in the medical record, independently interpreting results and communicating that information with the patient/family/caregiver and care coordination         Aung Riggins MD PhD  08/12/2024      Addendum:    Component      Latest Ref Rng 1/16/2024 6/28/2024 8/12/2024   IgG      586 - 1602 mg/dL 1413  1519  1678 (H)    IgA      87 - 352 mg/dL 159  181  197    IgM      26 - 217 mg/dL 73  82  82    Total Protein      6.0 - 8.5 g/dL 7.5  7.6  7.7    Albumin      2.9 - 4.4 g/dL 4.2  3.7  3.8    Alpha-1-Globulin      0.0 - 0.4 g/dL 0.2  0.3  0.2    Alpha-2-Globulin      0.4 - 1.0 g/dL 0.9  0.9  0.9    Beta Globulin      0.7 - 1.3 g/dL 1.0  1.2  1.1    Gamma Globulin      0.4 - 1.8 g/dL 1.3  1.5  1.7    M-Alvarez      Not Observed g/dL 0.3 (H)  0.5 (H)  0.6 (H)    Globulin      2.2 -  3.9 g/dL 3.3  3.9  3.9    A/G Ratio      0.7 - 1.7  1.3  1.0  1.0    Immunofixation Reflex, Serum Comment !  Comment !  Comment !    Please note Comment  Comment  Comment    Kappa FLC      3.3 - 19.4 mg/L 22.6 (H)  22.8 (H)  27.4 (H)    Free Lambda Light Chains      5.7 - 26.3 mg/L 11.8  14.7  15.2    Kappa/Lambda Ratio      0.26 - 1.65  1.92 (H)  1.55  1.80 (H)       Lab Results   Component Value Date    IRON 63 08/12/2024    LABIRON 17 (L) 08/12/2024    TRANSFERRIN 256 08/12/2024    TIBC 381 08/12/2024    FERRITIN 35.20 08/12/2024     Lab study indeed issue with a mild iron deficiency.  Since patient is symptomatic with significant teeth, will start the patient on oral iron ferrous sulfate 325 mg up to twice a day.    Lab study showed a slightly worsening monoclonal IgG kappa spike 0.6 g/dL.  This is not anywhere close to the level when she was diagnosed.  Continue to observe for now.    I spoke with the patient by telephone.    ADEBAYO SOTO M.D., Ph.D.        CC:  Beau Frye MD [place], Mor Hilliard MD

## 2024-08-13 LAB
ALBUMIN SERPL ELPH-MCNC: 3.8 G/DL (ref 2.9–4.4)
ALBUMIN/GLOB SERPL: 1 {RATIO} (ref 0.7–1.7)
ALPHA1 GLOB SERPL ELPH-MCNC: 0.2 G/DL (ref 0–0.4)
ALPHA2 GLOB SERPL ELPH-MCNC: 0.9 G/DL (ref 0.4–1)
B-GLOBULIN SERPL ELPH-MCNC: 1.1 G/DL (ref 0.7–1.3)
GAMMA GLOB SERPL ELPH-MCNC: 1.7 G/DL (ref 0.4–1.8)
GLOBULIN SER-MCNC: 3.9 G/DL (ref 2.2–3.9)
IGA SERPL-MCNC: 197 MG/DL (ref 87–352)
IGG SERPL-MCNC: 1678 MG/DL (ref 586–1602)
IGM SERPL-MCNC: 82 MG/DL (ref 26–217)
INTERPRETATION SERPL IEP-IMP: ABNORMAL
KAPPA LC FREE SER-MCNC: 27.4 MG/L (ref 3.3–19.4)
KAPPA LC FREE/LAMBDA FREE SER: 1.8 {RATIO} (ref 0.26–1.65)
LABORATORY COMMENT REPORT: ABNORMAL
LAMBDA LC FREE SERPL-MCNC: 15.2 MG/L (ref 5.7–26.3)
M PROTEIN SERPL ELPH-MCNC: 0.6 G/DL
PROT SERPL-MCNC: 7.7 G/DL (ref 6–8.5)

## 2024-09-10 RX ORDER — FERROUS SULFATE 325(65) MG
325 TABLET ORAL 2 TIMES DAILY WITH MEALS
Qty: 60 TABLET | Refills: 3 | Status: SHIPPED | OUTPATIENT
Start: 2024-09-10

## 2024-12-03 ENCOUNTER — LAB (OUTPATIENT)
Dept: LAB | Facility: HOSPITAL | Age: 54
End: 2024-12-03
Payer: COMMERCIAL

## 2024-12-03 ENCOUNTER — OFFICE VISIT (OUTPATIENT)
Dept: ONCOLOGY | Facility: CLINIC | Age: 54
End: 2024-12-03
Payer: COMMERCIAL

## 2024-12-03 VITALS
BODY MASS INDEX: 33.07 KG/M2 | TEMPERATURE: 98.4 F | HEIGHT: 65 IN | SYSTOLIC BLOOD PRESSURE: 162 MMHG | DIASTOLIC BLOOD PRESSURE: 95 MMHG | HEART RATE: 67 BPM | OXYGEN SATURATION: 99 % | WEIGHT: 198.5 LBS

## 2024-12-03 DIAGNOSIS — D50.9 IRON DEFICIENCY ANEMIA, UNSPECIFIED IRON DEFICIENCY ANEMIA TYPE: ICD-10-CM

## 2024-12-03 DIAGNOSIS — D47.2 SMOLDERING MULTIPLE MYELOMA: ICD-10-CM

## 2024-12-03 DIAGNOSIS — T45.4X5A ADVERSE REACTION TO COMPOUND IRON PREPARATION, INITIAL ENCOUNTER: ICD-10-CM

## 2024-12-03 DIAGNOSIS — E61.1 IRON DEFICIENCY: Primary | ICD-10-CM

## 2024-12-03 DIAGNOSIS — D75.839 THROMBOCYTOSIS: ICD-10-CM

## 2024-12-03 LAB
ALBUMIN SERPL-MCNC: 4.2 G/DL (ref 3.5–5.2)
ALBUMIN/GLOB SERPL: 1 G/DL
ALP SERPL-CCNC: 79 U/L (ref 39–117)
ALT SERPL W P-5'-P-CCNC: 14 U/L (ref 1–33)
ANION GAP SERPL CALCULATED.3IONS-SCNC: 12.1 MMOL/L (ref 5–15)
AST SERPL-CCNC: 19 U/L (ref 1–32)
B2 MICROGLOB SERPL-MCNC: 1.7 MG/L (ref 0.8–2.2)
BASOPHILS # BLD AUTO: 0.07 10*3/MM3 (ref 0–0.2)
BASOPHILS NFR BLD AUTO: 0.5 % (ref 0–1.5)
BILIRUB SERPL-MCNC: 0.2 MG/DL (ref 0–1.2)
BUN SERPL-MCNC: 13 MG/DL (ref 6–20)
BUN/CREAT SERPL: 17.6 (ref 7–25)
CALCIUM SPEC-SCNC: 9.2 MG/DL (ref 8.6–10.5)
CHLORIDE SERPL-SCNC: 99 MMOL/L (ref 98–107)
CO2 SERPL-SCNC: 24.9 MMOL/L (ref 22–29)
CREAT SERPL-MCNC: 0.74 MG/DL (ref 0.57–1)
DEPRECATED RDW RBC AUTO: 45.3 FL (ref 37–54)
EGFRCR SERPLBLD CKD-EPI 2021: 97.5 ML/MIN/1.73
EOSINOPHIL # BLD AUTO: 0.11 10*3/MM3 (ref 0–0.4)
EOSINOPHIL NFR BLD AUTO: 0.9 % (ref 0.3–6.2)
ERYTHROCYTE [DISTWIDTH] IN BLOOD BY AUTOMATED COUNT: 14.1 % (ref 12.3–15.4)
FERRITIN SERPL-MCNC: 62.8 NG/ML (ref 13–150)
FOLATE SERPL-MCNC: 9.12 NG/ML (ref 4.78–24.2)
GLOBULIN UR ELPH-MCNC: 4.1 GM/DL
GLUCOSE SERPL-MCNC: 89 MG/DL (ref 65–99)
HCT VFR BLD AUTO: 40.1 % (ref 34–46.6)
HGB BLD-MCNC: 12.8 G/DL (ref 12–15.9)
IMM GRANULOCYTES # BLD AUTO: 0.13 10*3/MM3 (ref 0–0.05)
IMM GRANULOCYTES NFR BLD AUTO: 1 % (ref 0–0.5)
IRON 24H UR-MRATE: 59 MCG/DL (ref 37–145)
IRON SATN MFR SERPL: 17 % (ref 20–50)
LDH SERPL-CCNC: 242 U/L (ref 135–214)
LYMPHOCYTES # BLD AUTO: 3.27 10*3/MM3 (ref 0.7–3.1)
LYMPHOCYTES NFR BLD AUTO: 25.5 % (ref 19.6–45.3)
MCH RBC QN AUTO: 28.1 PG (ref 26.6–33)
MCHC RBC AUTO-ENTMCNC: 31.9 G/DL (ref 31.5–35.7)
MCV RBC AUTO: 87.9 FL (ref 79–97)
MONOCYTES # BLD AUTO: 0.91 10*3/MM3 (ref 0.1–0.9)
MONOCYTES NFR BLD AUTO: 7.1 % (ref 5–12)
NEUTROPHILS NFR BLD AUTO: 65 % (ref 42.7–76)
NEUTROPHILS NFR BLD AUTO: 8.35 10*3/MM3 (ref 1.7–7)
NRBC BLD AUTO-RTO: 0 /100 WBC (ref 0–0.2)
PLATELET # BLD AUTO: 503 10*3/MM3 (ref 140–450)
PMV BLD AUTO: 9.5 FL (ref 6–12)
POTASSIUM SERPL-SCNC: 3.5 MMOL/L (ref 3.5–5.2)
PROT SERPL-MCNC: 8.3 G/DL (ref 6–8.5)
RBC # BLD AUTO: 4.56 10*6/MM3 (ref 3.77–5.28)
SODIUM SERPL-SCNC: 136 MMOL/L (ref 136–145)
TIBC SERPL-MCNC: 349 MCG/DL (ref 298–536)
TRANSFERRIN SERPL-MCNC: 234 MG/DL (ref 200–360)
VIT B12 BLD-MCNC: 1250 PG/ML (ref 211–946)
WBC NRBC COR # BLD AUTO: 12.84 10*3/MM3 (ref 3.4–10.8)

## 2024-12-03 PROCEDURE — 36415 COLL VENOUS BLD VENIPUNCTURE: CPT

## 2024-12-03 PROCEDURE — 83615 LACTATE (LD) (LDH) ENZYME: CPT

## 2024-12-03 PROCEDURE — 80053 COMPREHEN METABOLIC PANEL: CPT

## 2024-12-03 PROCEDURE — 84466 ASSAY OF TRANSFERRIN: CPT | Performed by: INTERNAL MEDICINE

## 2024-12-03 PROCEDURE — 82728 ASSAY OF FERRITIN: CPT | Performed by: INTERNAL MEDICINE

## 2024-12-03 PROCEDURE — 83540 ASSAY OF IRON: CPT | Performed by: INTERNAL MEDICINE

## 2024-12-03 PROCEDURE — 82232 ASSAY OF BETA-2 PROTEIN: CPT | Performed by: INTERNAL MEDICINE

## 2024-12-03 PROCEDURE — 82607 VITAMIN B-12: CPT | Performed by: INTERNAL MEDICINE

## 2024-12-03 PROCEDURE — 82746 ASSAY OF FOLIC ACID SERUM: CPT | Performed by: INTERNAL MEDICINE

## 2024-12-03 PROCEDURE — 85025 COMPLETE CBC W/AUTO DIFF WBC: CPT

## 2024-12-03 NOTE — PROGRESS NOTES
Subjective      REASONS FOR FOLLOWUP:    1.  MGUS, IgG kappa.   2. LEUKOCYTOSIS AND THROMBOCYTOSIS.  3.  Iron deficiency anemia.    HISTORY OF PRESENT ILLNESS:The patient is a 54 y.o. female who presents for follow-up evaluation.     History of Present Illness  The patient presented on 12/03/2023 for a 3-month follow-up evaluation.    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    Patient has been on oral iron twice a day for 3 months now.  She reports no side effects such as constipation, nausea, or abdominal cramping from oral iron treatment. Despite taking iron supplements, she has not noticed any significant changes. Previously, when she took 1 iron tablet, she experienced constipation and sickness. However, when she took 2 iron tablets, she found no relief. She stopped taking vitamin B12 at night a week ago and has not taken multivitamins for about 6 months.    She reports no fevers, sweating, urinary tract infections, flushing, or sinus infection.      Results  Laboratory Studies on 12/3/2024  Hemoglobin 12.8, MCV 87.9, mild thrombocytosis, platelets 503,000, WBC 12,840, neutrophils 8350, lymphocytes 3,270, monocytes 910.  Unremarkable CMP including creatinine 0.74, calcium 9.2, mildly elevated .  Ferritin 62.6 ng/mL, free iron 59 and iron saturation 17%, B12 at 1250 and the folate 9.1 ng/mL.    Lab study on 8/12/2024 ferritin 35.2, free iron 63, iron saturation 17%, and hemoglobin 13.1. Serum M spike 0.6 g/dL with the monoclonal IgG kappa. Serum IgG 1678, marginally elevated free kappa chain 27.4 mg/L with a kappa/lambda ratio 1.80.          Past Medical History:   Diagnosis Date    Achilles tendonitis     Anemia     Anxiety     Arthritis     ankles, shoulders    Asthma     childhood    Benign essential hypertension     Cerebral aneurysm     GERD (gastroesophageal reflux disease)     Headache     History of anemia     History of E. coli  septicemia     Multiple myeloma 2023    Rotator cuff syndrome     Seasonal allergies    GOITER VERY LIKELY HASHIMOTO'S THYROIDITIS.  SJOGREN'S SYNDROME.    Past Surgical History:   Procedure Laterality Date    ABSCESS DRAINAGE  2000    Renal abscess    CEREBRAL ANGIOGRAM  2020    CRANIOTOMY  2020    with angiogram for aneurysm    HYSTERECTOMY  2017    partial, both ovaries remain    ROTATOR CUFF REPAIR Right 2012    SHOULDER SURGERY          Current Outpatient Medications on File Prior to Visit   Medication Sig Dispense Refill    albuterol sulfate  (90 Base) MCG/ACT inhaler Ventolin HFA 90 mcg/actuation aerosol inhaler      amLODIPine (NORVASC) 5 MG tablet Take 1 tablet by mouth Daily. 30 tablet 3    ferrous sulfate 325 (65 FE) MG tablet Take 1 tablet by mouth 2 (Two) Times a Day With Meals. 60 tablet 3    levothyroxine (SYNTHROID, LEVOTHROID) 25 MCG tablet Take 1 tablet by mouth Daily.      losartan (COZAAR) 100 MG tablet TAKE 1 TABLET BY MOUTH ONCE DAILY WITH MEALS FOR 30 DAYS      metoprolol tartrate (LOPRESSOR) 50 MG tablet Take 1 tablet by mouth Every Morning.      ondansetron (ZOFRAN) 4 MG tablet Take 1 tablet by mouth Every 8 (Eight) Hours As Needed.      Xiidra 5 % ophthalmic solution 1 drop.       No current facility-administered medications on file prior to visit.        ALLERGIES:    Allergies   Allergen Reactions    Coconut Shortness Of Breath    Egg-Derived Products Diarrhea        Social History     Socioeconomic History    Marital status:     Number of children: 3   Tobacco Use    Smoking status: Never    Smokeless tobacco: Never   Vaping Use    Vaping status: Never Used   Substance and Sexual Activity    Alcohol use: Yes    Drug use: Never        Family History   Problem Relation Age of Onset    Hypertension Father     Multiple sclerosis Brother     Diabetes Brother     Colon cancer Paternal Aunt     Heart attack Maternal Grandmother     Heart disease Maternal Grandmother      "Pancreatic cancer Maternal Grandfather     Colon cancer Paternal Grandmother     Heart attack Paternal Grandfather     Cancer Paternal Grandfather       Objective     Vitals:    12/03/24 1545   BP: 162/95   Pulse: 67   Temp: 98.4 °F (36.9 °C)   TempSrc: Oral   SpO2: 99%   Weight: 90 kg (198 lb 8 oz)   Height: 165.1 cm (65\")   PainSc: 0-No pain           12/3/2024     3:44 PM   Current Status   ECOG score 0     Physical Exam  Vital Signs  Vitals show blood pressure at 162/95, temperature at 98.4 degrees Fahrenheit, pulse at 67, and oxygen saturation at 99 percent on room air.    Physical Exam  Vitals reviewed.   Constitutional:       Appearance: Normal appearance. She is well-developed.   HENT:      Head: Normocephalic and atraumatic.      Nose: Nose normal.   Eyes:      Conjunctiva/sclera: Conjunctivae normal.   Cardiovascular:      Rate and Rhythm: Normal rate and regular rhythm.      Heart sounds: Normal heart sounds.   Pulmonary:      Effort: Pulmonary effort is normal. No respiratory distress.      Breath sounds: Normal breath sounds.   Abdominal:      General: Bowel sounds are normal. There is no distension.      Palpations: Abdomen is soft.   Musculoskeletal:         General: Tenderness present. No swelling.   Lymphadenopathy:      Cervical: No cervical adenopathy.   Skin:     General: Skin is warm.   Neurological:      Mental Status: She is alert. Mental status is at baseline.   Psychiatric:         Thought Content: Thought content normal.     RECENT LABS:  Lab Results   Component Value Date    WBC 12.84 (H) 12/03/2024    HGB 12.8 12/03/2024    HCT 40.1 12/03/2024    MCV 87.9 12/03/2024     (H) 12/03/2024      Lab Results   Component Value Date    GLUCOSE 89 12/03/2024    BUN 13 12/03/2024    CREATININE 0.74 12/03/2024     12/03/2024    K 3.5 12/03/2024    CL 99 12/03/2024    CALCIUM 9.2 12/03/2024    PROTEINTOT 8.3 12/03/2024    ALBUMIN 4.2 12/03/2024    ALT 14 12/03/2024    AST 19 12/03/2024    " ALKPHOS 79 12/03/2024    BILITOT 0.2 12/03/2024    GLOB 4.1 12/03/2024    AGRATIO 1.0 12/03/2024    BCR 17.6 12/03/2024    ANIONGAP 12.1 12/03/2024    EGFR 97.5 12/03/2024             Lab Results   Component Value Date    IRON 63 08/12/2024    LABIRON 17 (L) 08/12/2024    TRANSFERRIN 256 08/12/2024    TIBC 381 08/12/2024    FERRITIN 35.20 08/12/2024       Assessment & Plan     Assessment & Plan       1.  Multiple Myeloma:    Referred 11/14/2022 for leukocytosis and thrombocytosis by rheumatology, Dr. Ayala.  Diagnosed with Sjogren's by Dr. Ayala.  History of anemia when she had a cerebral aneurysm clip done in 2019.  Patient found to have serum protein electrophoresis that disclosed a monoclonal protein   IgG monoclonal protein in the 2000 category, Bence-Moon protein in the urine, very small amount that is not quantifiable.  Recommend patient proceed with bone marrow testing.  1/24/2023 bone marrow biopsy  Bone marrow biopsy showing 18% plasma cells infiltrating in the bone marrow. Also absence of iron stores. The patient had no evidence of myelofibrosis, lymphoma, leukemia, or myelodysplasia.   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 [place] red stain was negative for amyloid deposition  Recommended treatment with combination of Darzalex once a week, Velcade subcutaneously once a week, 3 weeks out of 4, Revlimid 15 mg a day for 3 weeks out of a month and dexamethasone 20 mg once a week.  Plan to treat the patient for the next 4 months following her monoclonal protein and eventually referred to [facility] for consideration of high-dose chemotherapy and stem cell transplant.  No evidence of lytic lesions, therefore will not use Zometa or Xgeva at this time.  2/3/2023 cycle 1 Darzalex Faspro and Velcade.  02/10/2023 the patient was further reviewed the day that she comes for her 2nd Darzalex infusion and 2nd Velcade injection. So far the patient has not  encountered any side effects of the medicines.  Seen 2/24/2023 due for cycle 1 day 22.  Patient started her Revlimid 15 mg daily for 3 out of 4 weeks last Wednesday.  So far she is tolerating all of her treatment fairly well without any significant side effects other than ongoing fatigue.  3/10/2023 here for cycle 2, day 8 Velcade, Darzalex fast pro, continuing on Revlimid 15 mg daily for 3 weeks on, 1 week off as well as dexamethasone 20 mg weekly.  We will recheck paraprotein studies today.  On 03/17/2023,  She has had resolution of pain in her lower extremities.  Her monoclonal protein has dropped by half in only 1 month of therapy.  She questions referral to BMT, which will be made to Dr. Plasencia in May or June.  3/31/2023: C3D1 Darzalex and Velcade.  Continues Revlimid at home, currently on her second week on Revlimid.  Continues dexamethasone weekly.  Tolerating well.  4/7/2023 seen today for cycle 3, day 8.  She will receive Velcade only today (Darzalex which is every other week).  She will continue her Revlimid 15 mg daily for 21 out of 28 days, as well as dexamethasone 20 mg weekly.  Repeat paraprotein studies pending for today.  4/14/2023 due for cycle 3, day 15 Velcade continuing to complain of leg pain.  We reviewed M spike improved to 0.5,Free light chain kappa 22.2, kappa/lambda ratio 1.8.   4/21/2023 patient returns for cycle 3-day 22 for follow-up of her leg pain.  X-rays performed 4/19/2023 show bilateral knee degeneration however otherwise unremarkable.  Patient did see rheumatology this week and they made multiple medication changes suggestions for her primary care and have asked the patient to make an appointment with them.  We will go ahead and place a referral for cardiology evaluation as recommended by rheumatology.  Case discussed with Dr. Freedman today and we will proceed with bilateral lower extremity venous Doppler prior to her next visit.   Additional labs obtained including B12, folate, CK  all unremarkable.  4/28/2023: Proceed with cycle 4-day 1 Darzalex and Velcade.  Bilateral Doppler negative for DVT.  Consult with cardiology scheduled 5/12/2023.  Continues with swelling/tightness to the bilateral lower extremities.  Saw her PCP who discontinued amlodipine and started losartan.  Scheduled to see cardiology 5/12/2023.  She does feel the swelling/tightness is stable.  5/12/2023, evaluation by Dr. Freedman with recommendations to discontinue Velcade.  Continue Darzalex, weekly dexamethasone 20 mg and Revlimid 15 mg 21 out of 28 days.  She was referred to [facility] bone marrow transplant team  Evaluation by [facility] bone marrow transplant team 5/16/2023.  However, the patient reports they recommended stopping chemotherapy.    Last chemotherapy was on 5/26/2023 for cycle 5-day 1 and chemotherapy was stopped.   09/26/2023 the patient clinically looks stable. She has no peripheral adenopathy, hepatosplenomegaly, bone pain. Obviously, neuropathy. Her white count, hemoglobin and platelets are normal. White count differential is normal. Chemistry profile is normal. Monoclonal protein studies are pending. I advised the patient to remain in observation from this point of view and advised her to return to see us back in 4 months with repeat CBC, CMP, and serum protein immunoelectrophoresis. Find no need for radiological assessment at this time.  Returns 1/16/2024 Continuing in observation in regards to her myeloma.  Continues to report intermittent issues with pain although it is unchanged from her previous visit.  Repeat paraprotein studies from today are pending.  04/16/2024, the patient was further reviewed. She has not had any bone pain. All her pains are muscle related and is variable from time to time. She has no fever or infections. No abnormal bleeding. Her clinical exam is otherwise unremarkable. Her white count, hemoglobin and platelets are normal. White count differential  is normal. Chemistry profile and monoclonal protein studies are pending.   Patient seen back in the office on 6/28/2024 with complaints of worsening pain especially in her lower extremities, bilaterally.  This has been worsening over the past few months.  The pain is excruciating at times.  She is complaining of worsening fatigue and weakness.  She also has pain in her upper extremities and back, but the lower extremity pain is the most bothersome.  Repeat paraprotein studies for today are pending.  I have discussed with Dr. Freedman.  We will also check a CPK, and sed rate.  Discussed patient will likely need to follow-up with her rheumatologist.  On 6/28/2024 Monoclonal serum IgG kappa M spike is 0.5 g/dL. Normal serum IgG 1519, free IgA 181, IgM 82, free kappa chain 22.8, free lambda chain 14.7, and kappa/lambda ratio 1.55.   Evaluated patient on 8/12/2024.  She has normal renal function, and calcium 9.6, hemoglobin 13.1.  Lab study on 8/12/2024 reported serum M spike was 0.6 g/dL with monoclonal IgG kappa. Elevated serum IgG was 1678, and marginally elevated free kappa chain was 27.4 mg/L with a kappa/lambda ratio of 1.80.    Recently on 11/21/2024 kappa level was slightly elevated at 37.9. Serum paraprotein immunofixation and M spike results not available for review.       2.  Nodular goiter: she has a normal TSH and a normal T4. I will let Beau Frye MD, the patient's Primary Physician address this issue eventually with an ultrasound and clinical examination. The patient has antithyroid antibody positivity and very likely in my opinion she probably has a component of Hashimoto's thyroiditis.   On 03/17/2023 her goiter is no different today than what it was during the original consultation. This will be watched in absence of any other intervention.  On 05/12/2023 we know that she has Hashimoto thyroiditis and her thyroid function has remained stable at this time.  09/26/2023 her goiter is no different today than  what it has been before. This will be watched in absence of any other intervention.  On 04/16/2024, her multinodular goiter remains unchanged.  8/12/2024 stable condition.  12/3/2024 no specific complaints.    3.  Sjogren's syndrome with myopathy:  Followed by rheumatology  On 03/17/2023 the patient has minimal symptomatology pertinent to this at this time besides minimal dryness in her eyes and oral mucosas.  On 05/12/2023 continues having pain in the lower extremities. Sounds more neuropathic than anything else. Velcade discontinued at this time.   Velcade discontinued 2 weeks ago with ongoing pain.  She was encouraged to follow-up with rheumatology or neurosurgery to review previous MRIs of the spine which do indicate degenerative disc disease  Continues to follow with rheumatology.  On 04/16/2024, her Sjogren syndrome remains ongoing. Minimal dryness in her eyes. Mostly proper amount of saliva. No other GI manifestations.  12/03/2024 patient will continue follow-up by rheumatology service.      4.  Skin rash-called 3/23/2023 to report skin rash to the bilateral cheeks, neck, spreading towards her shoulders.  She was advised to hold Bactrim.  She was also sent clindamycin gel which she started on Tuesday.  Skin rash is now only present to the bilateral cheeks and much improved.  She will continue clindamycin gel until resolution.  She will continue to hold Bactrim.  Skin rash improved continuing on clindamycin gel.  Resolved.  On 04/16/2024, no rash of any nature.  12/3/2024 no skin rashes.      5. Hypertension.  8/12/2024 her blood pressure is elevated today 176/105.  Patient already has been taking Lopressor 50 mg daily and losartan 100 mg daily.    I will start amlodipine 5 mg daily.  She was advised to monitor her blood pressure at home.  12/3/2024 /95.  Continue current medications.    6.  Iron deficiency.  History of iron deficiency.  Patient continues having severe fatigue.  On 8/12/2024, she is not  anemic Hb 13.1 MCV 88.1, but has a history of iron deficiency. Her iron levels will be checked today.  8/12/2024 lab study confirmed iron deficiency with ferritin 35.2 ng/mL, free iron 63 and iron saturation 17%.  Increase oral iron to twice a day.  Today 12/3/2024 patient presented for reevaluation.  Reports tolerating oral iron twice a day without any side effects. Her iron, B12, and folate levels will be rechecked today. If his iron levels are low, he will be compliant with his twice-daily iron regimen. However, if his iron levels are insufficient, he may be experiencing insufficient absorption and symptoms, necessitating IV iron therapy. (      7.  Mild thrombocytosis.  Lab study today on 8/12/2024 reported platelets 502,000.  No signs of infection.  Suspected this is reactive.  May be related to iron deficiency, and could be also related to her rheumatoid disease.  Continue to monitor.    5/3/2024 platelets are mildly elevated at 503,000 today, slightly higher than the previous counts of 502,000 in June and 467,000 in the past. This will be monitored.      PLAN:   Repeat iron study today with a ferritin iron profile, also check B12 and folate for reassessment of anemia.  If persistent iron deficiency no improvement, we will consider intravenous iron therapy.  Continue oral iron twice a day.    She will continue management of diabetes with Norvasc, losartan and Lopressor, followed by PCP for management of hypertension.  I will see patient in 6 months for reevaluation we will check CBC CMP ferritin and iron profile.  Call/ return sooner should the patient develop any new concerns or problems.        Aung Riggins MD PhD  12/03/2024      Addendum:  Lab Results   Component Value Date    IRON 59 12/03/2024    LABIRON 17 (L) 12/03/2024    TRANSFERRIN 234 12/03/2024    TIBC 349 12/03/2024    FERRITIN 62.80 12/03/2024     Lab Results   Component Value Date    FOLATE 9.12 12/03/2024     Lab Results   Component Value  Date    [id number] 1,250 (H) 12/03/2024     Lab study reportedly improved ferritin level and maintains iron saturation.  She will continue taking oral iron twice a day.    The patient has supratherapeutic B12 level.  Folate level is mediocre.      ADEBAYO SOTO M.D., Ph.D.        CC:  Beau Frye MD [place], Mor Hilliard MD

## 2024-12-04 ENCOUNTER — TELEPHONE (OUTPATIENT)
Dept: ONCOLOGY | Facility: CLINIC | Age: 54
End: 2024-12-04
Payer: COMMERCIAL

## 2024-12-04 PROBLEM — D50.9 IRON DEFICIENCY ANEMIA, UNSPECIFIED: Status: ACTIVE | Noted: 2024-12-04

## 2024-12-04 PROBLEM — T45.4X5A ADVERSE REACTION TO COMPOUND IRON PREPARATION, INITIAL ENCOUNTER: Status: ACTIVE | Noted: 2024-12-04

## 2024-12-04 RX ORDER — SODIUM CHLORIDE 9 MG/ML
20 INJECTION, SOLUTION INTRAVENOUS ONCE
Status: CANCELLED | OUTPATIENT
Start: 2024-12-13

## 2024-12-04 RX ORDER — DIPHENHYDRAMINE HYDROCHLORIDE 50 MG/ML
50 INJECTION INTRAMUSCULAR; INTRAVENOUS AS NEEDED
OUTPATIENT
Start: 2024-12-20

## 2024-12-04 RX ORDER — FAMOTIDINE 10 MG/ML
20 INJECTION, SOLUTION INTRAVENOUS AS NEEDED
Status: CANCELLED | OUTPATIENT
Start: 2024-12-13

## 2024-12-04 RX ORDER — CETIRIZINE HYDROCHLORIDE 10 MG/1
10 TABLET ORAL ONCE
OUTPATIENT
Start: 2024-12-20

## 2024-12-04 RX ORDER — DIPHENHYDRAMINE HYDROCHLORIDE 50 MG/ML
50 INJECTION INTRAMUSCULAR; INTRAVENOUS AS NEEDED
Status: CANCELLED | OUTPATIENT
Start: 2024-12-13

## 2024-12-04 RX ORDER — SODIUM CHLORIDE 9 MG/ML
20 INJECTION, SOLUTION INTRAVENOUS ONCE
OUTPATIENT
Start: 2024-12-20

## 2024-12-04 RX ORDER — FAMOTIDINE 10 MG/ML
20 INJECTION, SOLUTION INTRAVENOUS ONCE
OUTPATIENT
Start: 2024-12-20

## 2024-12-04 RX ORDER — HYDROCORTISONE SODIUM SUCCINATE 100 MG/2ML
100 INJECTION INTRAMUSCULAR; INTRAVENOUS AS NEEDED
Status: CANCELLED | OUTPATIENT
Start: 2024-12-13

## 2024-12-04 RX ORDER — FAMOTIDINE 10 MG/ML
20 INJECTION, SOLUTION INTRAVENOUS AS NEEDED
OUTPATIENT
Start: 2024-12-20

## 2024-12-04 RX ORDER — HYDROCORTISONE SODIUM SUCCINATE 100 MG/2ML
100 INJECTION INTRAMUSCULAR; INTRAVENOUS AS NEEDED
OUTPATIENT
Start: 2024-12-20

## 2024-12-04 RX ORDER — CETIRIZINE HYDROCHLORIDE 10 MG/1
10 TABLET ORAL ONCE
Status: CANCELLED | OUTPATIENT
Start: 2024-12-13

## 2024-12-04 RX ORDER — FAMOTIDINE 10 MG/ML
20 INJECTION, SOLUTION INTRAVENOUS ONCE
Status: CANCELLED | OUTPATIENT
Start: 2024-12-13

## 2024-12-04 NOTE — TELEPHONE ENCOUNTER
----- Message from Aung Riggins sent at 12/3/2024 10:00 PM EST -----  Halle and Adri    Would you please arrange patient to receive IV iron therapy with Venofer 300 mg x 3 or Feraheme 510 mg x 2.    Thank you very much!    [place]son  ----- Message -----  From: Lab, Background User  Sent: 12/3/2024   4:29 PM EST  To: Aung Riggins MD PhD

## 2024-12-13 ENCOUNTER — INFUSION (OUTPATIENT)
Dept: ONCOLOGY | Facility: HOSPITAL | Age: 54
End: 2024-12-13
Payer: COMMERCIAL

## 2024-12-13 VITALS
HEART RATE: 85 BPM | OXYGEN SATURATION: 95 % | TEMPERATURE: 98.4 F | BODY MASS INDEX: 33.58 KG/M2 | DIASTOLIC BLOOD PRESSURE: 86 MMHG | RESPIRATION RATE: 16 BRPM | WEIGHT: 201.8 LBS | SYSTOLIC BLOOD PRESSURE: 142 MMHG

## 2024-12-13 DIAGNOSIS — D50.9 IRON DEFICIENCY ANEMIA, UNSPECIFIED IRON DEFICIENCY ANEMIA TYPE: ICD-10-CM

## 2024-12-13 DIAGNOSIS — T45.4X5A ADVERSE REACTION TO COMPOUND IRON PREPARATION, INITIAL ENCOUNTER: Primary | ICD-10-CM

## 2024-12-13 PROCEDURE — 25010000002 FERUMOXYTOL 510 MG/17ML SOLUTION 17 ML VIAL: Performed by: INTERNAL MEDICINE

## 2024-12-13 PROCEDURE — 96375 TX/PRO/DX INJ NEW DRUG ADDON: CPT

## 2024-12-13 PROCEDURE — 96374 THER/PROPH/DIAG INJ IV PUSH: CPT

## 2024-12-13 RX ORDER — CETIRIZINE HYDROCHLORIDE 10 MG/1
10 TABLET ORAL ONCE
Status: COMPLETED | OUTPATIENT
Start: 2024-12-13 | End: 2024-12-13

## 2024-12-13 RX ORDER — FAMOTIDINE 10 MG/ML
20 INJECTION, SOLUTION INTRAVENOUS ONCE
Status: COMPLETED | OUTPATIENT
Start: 2024-12-13 | End: 2024-12-13

## 2024-12-13 RX ADMIN — FAMOTIDINE 20 MG: 10 INJECTION INTRAVENOUS at 13:01

## 2024-12-13 RX ADMIN — FERUMOXYTOL 510 MG: 510 INJECTION INTRAVENOUS at 13:29

## 2024-12-13 RX ADMIN — CETIRIZINE HYDROCHLORIDE 10 MG: 10 TABLET, FILM COATED ORAL at 13:01

## 2024-12-16 RX ORDER — AMLODIPINE BESYLATE 5 MG/1
5 TABLET ORAL DAILY
Qty: 30 TABLET | Refills: 0 | OUTPATIENT
Start: 2024-12-16

## 2024-12-20 ENCOUNTER — INFUSION (OUTPATIENT)
Dept: ONCOLOGY | Facility: HOSPITAL | Age: 54
End: 2024-12-20
Payer: COMMERCIAL

## 2024-12-20 VITALS
TEMPERATURE: 98.4 F | HEART RATE: 71 BPM | WEIGHT: 200.4 LBS | SYSTOLIC BLOOD PRESSURE: 156 MMHG | OXYGEN SATURATION: 97 % | DIASTOLIC BLOOD PRESSURE: 88 MMHG | RESPIRATION RATE: 16 BRPM | BODY MASS INDEX: 33.35 KG/M2

## 2024-12-20 DIAGNOSIS — D50.9 IRON DEFICIENCY ANEMIA, UNSPECIFIED IRON DEFICIENCY ANEMIA TYPE: ICD-10-CM

## 2024-12-20 DIAGNOSIS — T45.4X5A ADVERSE REACTION TO COMPOUND IRON PREPARATION, INITIAL ENCOUNTER: Primary | ICD-10-CM

## 2024-12-20 PROCEDURE — 25010000002 FERUMOXYTOL 510 MG/17ML SOLUTION 17 ML VIAL: Performed by: INTERNAL MEDICINE

## 2024-12-20 PROCEDURE — 96374 THER/PROPH/DIAG INJ IV PUSH: CPT

## 2024-12-20 PROCEDURE — 96375 TX/PRO/DX INJ NEW DRUG ADDON: CPT

## 2024-12-20 RX ORDER — FAMOTIDINE 10 MG/ML
20 INJECTION, SOLUTION INTRAVENOUS ONCE
Status: COMPLETED | OUTPATIENT
Start: 2024-12-20 | End: 2024-12-20

## 2024-12-20 RX ORDER — CETIRIZINE HYDROCHLORIDE 10 MG/1
10 TABLET ORAL ONCE
Status: COMPLETED | OUTPATIENT
Start: 2024-12-20 | End: 2024-12-20

## 2024-12-20 RX ADMIN — FERUMOXYTOL 510 MG: 510 INJECTION INTRAVENOUS at 15:27

## 2024-12-20 RX ADMIN — FAMOTIDINE 20 MG: 10 INJECTION INTRAVENOUS at 15:15

## 2024-12-20 RX ADMIN — CETIRIZINE HYDROCHLORIDE 10 MG: 10 TABLET, FILM COATED ORAL at 15:14

## 2025-02-04 ENCOUNTER — TRANSCRIBE ORDERS (OUTPATIENT)
Dept: ADMINISTRATIVE | Facility: HOSPITAL | Age: 55
End: 2025-02-04
Payer: COMMERCIAL

## 2025-02-04 DIAGNOSIS — Z12.31 BREAST CANCER SCREENING BY MAMMOGRAM: Primary | ICD-10-CM

## 2025-02-19 ENCOUNTER — APPOINTMENT (OUTPATIENT)
Dept: WOMENS IMAGING | Facility: HOSPITAL | Age: 55
End: 2025-02-19
Payer: COMMERCIAL

## 2025-02-19 PROCEDURE — 77067 SCR MAMMO BI INCL CAD: CPT | Performed by: RADIOLOGY

## 2025-02-19 PROCEDURE — 77063 BREAST TOMOSYNTHESIS BI: CPT | Performed by: RADIOLOGY

## 2025-06-03 ENCOUNTER — PRIOR AUTHORIZATION (OUTPATIENT)
Dept: ONCOLOGY | Facility: CLINIC | Age: 55
End: 2025-06-03
Payer: COMMERCIAL

## 2025-06-03 ENCOUNTER — LAB (OUTPATIENT)
Dept: LAB | Facility: HOSPITAL | Age: 55
End: 2025-06-03
Payer: COMMERCIAL

## 2025-06-03 ENCOUNTER — OFFICE VISIT (OUTPATIENT)
Dept: ONCOLOGY | Facility: CLINIC | Age: 55
End: 2025-06-03
Payer: COMMERCIAL

## 2025-06-03 VITALS
WEIGHT: 201.7 LBS | DIASTOLIC BLOOD PRESSURE: 81 MMHG | HEART RATE: 63 BPM | HEIGHT: 65 IN | OXYGEN SATURATION: 100 % | RESPIRATION RATE: 16 BRPM | BODY MASS INDEX: 33.61 KG/M2 | TEMPERATURE: 98.1 F | SYSTOLIC BLOOD PRESSURE: 135 MMHG

## 2025-06-03 DIAGNOSIS — D47.2 SMOLDERING MULTIPLE MYELOMA: ICD-10-CM

## 2025-06-03 DIAGNOSIS — D75.839 THROMBOCYTOSIS: ICD-10-CM

## 2025-06-03 DIAGNOSIS — D72.829 LEUKOCYTOSIS, UNSPECIFIED TYPE: Primary | ICD-10-CM

## 2025-06-03 DIAGNOSIS — D50.9 IRON DEFICIENCY ANEMIA, UNSPECIFIED IRON DEFICIENCY ANEMIA TYPE: ICD-10-CM

## 2025-06-03 DIAGNOSIS — T45.4X5A ADVERSE REACTION TO COMPOUND IRON PREPARATION, INITIAL ENCOUNTER: ICD-10-CM

## 2025-06-03 LAB
ALBUMIN SERPL-MCNC: 4.5 G/DL (ref 3.5–5.2)
ALBUMIN/GLOB SERPL: 1.4 G/DL
ALP SERPL-CCNC: 82 U/L (ref 39–117)
ALT SERPL W P-5'-P-CCNC: 23 U/L (ref 1–33)
ANION GAP SERPL CALCULATED.3IONS-SCNC: 11.2 MMOL/L (ref 5–15)
AST SERPL-CCNC: 21 U/L (ref 1–32)
BASOPHILS # BLD AUTO: 0.07 10*3/MM3 (ref 0–0.2)
BASOPHILS NFR BLD AUTO: 0.5 % (ref 0–1.5)
BILIRUB SERPL-MCNC: 0.2 MG/DL (ref 0–1.2)
BUN SERPL-MCNC: 11.9 MG/DL (ref 6–20)
BUN/CREAT SERPL: 15.5 (ref 7–25)
CALCIUM SPEC-SCNC: 9.9 MG/DL (ref 8.6–10.5)
CHLORIDE SERPL-SCNC: 101 MMOL/L (ref 98–107)
CO2 SERPL-SCNC: 25.8 MMOL/L (ref 22–29)
CREAT SERPL-MCNC: 0.77 MG/DL (ref 0.57–1)
DEPRECATED RDW RBC AUTO: 46.6 FL (ref 37–54)
EGFRCR SERPLBLD CKD-EPI 2021: 92.9 ML/MIN/1.73
EOSINOPHIL # BLD AUTO: 0.19 10*3/MM3 (ref 0–0.4)
EOSINOPHIL NFR BLD AUTO: 1.4 % (ref 0.3–6.2)
ERYTHROCYTE [DISTWIDTH] IN BLOOD BY AUTOMATED COUNT: 14.1 % (ref 12.3–15.4)
FERRITIN SERPL-MCNC: 368 NG/ML (ref 13–150)
GLOBULIN UR ELPH-MCNC: 3.3 GM/DL
GLUCOSE SERPL-MCNC: 126 MG/DL (ref 65–99)
HCT VFR BLD AUTO: 41.2 % (ref 34–46.6)
HGB BLD-MCNC: 13.1 G/DL (ref 12–15.9)
IMM GRANULOCYTES # BLD AUTO: 0.13 10*3/MM3 (ref 0–0.05)
IMM GRANULOCYTES NFR BLD AUTO: 1 % (ref 0–0.5)
IRON 24H UR-MRATE: 89 MCG/DL (ref 37–145)
IRON SATN MFR SERPL: 30 % (ref 20–50)
LYMPHOCYTES # BLD AUTO: 2.75 10*3/MM3 (ref 0.7–3.1)
LYMPHOCYTES NFR BLD AUTO: 20.4 % (ref 19.6–45.3)
MCH RBC QN AUTO: 28.9 PG (ref 26.6–33)
MCHC RBC AUTO-ENTMCNC: 31.8 G/DL (ref 31.5–35.7)
MCV RBC AUTO: 90.7 FL (ref 79–97)
MONOCYTES # BLD AUTO: 0.87 10*3/MM3 (ref 0.1–0.9)
MONOCYTES NFR BLD AUTO: 6.5 % (ref 5–12)
NEUTROPHILS NFR BLD AUTO: 70.2 % (ref 42.7–76)
NEUTROPHILS NFR BLD AUTO: 9.47 10*3/MM3 (ref 1.7–7)
NRBC BLD AUTO-RTO: 0 /100 WBC (ref 0–0.2)
PLATELET # BLD AUTO: 524 10*3/MM3 (ref 140–450)
PMV BLD AUTO: 9.8 FL (ref 6–12)
POTASSIUM SERPL-SCNC: 4 MMOL/L (ref 3.5–5.2)
PROT SERPL-MCNC: 7.8 G/DL (ref 6–8.5)
RBC # BLD AUTO: 4.54 10*6/MM3 (ref 3.77–5.28)
SODIUM SERPL-SCNC: 138 MMOL/L (ref 136–145)
TIBC SERPL-MCNC: 298 MCG/DL (ref 298–536)
TRANSFERRIN SERPL-MCNC: 200 MG/DL (ref 200–360)
WBC NRBC COR # BLD AUTO: 13.48 10*3/MM3 (ref 3.4–10.8)

## 2025-06-03 PROCEDURE — 83540 ASSAY OF IRON: CPT

## 2025-06-03 PROCEDURE — 80053 COMPREHEN METABOLIC PANEL: CPT

## 2025-06-03 PROCEDURE — 84466 ASSAY OF TRANSFERRIN: CPT

## 2025-06-03 PROCEDURE — 36415 COLL VENOUS BLD VENIPUNCTURE: CPT

## 2025-06-03 PROCEDURE — 82728 ASSAY OF FERRITIN: CPT

## 2025-06-03 PROCEDURE — 85025 COMPLETE CBC W/AUTO DIFF WBC: CPT

## 2025-06-03 NOTE — PROGRESS NOTES
Subjective      REASONS FOR FOLLOWUP:    1. MGUS, IgG kappa.   2.  Leukocytosis and thrombocytosis.  3.  Iron deficiency anemia.    HISTORY OF PRESENT ILLNESS:The patient is a 54 y.o. female who presents for follow-up evaluation.     History of Present Illness  The patient presents today 6/3/2025 for a 6-month follow-up evaluation due to leukocytosis, thrombocytosis, and iron deficiency anemia.    Laboratory studies on 06/03/2025 showed slightly worsening leukocytosis with a WBC of 13,480, including neutrophils at 9470, and persistently elevated platelets at 524,000. Hemoglobin levels are normal at 13.1. Iron studies indicate improved ferritin at 368, free iron at 89, and iron saturation at 30%. She reports a slight improvement in energy levels following iron therapy but notes that her condition fluctuates.     Due to a lack of response to oral iron treatment, she received two doses of Feraheme in December 2024. She has been experiencing back pain and unusual blotches on her arms and legs upon sun exposure, a symptom first noticed in the summer of 2024.     She has been under the care of Dr. Plasencia at [facility] since her diagnosis of MGUS in September 2023, with biannual visits initially, which have now increased to every four months due to elevated platelet counts. Her most recent consultation with Dr. Plasencia was on 05/15/2025.    Results    Laboratory Studies  Laboratory studies from 06/03/2025 showed slightly worsening leukocytosis, WBC 13,480 including neutrophils 9470, and persistently elevated platelets 524,000. Normal hemoglobin 13.1. Iron studies showed improved ferritin 368, free iron 89, iron saturation 30%. Chemistry labs report glucose 126, otherwise unremarkable CMP.      Past Medical History:   Diagnosis Date    Achilles tendonitis     Anemia     Anxiety     Arthritis     ankles, shoulders    Asthma     childhood    Benign essential hypertension     Cerebral aneurysm     GERD  (gastroesophageal reflux disease)     Headache     History of anemia     History of E. coli septicemia     Multiple myeloma 2023    Rotator cuff syndrome     Seasonal allergies    GOITER VERY LIKELY HASHIMOTO'S THYROIDITIS.  SJOGREN'S SYNDROME.    Past Surgical History:   Procedure Laterality Date    ABSCESS DRAINAGE  2000    Renal abscess    CEREBRAL ANGIOGRAM  2020    CRANIOTOMY  2020    with angiogram for aneurysm    HYSTERECTOMY  2017    partial, both ovaries remain    ROTATOR CUFF REPAIR Right 2012    SHOULDER SURGERY          Current Outpatient Medications on File Prior to Visit   Medication Sig Dispense Refill    albuterol sulfate  (90 Base) MCG/ACT inhaler Ventolin HFA 90 mcg/actuation aerosol inhaler      amLODIPine (NORVASC) 5 MG tablet Take 1 tablet by mouth Daily. 30 tablet 3    ferrous sulfate 325 (65 FE) MG tablet Take 1 tablet by mouth 2 (Two) Times a Day With Meals. 60 tablet 3    levothyroxine (SYNTHROID, LEVOTHROID) 25 MCG tablet Take 1 tablet by mouth Daily.      metoprolol tartrate (LOPRESSOR) 50 MG tablet Take 1 tablet by mouth Every Morning.      Xiidra 5 % ophthalmic solution 1 drop.      losartan (COZAAR) 100 MG tablet TAKE 1 TABLET BY MOUTH ONCE DAILY WITH MEALS FOR 30 DAYS (Patient not taking: Reported on 6/3/2025)      ondansetron (ZOFRAN) 4 MG tablet Take 1 tablet by mouth Every 8 (Eight) Hours As Needed. (Patient not taking: Reported on 6/3/2025)       No current facility-administered medications on file prior to visit.        ALLERGIES:    Allergies   Allergen Reactions    Coconut Shortness Of Breath    Egg-Derived Products Diarrhea        Social History     Socioeconomic History    Marital status:     Number of children: 3   Tobacco Use    Smoking status: Never    Smokeless tobacco: Never   Vaping Use    Vaping status: Never Used   Substance and Sexual Activity    Alcohol use: Yes    Drug use: Never        Family History   Problem Relation Age of Onset    Hypertension  "Father     Multiple sclerosis Brother     Diabetes Brother     Colon cancer Paternal Aunt     Heart attack Maternal Grandmother     Heart disease Maternal Grandmother     Pancreatic cancer Maternal Grandfather     Colon cancer Paternal Grandmother     Heart attack Paternal Grandfather     Cancer Paternal Grandfather       Objective     Vitals:    06/03/25 1603   BP: 135/81   Pulse: 63   Resp: 16   Temp: 98.1 °F (36.7 °C)   TempSrc: Oral   SpO2: 100%   Weight: 91.5 kg (201 lb 11.2 oz)   Height: 165.1 cm (65\")   PainSc: 6    PainLoc: Arm  Comment: Arms and legs           6/3/2025     3:59 PM   Current Status   ECOG score 0     Physical Exam  Integument/Skin: Patient reports blotches on arms and legs when exposed to sun.    Physical Exam  Vitals reviewed.   Constitutional:       Appearance: Normal appearance. She is well-developed.   HENT:      Head: Normocephalic and atraumatic.   Eyes:      Conjunctiva/sclera: Conjunctivae normal.   Cardiovascular:      Rate and Rhythm: Normal rate and regular rhythm.      Heart sounds: Normal heart sounds.   Pulmonary:      Effort: Pulmonary effort is normal. No respiratory distress.      Breath sounds: Normal breath sounds.   Abdominal:      General: Bowel sounds are normal. There is no distension.      Palpations: Abdomen is soft.   Lymphadenopathy:      Cervical: No cervical adenopathy.   Skin:     General: Skin is warm.      Findings: No rash.   Neurological:      Mental Status: She is alert. Mental status is at baseline.   Psychiatric:         Thought Content: Thought content normal.       RECENT LABS:  Lab Results   Component Value Date    WBC 13.48 (H) 06/03/2025    HGB 13.1 06/03/2025    HCT 41.2 06/03/2025    MCV 90.7 06/03/2025     (H) 06/03/2025      Lab Results   Component Value Date    GLUCOSE 126 (H) 06/03/2025    BUN 11.9 06/03/2025    CREATININE 0.77 06/03/2025     06/03/2025    K 4.0 06/03/2025     06/03/2025    CALCIUM 9.9 06/03/2025    " PROTEINTOT 7.8 06/03/2025    ALBUMIN 4.5 06/03/2025    ALT 23 06/03/2025    AST 21 06/03/2025    ALKPHOS 82 06/03/2025    BILITOT 0.2 06/03/2025    GLOB 3.3 06/03/2025    AGRATIO 1.4 06/03/2025    BCR 15.5 06/03/2025    ANIONGAP 11.2 06/03/2025    EGFR 92.9 06/03/2025         Lab Results   Component Value Date    IRON 89 06/03/2025    LABIRON 30 06/03/2025    TRANSFERRIN 200 06/03/2025    TIBC 298 06/03/2025    FERRITIN 368.00 (H) 06/03/2025         Assessment & Plan     Assessment & Plan       1.  Multiple Myeloma:    Referred 11/14/2022 for leukocytosis and thrombocytosis by rheumatology, Dr. Ayala.  Diagnosed with Sjogren's by Dr. Ayala.  History of anemia when she had a cerebral aneurysm clip done in 2019.  Patient found to have serum protein electrophoresis that disclosed a monoclonal protein   IgG monoclonal protein in the 2000 category, Bence-Moon protein in the urine, very small amount that is not quantifiable.  Recommend patient proceed with bone marrow testing.  1/24/2023 bone marrow biopsy  Bone marrow biopsy showing 18% plasma cells infiltrating in the bone marrow. Also absence of iron stores. The patient had no evidence of myelofibrosis, lymphoma, leukemia, or myelodysplasia.   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 [place] red stain was negative for amyloid deposition  Recommended treatment with combination of Darzalex once a week, Velcade subcutaneously once a week, 3 weeks out of 4, Revlimid 15 mg a day for 3 weeks out of a month and dexamethasone 20 mg once a week.  Plan to treat the patient for the next 4 months following her monoclonal protein and eventually referred to [facility] for consideration of high-dose chemotherapy and stem cell transplant.  No evidence of lytic lesions, therefore will not use Zometa or Xgeva at this time.  2/3/2023 cycle 1 Darzalex Faspro and Velcade.  02/10/2023 the patient was further reviewed the day  that she comes for her 2nd Darzalex infusion and 2nd Velcade injection. So far the patient has not encountered any side effects of the medicines.  Seen 2/24/2023 due for cycle 1 day 22.  Patient started her Revlimid 15 mg daily for 3 out of 4 weeks last Wednesday.  So far she is tolerating all of her treatment fairly well without any significant side effects other than ongoing fatigue.  3/10/2023 here for cycle 2, day 8 Velcade, Darzalex fast pro, continuing on Revlimid 15 mg daily for 3 weeks on, 1 week off as well as dexamethasone 20 mg weekly.  We will recheck paraprotein studies today.  On 03/17/2023,  She has had resolution of pain in her lower extremities.  Her monoclonal protein has dropped by half in only 1 month of therapy.  She questions referral to BMT, which will be made to Dr. Plasencia in May or June.  3/31/2023: C3D1 Darzalex and Velcade.  Continues Revlimid at home, currently on her second week on Revlimid.  Continues dexamethasone weekly.  Tolerating well.  4/7/2023 seen today for cycle 3, day 8.  She will receive Velcade only today (Darzalex which is every other week).  She will continue her Revlimid 15 mg daily for 21 out of 28 days, as well as dexamethasone 20 mg weekly.  Repeat paraprotein studies pending for today.  4/14/2023 due for cycle 3, day 15 Velcade continuing to complain of leg pain.  We reviewed M spike improved to 0.5,Free light chain kappa 22.2, kappa/lambda ratio 1.8.   4/21/2023 patient returns for cycle 3-day 22 for follow-up of her leg pain.  X-rays performed 4/19/2023 show bilateral knee degeneration however otherwise unremarkable.  Patient did see rheumatology this week and they made multiple medication changes suggestions for her primary care and have asked the patient to make an appointment with them.  We will go ahead and place a referral for cardiology evaluation as recommended by rheumatology.  Case discussed with Dr. Freedman today and we will proceed with bilateral lower  extremity venous Doppler prior to her next visit.   Additional labs obtained including B12, folate, CK all unremarkable.  4/28/2023: Proceed with cycle 4-day 1 Darzalex and Velcade.  Bilateral Doppler negative for DVT.  Consult with cardiology scheduled 5/12/2023.  Continues with swelling/tightness to the bilateral lower extremities.  Saw her PCP who discontinued amlodipine and started losartan.  Scheduled to see cardiology 5/12/2023.  She does feel the swelling/tightness is stable.  5/12/2023, evaluation by Dr. Freedman with recommendations to discontinue Velcade.  Continue Darzalex, weekly dexamethasone 20 mg and Revlimid 15 mg 21 out of 28 days.  She was referred to [facility] bone marrow transplant team  Evaluation by [facility] bone marrow transplant team 5/16/2023.  However, the patient reports they recommended stopping chemotherapy.    Last chemotherapy was on 5/26/2023 for cycle 5-day 1 and chemotherapy was stopped.   09/26/2023 the patient clinically looks stable. She has no peripheral adenopathy, hepatosplenomegaly, bone pain. Obviously, neuropathy. Her white count, hemoglobin and platelets are normal. White count differential is normal. Chemistry profile is normal. Monoclonal protein studies are pending. I advised the patient to remain in observation from this point of view and advised her to return to see us back in 4 months with repeat CBC, CMP, and serum protein immunoelectrophoresis. Find no need for radiological assessment at this time.  Returns 1/16/2024 Continuing in observation in regards to her myeloma.  Continues to report intermittent issues with pain although it is unchanged from her previous visit.  Repeat paraprotein studies from today are pending.  04/16/2024, the patient was further reviewed. She has not had any bone pain. All her pains are muscle related and is variable from time to time. She has no fever or infections. No abnormal bleeding. Her clinical exam is  otherwise unremarkable. Her white count, hemoglobin and platelets are normal. White count differential is normal. Chemistry profile and monoclonal protein studies are pending.   Patient seen back in the office on 6/28/2024 with complaints of worsening pain especially in her lower extremities, bilaterally.  This has been worsening over the past few months.  The pain is excruciating at times.  She is complaining of worsening fatigue and weakness.  She also has pain in her upper extremities and back, but the lower extremity pain is the most bothersome.  Repeat paraprotein studies for today are pending.  I have discussed with Dr. Freedman.  We will also check a CPK, and sed rate.  Discussed patient will likely need to follow-up with her rheumatologist.  On 6/28/2024 Monoclonal serum IgG kappa M spike is 0.5 g/dL. Normal serum IgG 1519, free IgA 181, IgM 82, free kappa chain 22.8, free lambda chain 14.7, and kappa/lambda ratio 1.55.   Evaluated patient on 8/12/2024.  She has normal renal function, and calcium 9.6, hemoglobin 13.1.  Lab study on 8/12/2024 reported serum M spike was 0.6 g/dL with monoclonal IgG kappa. Elevated serum IgG was 1678, and marginally elevated free kappa chain was 27.4 mg/L with a kappa/lambda ratio of 1.80.    Recently on 11/21/2024 kappa level was slightly elevated at 37.9. Serum paraprotein immunofixation and M spike results not available for review.   The patient has a history of MGUS and is under the care of Dr. Plasencia at [facility], with follow-up visits every four months. Recent lab results from 05/15/2025 included a free kappa chain of 21.5 mg/L, lambda chain of 13.7 mg/L, and a kappa/lambda ratio of 1.57. She is advised to continue regular follow-ups with Dr. Plasencia and report any new symptoms.      2.  Nodular goiter: she has a normal TSH and a normal T4. I will let Beau Frye MD, the patient's Primary Physician address this issue eventually with an ultrasound and clinical  examination. The patient has antithyroid antibody positivity and very likely in my opinion she probably has a component of Hashimoto's thyroiditis.   On 03/17/2023 her goiter is no different today than what it was during the original consultation. This will be watched in absence of any other intervention.  On 05/12/2023 we know that she has Hashimoto thyroiditis and her thyroid function has remained stable at this time.  09/26/2023 her goiter is no different today than what it has been before. This will be watched in absence of any other intervention.  On 04/16/2024, her multinodular goiter remains unchanged.  8/12/2024 stable condition.  12/3/2024 no specific complaints.  6/3/2025 disparate complaints.    3.  Sjogren's syndrome with myopathy:  Followed by rheumatology  On 03/17/2023 the patient has minimal symptomatology pertinent to this at this time besides minimal dryness in her eyes and oral mucosas.  On 05/12/2023 continues having pain in the lower extremities. Sounds more neuropathic than anything else. Velcade discontinued at this time.   Velcade discontinued 2 weeks ago with ongoing pain.  She was encouraged to follow-up with rheumatology or neurosurgery to review previous MRIs of the spine which do indicate degenerative disc disease  Continues to follow with rheumatology.  On 04/16/2024, her Sjogren syndrome remains ongoing. Minimal dryness in her eyes. Mostly proper amount of saliva. No other GI manifestations.  Patient will continue follow-up by rheumatology service.      4.  Skin rash-called 3/23/2023 to report skin rash to the bilateral cheeks, neck, spreading towards her shoulders.  She was advised to hold Bactrim.  She was also sent clindamycin gel which she started on Tuesday.  Skin rash is now only present to the bilateral cheeks and much improved.  She will continue clindamycin gel until resolution.  She will continue to hold Bactrim.  Skin rash improved continuing on clindamycin gel.  On  04/16/2024, no rash of any nature.  12/3/2024 no skin rashes.    6/3/2025 patient reports exposure to sunshine will trigger the skin rashes.      5. Hypertension.  8/12/2024 her blood pressure is elevated today 176/105.  Patient already has been taking Lopressor 50 mg daily and losartan 100 mg daily.    I will start amlodipine 5 mg daily.  She was advised to monitor her blood pressure at home.  12/3/2024 /95.  Continue current medications.  6/3/2025 /81.  Continue Lopressor and amlodipine.  Patient is off losartan.    6.  Iron deficiency.  History of iron deficiency.  Patient continues having severe fatigue.  On 8/12/2024, she is not anemic Hb 13.1 MCV 88.1, but has a history of iron deficiency. Her iron levels will be checked today.  8/12/2024 lab study confirmed iron deficiency with ferritin 35.2 ng/mL, free iron 63 and iron saturation 17%.  Increase oral iron to twice a day.  On 12/3/2024 patient presented for reevaluation.  Reports tolerating oral iron twice a day without any side effects. Her iron, B12, and folate levels will be rechecked today. If his iron levels are low, he will be compliant with his twice-daily iron regimen. However, if his iron levels are insufficient, he may be experiencing insufficient absorption and symptoms, necessitating IV iron therapy.     6/3/2025 iron studies showed improved ferritin at 368, free iron at 89, and iron saturation at 30%. The patient received two doses of Feraheme in 12/2024, which has improved her iron levels. She reports slightly improved energy levels but still has fluctuating symptoms. Continuation of the current iron supplementation regimen is advised, and any significant changes in symptoms should be reported.      7.  Mild thrombocytosis.  Lab study today on 8/12/2024 reported platelets 502,000.  No signs of infection.  Suspected this is reactive.  May be related to iron deficiency, and could be also related to her rheumatoid disease.  Continue to  monitor.    5/3/2024 platelets are mildly elevated at 503,000 today, slightly higher than the previous counts of 502,000 in June and 467,000 in the past. This will be monitored.   6/3/2025 laboratory studies today showed persistently elevated platelets at 524,000. Previous lab results from 05/15/2025 reported platelets at 428,000. Monitoring of platelet count will continue, and follow-up with the hematologist is recommended.    8. Leukocytosis.  6/3/2025 laboratory studies today showed slightly worsening leukocytosis with WBC count of 13,480, including neutrophils at 9470. Previous lab results from 05/15/2025 reported WBC count of 13,000. Monitoring of white blood cell count will continue, and follow-up with the hematologist is recommended.        PLAN:   Obtain new studies BCR-ABL by FISH, if negative test for JAK2, MPL and CALR mutatiions.   If all negative, then cbc, iron q6 months and see me in 12 months.   Continue oral iron twice a day.    She will continue management of diabetes with Norvasc, and Lopressor, followed by PCP for management of hypertension.  I will see patient in 6 months for reevaluation we will check CBC CMP ferritin and iron profile.  Call/ return sooner should the patient develop any new concerns or problems.    Patient has worsening leukocytosis and thrombocytosis.     I discussed with the patient about laboratory results and further management plan.  Patient voiced understanding and agreeable.      Aung Riggins MD PhD  06/03/2025        CC:  Beau Frye MD Boone, John Bradford, MD

## 2025-06-03 NOTE — TELEPHONE ENCOUNTER
No PA required for BCR/ABL Fish per [facility] provider portal. Decision ID # [id number]. Ok'd lab to send to IO.

## 2025-06-07 LAB — TARGETGENE RESULT: NORMAL

## 2025-06-16 DIAGNOSIS — D47.2 SMOLDERING MULTIPLE MYELOMA: ICD-10-CM

## 2025-06-16 DIAGNOSIS — D75.839 THROMBOCYTOSIS: ICD-10-CM

## 2025-06-16 DIAGNOSIS — D72.829 LEUKOCYTOSIS, UNSPECIFIED TYPE: Primary | ICD-10-CM

## 2025-06-24 ENCOUNTER — LAB (OUTPATIENT)
Dept: LAB | Facility: HOSPITAL | Age: 55
End: 2025-06-24
Payer: COMMERCIAL

## 2025-06-24 DIAGNOSIS — D50.9 IRON DEFICIENCY ANEMIA, UNSPECIFIED IRON DEFICIENCY ANEMIA TYPE: ICD-10-CM

## 2025-06-24 DIAGNOSIS — D47.2 SMOLDERING MULTIPLE MYELOMA: ICD-10-CM

## 2025-06-24 DIAGNOSIS — D75.839 THROMBOCYTOSIS: ICD-10-CM

## 2025-06-24 DIAGNOSIS — D72.829 LEUKOCYTOSIS, UNSPECIFIED TYPE: Primary | ICD-10-CM

## 2025-06-24 LAB
BASOPHILS # BLD AUTO: 0.05 10*3/MM3 (ref 0–0.2)
BASOPHILS NFR BLD AUTO: 0.5 % (ref 0–1.5)
DEPRECATED RDW RBC AUTO: 46 FL (ref 37–54)
EOSINOPHIL # BLD AUTO: 0.19 10*3/MM3 (ref 0–0.4)
EOSINOPHIL NFR BLD AUTO: 1.7 % (ref 0.3–6.2)
ERYTHROCYTE [DISTWIDTH] IN BLOOD BY AUTOMATED COUNT: 13.6 % (ref 12.3–15.4)
FERRITIN SERPL-MCNC: 374 NG/ML (ref 13–150)
HCT VFR BLD AUTO: 41.4 % (ref 34–46.6)
HGB BLD-MCNC: 13.3 G/DL (ref 12–15.9)
IMM GRANULOCYTES # BLD AUTO: 0.09 10*3/MM3 (ref 0–0.05)
IMM GRANULOCYTES NFR BLD AUTO: 0.8 % (ref 0–0.5)
IRON 24H UR-MRATE: 104 MCG/DL (ref 37–145)
IRON SATN MFR SERPL: 36 % (ref 20–50)
LYMPHOCYTES # BLD AUTO: 2.82 10*3/MM3 (ref 0.7–3.1)
LYMPHOCYTES NFR BLD AUTO: 25.8 % (ref 19.6–45.3)
MCH RBC QN AUTO: 29.2 PG (ref 26.6–33)
MCHC RBC AUTO-ENTMCNC: 32.1 G/DL (ref 31.5–35.7)
MCV RBC AUTO: 91 FL (ref 79–97)
MONOCYTES # BLD AUTO: 0.7 10*3/MM3 (ref 0.1–0.9)
MONOCYTES NFR BLD AUTO: 6.4 % (ref 5–12)
NEUTROPHILS NFR BLD AUTO: 64.8 % (ref 42.7–76)
NEUTROPHILS NFR BLD AUTO: 7.06 10*3/MM3 (ref 1.7–7)
NRBC BLD AUTO-RTO: 0 /100 WBC (ref 0–0.2)
PLATELET # BLD AUTO: 478 10*3/MM3 (ref 140–450)
PMV BLD AUTO: 9.5 FL (ref 6–12)
RBC # BLD AUTO: 4.55 10*6/MM3 (ref 3.77–5.28)
TIBC SERPL-MCNC: 288 MCG/DL (ref 298–536)
TRANSFERRIN SERPL-MCNC: 193 MG/DL (ref 200–360)
WBC NRBC COR # BLD AUTO: 10.91 10*3/MM3 (ref 3.4–10.8)

## 2025-06-24 PROCEDURE — 82728 ASSAY OF FERRITIN: CPT

## 2025-06-24 PROCEDURE — 36415 COLL VENOUS BLD VENIPUNCTURE: CPT

## 2025-06-24 PROCEDURE — 84466 ASSAY OF TRANSFERRIN: CPT

## 2025-06-24 PROCEDURE — 83540 ASSAY OF IRON: CPT

## 2025-06-24 PROCEDURE — 85025 COMPLETE CBC W/AUTO DIFF WBC: CPT

## 2025-06-26 LAB — JAK2 V617F RESULT: NORMAL

## 2025-06-27 LAB — CALR MUTATION ANALYSIS RESULT: NORMAL

## 2025-07-02 LAB
JAK2 EXONS 12-15 BY PCR RESULT: NORMAL
MPL MUTATION RESULT: NORMAL

## 2025-07-08 LAB — JAK2 V617F RESULT: NORMAL
